# Patient Record
Sex: MALE | Race: WHITE | HISPANIC OR LATINO | Employment: OTHER | URBAN - METROPOLITAN AREA
[De-identification: names, ages, dates, MRNs, and addresses within clinical notes are randomized per-mention and may not be internally consistent; named-entity substitution may affect disease eponyms.]

---

## 2023-03-11 DIAGNOSIS — E03.9 HYPOTHYROIDISM, UNSPECIFIED: ICD-10-CM

## 2023-03-19 RX ORDER — LEVOTHYROXINE SODIUM 112 UG/1
TABLET ORAL
Qty: 102 TABLET | Refills: 2 | Status: SHIPPED | OUTPATIENT
Start: 2023-03-19 | End: 2023-07-13 | Stop reason: SDUPTHER

## 2023-07-04 DIAGNOSIS — I10 ESSENTIAL (PRIMARY) HYPERTENSION: ICD-10-CM

## 2023-07-06 RX ORDER — AMLODIPINE BESYLATE 5 MG/1
TABLET ORAL
Qty: 90 TABLET | Refills: 0 | Status: SHIPPED | OUTPATIENT
Start: 2023-07-06 | End: 2023-07-13 | Stop reason: SDUPTHER

## 2023-07-13 ENCOUNTER — OFFICE VISIT (OUTPATIENT)
Dept: PRIMARY CARE | Facility: CLINIC | Age: 73
End: 2023-07-13
Payer: MEDICARE

## 2023-07-13 VITALS
WEIGHT: 204 LBS | OXYGEN SATURATION: 100 % | HEART RATE: 74 BPM | BODY MASS INDEX: 29.2 KG/M2 | HEIGHT: 70 IN | SYSTOLIC BLOOD PRESSURE: 116 MMHG | DIASTOLIC BLOOD PRESSURE: 72 MMHG

## 2023-07-13 DIAGNOSIS — I10 PRIMARY HYPERTENSION: ICD-10-CM

## 2023-07-13 DIAGNOSIS — E03.9 ACQUIRED HYPOTHYROIDISM: ICD-10-CM

## 2023-07-13 DIAGNOSIS — I10 ESSENTIAL (PRIMARY) HYPERTENSION: ICD-10-CM

## 2023-07-13 DIAGNOSIS — Z00.00 MEDICARE ANNUAL WELLNESS VISIT, SUBSEQUENT: Primary | Chronic | ICD-10-CM

## 2023-07-13 DIAGNOSIS — E03.9 HYPOTHYROIDISM, UNSPECIFIED: ICD-10-CM

## 2023-07-13 DIAGNOSIS — E78.5 HYPERLIPIDEMIA, UNSPECIFIED HYPERLIPIDEMIA TYPE: ICD-10-CM

## 2023-07-13 DIAGNOSIS — Z12.11 SCREENING FOR COLON CANCER: ICD-10-CM

## 2023-07-13 PROBLEM — E78.49 OTHER HYPERLIPIDEMIA: Status: ACTIVE | Noted: 2023-07-13

## 2023-07-13 PROCEDURE — 1125F AMNT PAIN NOTED PAIN PRSNT: CPT | Performed by: INTERNAL MEDICINE

## 2023-07-13 PROCEDURE — G0439 PPPS, SUBSEQ VISIT: HCPCS | Performed by: INTERNAL MEDICINE

## 2023-07-13 PROCEDURE — 1036F TOBACCO NON-USER: CPT | Performed by: INTERNAL MEDICINE

## 2023-07-13 PROCEDURE — 1160F RVW MEDS BY RX/DR IN RCRD: CPT | Performed by: INTERNAL MEDICINE

## 2023-07-13 PROCEDURE — 3074F SYST BP LT 130 MM HG: CPT | Performed by: INTERNAL MEDICINE

## 2023-07-13 PROCEDURE — 3078F DIAST BP <80 MM HG: CPT | Performed by: INTERNAL MEDICINE

## 2023-07-13 PROCEDURE — 1170F FXNL STATUS ASSESSED: CPT | Performed by: INTERNAL MEDICINE

## 2023-07-13 PROCEDURE — 1159F MED LIST DOCD IN RCRD: CPT | Performed by: INTERNAL MEDICINE

## 2023-07-13 RX ORDER — TRIAMCINOLONE ACETONIDE 55 UG/1
SPRAY, METERED NASAL
COMMUNITY

## 2023-07-13 RX ORDER — MAG HYDROX/ALUMINUM HYD/SIMETH 200-200-20
SUSPENSION, ORAL (FINAL DOSE FORM) ORAL AS NEEDED
COMMUNITY

## 2023-07-13 RX ORDER — CETIRIZINE HYDROCHLORIDE 10 MG/1
10 TABLET ORAL AS NEEDED
COMMUNITY
Start: 2015-03-03

## 2023-07-13 RX ORDER — LEVOTHYROXINE SODIUM 112 UG/1
112 TABLET ORAL
Qty: 102 TABLET | Refills: 2 | Status: SHIPPED | OUTPATIENT
Start: 2023-07-13 | End: 2024-01-19

## 2023-07-13 RX ORDER — AMLODIPINE BESYLATE 5 MG/1
5 TABLET ORAL DAILY
Qty: 90 TABLET | Refills: 2 | Status: SHIPPED | OUTPATIENT
Start: 2023-07-13 | End: 2023-10-17 | Stop reason: SDUPTHER

## 2023-07-13 RX ORDER — LOVASTATIN 40 MG/1
40 TABLET ORAL NIGHTLY
Qty: 90 TABLET | Refills: 2 | Status: SHIPPED | OUTPATIENT
Start: 2023-07-13 | End: 2024-04-04

## 2023-07-13 RX ORDER — LOVASTATIN 40 MG/1
40 TABLET ORAL NIGHTLY
COMMUNITY
End: 2023-07-13 | Stop reason: SDUPTHER

## 2023-07-13 RX ORDER — ASPIRIN 81 MG/1
1 TABLET ORAL DAILY
COMMUNITY
Start: 2021-10-18

## 2023-07-13 ASSESSMENT — ENCOUNTER SYMPTOMS
BLURRED VISION: 0
ORTHOPNEA: 0
HYPERTENSION: 1
NECK PAIN: 0
HEADACHES: 0
PALPITATIONS: 0

## 2023-07-13 ASSESSMENT — PATIENT HEALTH QUESTIONNAIRE - PHQ9
SUM OF ALL RESPONSES TO PHQ9 QUESTIONS 1 AND 2: 0
2. FEELING DOWN, DEPRESSED OR HOPELESS: NOT AT ALL
1. LITTLE INTEREST OR PLEASURE IN DOING THINGS: NOT AT ALL

## 2023-07-13 ASSESSMENT — ACTIVITIES OF DAILY LIVING (ADL)
BATHING: INDEPENDENT
GROCERY_SHOPPING: INDEPENDENT
DOING_HOUSEWORK: INDEPENDENT
DRESSING: INDEPENDENT
MANAGING_FINANCES: INDEPENDENT
TAKING_MEDICATION: INDEPENDENT

## 2023-07-13 NOTE — PROGRESS NOTES
Subjective   Reason for Visit: Gaurav Fischer is an 73 y.o. male here for a Medicare Wellness visit.     Past Medical, Surgical, and Family History reviewed and updated in chart.    Reviewed all medications by prescribing practitioner or clinical pharmacist (such as prescriptions, OTCs, herbal therapies and supplements) and documented in the medical record.    Rash    Hypertension  This is a chronic problem. The current episode started more than 1 year ago. The problem has been resolved since onset. The problem is controlled. Pertinent negatives include no anxiety, blurred vision, chest pain, headaches, neck pain, orthopnea or palpitations. There are no associated agents to hypertension. Risk factors for coronary artery disease include male gender. Past treatments include calcium channel blockers. The current treatment provides significant improvement. There are no compliance problems.  There is no history of angina, kidney disease, CAD/MI, CVA, heart failure, left ventricular hypertrophy, PVD or retinopathy. There is no history of chronic renal disease or sleep apnea.       Patient Self Assessment of Health Status  Patient Self Assessment: Fair    Nutrition and Exercise  Current Diet: Unhealthy Diet  Adequate Fluid Intake: Yes  Caffeine: No  Exercise Frequency: No Exercise    Functional Ability/Level of Safety  Cognitive Impairment Observed: No cognitive impairment observed  Cognitive Impairment Reported: No cognitive impairment reported by patient or family    Home Safety Risk Factors: None    Patient Care Team:  Renetta Barnett MD as PCP - General  Renetta Barnett MD as PCP - [employer] PCP     Review of Systems   Eyes:  Negative for blurred vision.   Cardiovascular:  Negative for chest pain, palpitations and orthopnea.   Musculoskeletal:  Negative for neck pain.   Skin:  Positive for rash.   Neurological:  Negative for headaches.       Objective   Vitals:  /72   Pulse 74   Ht 1.765  "m (5' 9.5\")   Wt 92.5 kg (204 lb)   SpO2 100%   BMI 29.69 kg/m²       Physical Exam    Lab Results   Component Value Date    WBC 5.5 07/13/2020    HGB 13.9 07/13/2020    HCT 42.6 07/13/2020     07/13/2020    CHOL 122 02/06/2023    TRIG 64 02/06/2023    HDL 49.1 02/06/2023    LDLDIRECT 66 02/06/2023    ALT 20 06/14/2022    AST 30 06/14/2022     02/06/2023    K 4.2 02/06/2023     02/06/2023    CREATININE 1.08 02/06/2023    BUN 20 02/06/2023    CO2 31 02/06/2023    TSH 2.42 10/13/2022    GLUF 96 05/19/2022    HGBA1C 5.6 05/19/2022       Assessment/Plan   Problem List Items Addressed This Visit       Primary hypertension    Relevant Medications    amLODIPine (Norvasc) 5 mg tablet    Acquired hypothyroidism    Relevant Medications    levothyroxine (Synthroid, Levoxyl) 112 mcg tablet    Other Relevant Orders    Tsh With Reflex To Free T4 If Abnormal     Other Visit Diagnoses       Medicare annual wellness visit, subsequent  (Chronic)   -  Primary    Relevant Orders    Comprehensive metabolic panel    Lipid panel    Prostate Spec.Ag,Screen    Essential (primary) hypertension        Relevant Medications    amLODIPine (Norvasc) 5 mg tablet    Hypothyroidism, unspecified        Relevant Medications    levothyroxine (Synthroid, Levoxyl) 112 mcg tablet    Other Relevant Orders    Tsh With Reflex To Free T4 If Abnormal    Hyperlipidemia, unspecified hyperlipidemia type        Relevant Medications    lovastatin (Mevacor) 40 mg tablet            A/P        CMP,LIPID,PSA, COLOGUARD   LEVOTHYROXIN 112MCG 1 PILL EVERY DAY AND 1.5 PILLS ONLY ON MONDAY FILLED  AMLODIPINE 5MG 1 PILL EVERY DAY FILLED  LOVASTATIN 40MG 1 PILL EVERY DAY FILLED  HE WILL LET ME KNOW WHEN HE HAD LAST COLONOSCOPY            Advance Care Planning Note     Discussion Date: 07/13/23   Discussion Participants: patient    The patient wishes to discuss Advance Care Planning today and the following is a brief summary of our discussion. "     Patient has capacity to make their own medical decisions: Yes  Health Care Agent/Surrogate Decision Maker documented in chart: No    Documents on file and valid:  Advance Directive/Living Will: No   Health Care Power of : No  Other: NA    Communication of Medical Status/Prognosis:   NA     Communication of Treatment Goals/Options:       DISCUSSED WITH THE PT END OF LIFE CHOICES , PT IS PRESENTLY FULL CODE , HE WILL GET POA FOR HEALTH CARE /& LIVING WILL AND WILL BRING IT BACK ON NEXT OFFICE VISIT SO THIS CAN BE SCANNED INTO THE CHART FOR THE PURPOSE OF DOCUMENTATION      Treatment Decisions  DISCUSSED    Time Statement: Total face to face time spent on advance care planning was 5 minutes with 5 minutes spent in counseling, including the explanation.    Renetta Barnett MD  7/13/2023 2:17 PM

## 2023-07-14 ENCOUNTER — TELEPHONE (OUTPATIENT)
Dept: PRIMARY CARE | Facility: CLINIC | Age: 73
End: 2023-07-14

## 2023-07-14 LAB
ALANINE AMINOTRANSFERASE (SGPT) (U/L) IN SER/PLAS: 19 U/L (ref 10–52)
ALBUMIN (G/DL) IN SER/PLAS: 4.9 G/DL (ref 3.4–5)
ALKALINE PHOSPHATASE (U/L) IN SER/PLAS: 61 U/L (ref 33–136)
ANION GAP IN SER/PLAS: 14 MMOL/L (ref 10–20)
ASPARTATE AMINOTRANSFERASE (SGOT) (U/L) IN SER/PLAS: 24 U/L (ref 9–39)
BILIRUBIN TOTAL (MG/DL) IN SER/PLAS: 0.6 MG/DL (ref 0–1.2)
CALCIUM (MG/DL) IN SER/PLAS: 9.5 MG/DL (ref 8.6–10.6)
CARBON DIOXIDE, TOTAL (MMOL/L) IN SER/PLAS: 28 MMOL/L (ref 21–32)
CHLORIDE (MMOL/L) IN SER/PLAS: 104 MMOL/L (ref 98–107)
CHOLESTEROL (MG/DL) IN SER/PLAS: 132 MG/DL (ref 0–199)
CHOLESTEROL IN HDL (MG/DL) IN SER/PLAS: 48.5 MG/DL
CHOLESTEROL/HDL RATIO: 2.7
CREATININE (MG/DL) IN SER/PLAS: 1.11 MG/DL (ref 0.5–1.3)
GFR MALE: 71 ML/MIN/1.73M2
GLUCOSE (MG/DL) IN SER/PLAS: 83 MG/DL (ref 74–99)
LDL: 69 MG/DL (ref 0–99)
POTASSIUM (MMOL/L) IN SER/PLAS: 4.9 MMOL/L (ref 3.5–5.3)
PROSTATE SPECIFIC ANTIGEN,SCREEN: 1.54 NG/ML (ref 0–4)
PROTEIN TOTAL: 7.1 G/DL (ref 6.4–8.2)
SODIUM (MMOL/L) IN SER/PLAS: 141 MMOL/L (ref 136–145)
THYROTROPIN (MIU/L) IN SER/PLAS BY DETECTION LIMIT <= 0.05 MIU/L: 0.83 MIU/L (ref 0.44–3.98)
TRIGLYCERIDE (MG/DL) IN SER/PLAS: 71 MG/DL (ref 0–149)
UREA NITROGEN (MG/DL) IN SER/PLAS: 18 MG/DL (ref 6–23)
VLDL: 14 MG/DL (ref 0–40)

## 2023-07-14 PROCEDURE — 80053 COMPREHEN METABOLIC PANEL: CPT

## 2023-07-14 PROCEDURE — 80061 LIPID PANEL: CPT

## 2023-07-14 PROCEDURE — 84443 ASSAY THYROID STIM HORMONE: CPT

## 2023-07-14 PROCEDURE — 84153 ASSAY OF PSA TOTAL: CPT

## 2023-07-14 NOTE — TELEPHONE ENCOUNTER
Pt. States dropped off colonoscopy report. Asking if he still needs to do the Cologuard test. Report place on your desk.

## 2023-08-02 LAB — NONINV COLON CA DNA+OCC BLD SCRN STL QL: NEGATIVE

## 2023-08-23 PROBLEM — R00.2 PALPITATIONS: Status: ACTIVE | Noted: 2023-08-23

## 2023-08-23 PROBLEM — I73.9 CLAUDICATION, INTERMITTENT (CMS-HCC): Status: ACTIVE | Noted: 2023-08-23

## 2023-08-23 PROBLEM — H57.13 EYE PAIN, BILATERAL: Status: ACTIVE | Noted: 2023-08-23

## 2023-08-23 PROBLEM — C73 MEDULLARY THYROID CARCINOMA (MULTI): Status: ACTIVE | Noted: 2023-08-23

## 2023-08-23 PROBLEM — R91.1 LUNG NODULE: Status: ACTIVE | Noted: 2023-08-23

## 2023-08-23 PROBLEM — E66.3 OVERWEIGHT (BMI 25.0-29.9): Status: ACTIVE | Noted: 2023-08-23

## 2023-08-23 PROBLEM — H93.11 TINNITUS OF RIGHT EAR: Status: ACTIVE | Noted: 2023-08-23

## 2023-08-23 PROBLEM — K21.9 ESOPHAGEAL REFLUX: Status: ACTIVE | Noted: 2023-08-23

## 2023-08-23 PROBLEM — R35.1 NOCTURIA: Status: ACTIVE | Noted: 2023-08-23

## 2023-08-23 PROBLEM — G89.29 CHRONIC LOW BACK PAIN: Status: ACTIVE | Noted: 2023-08-23

## 2023-08-23 PROBLEM — R20.9 SENSATION OF COLD IN FINGER: Status: ACTIVE | Noted: 2023-08-23

## 2023-08-23 PROBLEM — Z98.890 H/O TOTAL THYROIDECTOMY: Status: ACTIVE | Noted: 2023-08-23

## 2023-08-23 PROBLEM — R35.0 FREQUENCY OF URINATION: Status: ACTIVE | Noted: 2023-08-23

## 2023-08-23 PROBLEM — N52.9 ERECTILE DYSFUNCTION: Status: ACTIVE | Noted: 2023-08-23

## 2023-08-23 PROBLEM — R55 FAINTING SPELL: Status: ACTIVE | Noted: 2023-08-23

## 2023-08-23 PROBLEM — M70.61 TROCHANTERIC BURSITIS OF RIGHT HIP: Status: ACTIVE | Noted: 2023-08-23

## 2023-08-23 PROBLEM — N13.8 BPH WITH OBSTRUCTION/LOWER URINARY TRACT SYMPTOMS: Status: ACTIVE | Noted: 2023-08-23

## 2023-08-23 PROBLEM — N28.1 KIDNEY CYSTS: Status: ACTIVE | Noted: 2023-08-23

## 2023-08-23 PROBLEM — E89.0 H/O TOTAL THYROIDECTOMY: Status: ACTIVE | Noted: 2023-08-23

## 2023-08-23 PROBLEM — K43.2 INCISIONAL HERNIA, WITHOUT OBSTRUCTION OR GANGRENE: Status: ACTIVE | Noted: 2023-08-23

## 2023-08-23 PROBLEM — Z98.890 HISTORY OF ENDOSCOPIC SINUS SURGERY: Status: ACTIVE | Noted: 2018-07-11

## 2023-08-23 PROBLEM — R42 INTERMITTENT LIGHTHEADEDNESS: Status: ACTIVE | Noted: 2023-08-23

## 2023-08-23 PROBLEM — E88.01 ALPHA-1-ANTITRYPSIN DEFICIENCY (MULTI): Status: ACTIVE | Noted: 2019-10-03

## 2023-08-23 PROBLEM — R30.0 DYSURIA: Status: ACTIVE | Noted: 2023-08-23

## 2023-08-23 PROBLEM — Z90.89 H/O TOTAL THYROIDECTOMY: Status: ACTIVE | Noted: 2023-08-23

## 2023-08-23 PROBLEM — M25.551 RIGHT HIP PAIN: Status: ACTIVE | Noted: 2023-08-23

## 2023-08-23 PROBLEM — D53.9 ANEMIA, DEFICIENCY: Status: ACTIVE | Noted: 2023-08-23

## 2023-08-23 PROBLEM — M54.50 CHRONIC LOW BACK PAIN: Status: ACTIVE | Noted: 2023-08-23

## 2023-08-23 PROBLEM — M53.3 PAIN IN THE COCCYX: Status: ACTIVE | Noted: 2023-08-23

## 2023-08-23 PROBLEM — K80.20 GALLSTONES: Status: ACTIVE | Noted: 2023-08-23

## 2023-08-23 PROBLEM — N40.1 BPH WITH OBSTRUCTION/LOWER URINARY TRACT SYMPTOMS: Status: ACTIVE | Noted: 2023-08-23

## 2023-08-23 PROBLEM — R06.09 DYSPNEA ON EXERTION: Status: ACTIVE | Noted: 2023-08-23

## 2023-08-23 PROBLEM — R35.81 NOCTURNAL POLYURIA: Status: ACTIVE | Noted: 2023-08-23

## 2023-08-23 RX ORDER — FAMOTIDINE 20 MG/1
20 TABLET, FILM COATED ORAL DAILY
COMMUNITY

## 2023-08-23 RX ORDER — OMEPRAZOLE 20 MG/1
20 TABLET, DELAYED RELEASE ORAL DAILY
COMMUNITY
Start: 2008-07-22 | End: 2023-10-30 | Stop reason: ALTCHOICE

## 2023-08-23 RX ORDER — TAMSULOSIN HYDROCHLORIDE 0.4 MG/1
0.4 CAPSULE ORAL DAILY
COMMUNITY

## 2023-08-23 RX ORDER — AZELASTINE 1 MG/ML
SPRAY, METERED NASAL
COMMUNITY
End: 2023-10-30 | Stop reason: ALTCHOICE

## 2023-08-23 RX ORDER — TADALAFIL 5 MG/1
5 TABLET ORAL DAILY
COMMUNITY

## 2023-10-17 DIAGNOSIS — I10 ESSENTIAL (PRIMARY) HYPERTENSION: ICD-10-CM

## 2023-10-17 DIAGNOSIS — N39.0 URINARY TRACT INFECTION WITHOUT HEMATURIA, SITE UNSPECIFIED: Primary | ICD-10-CM

## 2023-10-17 DIAGNOSIS — I10 PRIMARY HYPERTENSION: ICD-10-CM

## 2023-10-17 RX ORDER — AMLODIPINE BESYLATE 5 MG/1
5 TABLET ORAL DAILY
Qty: 90 TABLET | Refills: 2 | Status: SHIPPED | OUTPATIENT
Start: 2023-10-17

## 2023-10-18 ENCOUNTER — CLINICAL SUPPORT (OUTPATIENT)
Dept: PRIMARY CARE | Facility: CLINIC | Age: 73
End: 2023-10-18
Payer: MEDICARE

## 2023-10-18 DIAGNOSIS — N39.0 URINARY TRACT INFECTION WITHOUT HEMATURIA, SITE UNSPECIFIED: ICD-10-CM

## 2023-10-18 LAB
APPEARANCE UR: CLEAR
BILIRUB UR STRIP.AUTO-MCNC: NEGATIVE MG/DL
COLOR UR: YELLOW
GLUCOSE UR STRIP.AUTO-MCNC: NEGATIVE MG/DL
KETONES UR STRIP.AUTO-MCNC: NEGATIVE MG/DL
LEUKOCYTE ESTERASE UR QL STRIP.AUTO: NEGATIVE
NITRITE UR QL STRIP.AUTO: NEGATIVE
PH UR STRIP.AUTO: 6 [PH]
PROT UR STRIP.AUTO-MCNC: NEGATIVE MG/DL
RBC # UR STRIP.AUTO: NEGATIVE /UL
SP GR UR STRIP.AUTO: 1.01
UROBILINOGEN UR STRIP.AUTO-MCNC: <2 MG/DL

## 2023-10-18 PROCEDURE — 87086 URINE CULTURE/COLONY COUNT: CPT

## 2023-10-18 PROCEDURE — 81003 URINALYSIS AUTO W/O SCOPE: CPT

## 2023-10-19 LAB — BACTERIA UR CULT: NO GROWTH

## 2023-10-20 ENCOUNTER — TELEPHONE (OUTPATIENT)
Dept: PRIMARY CARE | Facility: CLINIC | Age: 73
End: 2023-10-20
Payer: MEDICARE

## 2023-10-30 ENCOUNTER — OFFICE VISIT (OUTPATIENT)
Dept: CARDIOLOGY | Facility: CLINIC | Age: 73
End: 2023-10-30
Payer: MEDICARE

## 2023-10-30 VITALS
OXYGEN SATURATION: 98 % | HEIGHT: 70 IN | BODY MASS INDEX: 29.49 KG/M2 | WEIGHT: 206 LBS | SYSTOLIC BLOOD PRESSURE: 121 MMHG | DIASTOLIC BLOOD PRESSURE: 76 MMHG | HEART RATE: 61 BPM

## 2023-10-30 DIAGNOSIS — R93.1 ELEVATED CORONARY ARTERY CALCIUM SCORE: ICD-10-CM

## 2023-10-30 DIAGNOSIS — I10 PRIMARY HYPERTENSION: ICD-10-CM

## 2023-10-30 DIAGNOSIS — R06.09 DYSPNEA ON EXERTION: ICD-10-CM

## 2023-10-30 DIAGNOSIS — R00.2 PALPITATIONS: Primary | ICD-10-CM

## 2023-10-30 PROCEDURE — 99215 OFFICE O/P EST HI 40 MIN: CPT | Performed by: INTERNAL MEDICINE

## 2023-10-30 PROCEDURE — 1159F MED LIST DOCD IN RCRD: CPT | Performed by: INTERNAL MEDICINE

## 2023-10-30 PROCEDURE — 99215 OFFICE O/P EST HI 40 MIN: CPT | Mod: 25 | Performed by: INTERNAL MEDICINE

## 2023-10-30 PROCEDURE — 1036F TOBACCO NON-USER: CPT | Performed by: INTERNAL MEDICINE

## 2023-10-30 PROCEDURE — 3078F DIAST BP <80 MM HG: CPT | Performed by: INTERNAL MEDICINE

## 2023-10-30 PROCEDURE — 93005 ELECTROCARDIOGRAM TRACING: CPT | Performed by: INTERNAL MEDICINE

## 2023-10-30 PROCEDURE — 3074F SYST BP LT 130 MM HG: CPT | Performed by: INTERNAL MEDICINE

## 2023-10-30 PROCEDURE — 1160F RVW MEDS BY RX/DR IN RCRD: CPT | Performed by: INTERNAL MEDICINE

## 2023-10-30 PROCEDURE — 93010 ELECTROCARDIOGRAM REPORT: CPT | Performed by: INTERNAL MEDICINE

## 2023-10-30 PROCEDURE — 1126F AMNT PAIN NOTED NONE PRSNT: CPT | Performed by: INTERNAL MEDICINE

## 2023-10-30 ASSESSMENT — ENCOUNTER SYMPTOMS
BLOATING: 0
WHEEZING: 0
LIGHT-HEADEDNESS: 0
DECREASED LIBIDO: 0
PALPITATIONS: 0
NEAR-SYNCOPE: 0
DYSPNEA ON EXERTION: 0
HEADACHES: 0
ORTHOPNEA: 0
CLAUDICATION: 0
LOSS OF BALANCE: 0
BOWEL INCONTINENCE: 0
DIAPHORESIS: 0
CHILLS: 0
SPUTUM PRODUCTION: 0
DIZZINESS: 0
FEVER: 0
PND: 0
ENDOCRINE NEGATIVE: 1
BLURRED VISION: 0
EYE PAIN: 0
HEMOPTYSIS: 0
IRREGULAR HEARTBEAT: 1
DOUBLE VISION: 0
PSYCHIATRIC NEGATIVE: 1
SHORTNESS OF BREATH: 0
CHANGE IN BOWEL HABIT: 0
ABDOMINAL PAIN: 0
FALLS: 0
ANOREXIA: 0
EYE DISCHARGE: 0
BACK PAIN: 0
DECREASED APPETITE: 0
JOINT SWELLING: 0
COUGH: 0

## 2023-10-30 ASSESSMENT — PAIN SCALES - GENERAL: PAINLEVEL: 0-NO PAIN

## 2023-10-30 NOTE — PATIENT INSTRUCTIONS
"It was a pleasure seeing you today. I would like to see you back in clinic in  4  months. You can call my office if questions arise between now and our next visit.     Today, we talked about your overall health   -- I am glad to hear that you went hiking in the mountains and had no difficulty    -- Please resume dailyt aspirin 81 mg     -- Please continue your current medications     -- Let us know if you feel more heart palpitations, if they are longer in duration as we will need to look at an event monitor .     Below are some Heart Health Tips that we provide to all of our patients. I hope you fing them useful.     - If you are having problems with medications, consider looking at the following websites.   --  \"[facility]\"   --  \"[facility]\"      - We are happy to supply written prescriptions if needed to allow you to obtain your medications from different pharmacies. Additionally, if you are having issues with mail order delivery, please let us know. We can send a limited supply of your medications to your local pharmacy.     -  We recommend you follow a heart healthy diet. Watch food labels and try not to eat more than 2,500 mg of sodium per day. Avoid foods high in salt like processed meats (lunch meats, mullins, and sausage), processed foods (boxed dinners, canned soups), fried and fast foods. Monitor serving sizes and if the sodium per serving size is more than 200 mg, avoid those foods. If the sodium per serving size is between 100-200 mg, you can use those in limited quantities. Try to choose foods where the amount of sodium per serving size is less than 100 mg. Try to eat a diet rich in fruits and vegetables, whole grains, low fat dairy products, skinless poultry and fish, nuts, beans, non-tropical vegetable oils. Limit saturated fat, trans fat, sodium, red meats, and sugar-sweetened beverages.   Limit alcohol     -The combination of a reduced-calorie diet and increased physical activity " is recommended. Adults should aim to get at least 150 minutes of moderate physical activity per week (30 minutes of moderate physical activities at least 5 days per week). Examples of moderate physical activities include brisk walking, swimming, aerobic dancing, heavy gardening, jumping rope, bicycling 10 MPH or faster, tennis, hiking uphill or with a heavy backpack. Please let us know if you would like to learn more about your nutrition and calories and additional options including weight loss programs to help you reach your goal.     -If you smoke, stop smoking. If you stop smoking you can help get rid of a major source of stress to your heart. Smoking makes your heart rate and blood pressure go up and increases your risk or developing cardiovascular diseases and worsen symptoms associated with heart failure.     -Obtain a BP monitor and monitor your BP daily. Check it around the same time each day; at least 1 hour after taking your medications. Record your BP in a log and bring your log with you to your doctors appointment.     -F/u with your PCP as recommended.

## 2023-10-30 NOTE — PROGRESS NOTES
"Chief Complaint:   Follow-up     History Of Present Illness:    Gaurav Fischer is a 73 y.o. male presenting with a pertinent medical history notable for essential hypertension, BPH, alpha 1 antitrypsin, dyslipidemia, dyspnea on exertion, gastroesophageal reflux disease, hypersomnia with obstructive sleep apnea, history of medullary thyroid carcinoma who presents to cardiology for follow up of of heart palpitations and presyncope and dyslipidemia with elevated CAC     His history is rather convoluted. Recall   --States that he will have intermittent periods of sensations of a \"pounding heart\" that usually occur in the evening. He notes that these usually occur after day of heavy exertion, such as shoveling snow, moving 50 pound bags of lawn care materials, or watching baseball games in the sun. He reports that he has no chest discomfort, palpitations, heaviness, dyspnea exertion during these episodes, usually occurs right when he is about to go to bed later that night.   He also reports intermittent periods of presyncope, lightheadedness, dizziness that occur while he is in [employer]. States that his symptoms will somewhat chanel when he is able to sit back down. Further, he is very concerned about \"circulation problems\" in his hands and arms. He reports intermittent numbness, tingling as well as change in temperature of his hands. Additionally, he is concerned about \"things building up in my heart\" and he has been doing \"a lot of research about coronary calcium and wonders whether he should have this done\"      Since he was last seen, he reports that many of his symptoms have improved following reduction his his thyroid replacement.Otherwise, no issues. He feels dramatically improved. He has been following his cholesterol levels, and is shocked at how well he is responding to his current medication. He reports no myalgias, arthralgias.     Today ( 2/6/2023) he returns for scheduled follow-up. He reports that his " "dizziness has improved, feels that this was related to his levothyroxine dose. He notes that he has not had any issues with heart palpitations. He has been shooting pool to stay active., goes square dancing every other weekend. States that he had been out of breath several years ago, but no longer. He is able to keep up and enjoys it.     10/360/2023 -- He returns for follow up. He had been doing quiet well until about 1 week prior. He had an episode of heart palpitations where he heart was \"Pounding\" and \"Slow\"  This occurred about the time of COVID vaccine. He notes that he had been in [place] [employer] and walked ~ 7 miles daily. He was able to climb 1000' ascent ( 5500 --> 6500 feet of elevation) without chest pressure or pain, shortness of breath. He had attended a Community Health screening ( [facility]) where his cholesterol panel was obtained showing very acceptable levels with TC < 120. Its is surprising that it wasn't uploaded to his chart.  He is otherwise doing well.       Review of Systems   Constitutional: Negative for chills, decreased appetite, diaphoresis and fever.   HENT:  Negative for congestion, ear discharge, ear pain and hearing loss.    Eyes:  Negative for blurred vision, discharge, double vision and pain.   Cardiovascular:  Positive for irregular heartbeat. Negative for chest pain, claudication, cyanosis, dyspnea on exertion, leg swelling, near-syncope, orthopnea, palpitations and paroxysmal nocturnal dyspnea.   Respiratory:  Negative for cough, hemoptysis, shortness of breath, sputum production and wheezing.    Endocrine: Negative.    Skin: Negative.    Musculoskeletal:  Negative for arthritis, back pain, falls, joint pain, joint swelling and muscle weakness.   Gastrointestinal:  Negative for bloating, abdominal pain, anorexia, change in bowel habit and bowel incontinence.   Genitourinary:  Negative for bladder incontinence and decreased libido.   Neurological:  Negative for dizziness, " "headaches, light-headedness and loss of balance.   Psychiatric/Behavioral: Negative.           Last Recorded Vitals:  Vitals:    10/30/23 0943   BP: 121/76   Patient Position: Sitting   Pulse: 61   SpO2: 98%   Weight: 93.4 kg (206 lb)   Height: 1.778 m (5' 10\")       Past Medical History:  He has a past medical history of Dependence on other enabling machines and devices, Encounter for general adult medical examination without abnormal findings (07/12/2019), and Personal history of other endocrine, nutritional and metabolic disease (03/03/2015).    Past Surgical History:  He has a past surgical history that includes Colonoscopy (03/03/2015); Appendectomy (03/03/2015); Tonsillectomy (03/03/2015); Sinus surgery (03/03/2015); Hernia repair (03/03/2015); Other surgical history (02/17/2017); Other surgical history (04/08/2021); and Total thyroidectomy (01/05/2022).      Social History:  He reports that he has never smoked. He has never used smokeless tobacco. He reports current alcohol use of about 3.0 standard drinks of alcohol per week. He reports that he does not use drugs.    Family History:  Family History   Problem Relation Name Age of Onset    Breast cancer Mother      Other (mesothelioma) Father      Breast cancer Sister      Diabetes type II Sister          Allergies:  Sulfamethoxazole-trimethoprim, Griseofulvin, and Pollen extracts    Outpatient Medications:  Current Outpatient Medications   Medication Instructions    amLODIPine (NORVASC) 5 mg, oral, Daily    aspirin 81 mg EC tablet 1 tablet, oral, Daily    cetirizine (ZYRTEC) 10 mg, oral, As needed    famotidine (PEPCID) 20 mg, oral, Daily    hydrocortisone 1 % ointment Topical, As needed    levothyroxine (SYNTHROID, LEVOXYL) 112 mcg, oral, Daily before breakfast    lovastatin (MEVACOR) 40 mg, oral, Nightly    MELATONIN ORAL 1 mg, oral, Take as directed    neomycin-polymyxin-pramoxine (Neosporin) 3.5-10,000-10 mg-unit-mg/gram cream Topical    tadalafil " "(CIALIS) 5 mg, oral, Daily    tamsulosin (FLOMAX) 0.4 mg, oral, Daily    triamcinolone (Nasacort) 55 mcg nasal inhaler nasal       Physical Exam:  /76 (Patient Position: Sitting)   Pulse 61   Ht 1.778 m (5' 10\")   Wt 93.4 kg (206 lb)   SpO2 98%   BMI 29.56 kg/m²     General Appearance:  Alert, cooperative, no distress, appears stated age   Head:  Normocephalic, without obvious abnormality, atraumatic   Eyes:  PERRL, cboth eyes   Ears:  Normal ears   Nose: Nares normal, septum midline, mucosa normal, no drainage or sinus tenderness   Throat: Lips, mucosa, and tongue normal; teeth and gums normal   Neck: Supple,  no carotid bruit or JVD   Back:   Symmetric, no curvature, ROM normal, no CVA tenderness   Lungs:   Clear to auscultation bilaterally, respirations unlabored   Chest Wall:  No tenderness or deformity   Heart:  Regular rate and rhythm, S1, S2 normal, no murmur, rub or gallop   Abdomen:   Soft, non-tender, bowel sounds active all four quadrants,  no masses, no organomegaly   Extremities: Extremities normal, atraumatic, no cyanosis or edema   Pulses: 2+ and symmetric   Skin: Skin color, texture, turgor normal, no rashes or lesions   Lymph nodes: Cervical, supraclavicular, and axillary nodes normal   Neurologic: Normal          Last Labs:  CBC -  Lab Results   Component Value Date    WBC 5.5 07/13/2020    HGB 13.9 07/13/2020    HCT 42.6 07/13/2020    MCV 88 07/13/2020     07/13/2020       CMP -  Lab Results   Component Value Date    CALCIUM 9.5 07/14/2023    PHOS 3.4 02/06/2023    PROT 7.1 07/14/2023    ALBUMIN 4.9 07/14/2023    AST 24 07/14/2023    ALT 19 07/14/2023    ALKPHOS 61 07/14/2023    BILITOT 0.6 07/14/2023       LIPID PANEL -   Lab Results   Component Value Date    CHOL 132 07/14/2023    TRIG 71 07/14/2023    HDL 48.5 07/14/2023    CHHDL 2.7 07/14/2023    LDLF 69 07/14/2023    VLDL 14 07/14/2023       RENAL FUNCTION PANEL -   Lab Results   Component Value Date    GLUCOSE 83 07/14/2023 "     07/14/2023    K 4.9 07/14/2023     07/14/2023    CO2 28 07/14/2023    ANIONGAP 14 07/14/2023    BUN 18 07/14/2023    CREATININE 1.11 07/14/2023    GFRMALE 71 07/14/2023    CALCIUM 9.5 07/14/2023    PHOS 3.4 02/06/2023    ALBUMIN 4.9 07/14/2023        Lab Results   Component Value Date    HGBA1C 5.6 05/19/2022       Last Cardiology Tests:  ECG:  In Office EKG       Echo:  05/2021   1. The left ventricular systolic function is normal with a 60-65% estimated ejection fraction.   2. Borderline increased LV mass.   3. Moderately increased left ventricular septal thickness.   4. RVSP within normal limits.      Cardiac Imaging:  CT HEART CALCIUM SCORING WO IV CONTRAST 2/13/2023  FINDINGS:  The score and distribution of calcium in the coronary arteries is as  follows:     .49,  .35,  LCx 0,  RCA 0,     Total   829.84    Lab review: I have personally reviewed the laboratory result(s)   Diagnostic review: I have personally reviewed the result(s) of the EKG and Echocardiogram .   Imaging review: I have  personally reviewed the result(s) CT Coronary Artery Calcium Scoring     Assessment/Plan   Problem List Items Addressed This Visit             ICD-10-CM    Primary hypertension I10    Relevant Orders    ECG 12 lead (Clinic Performed)    Dyspnea on exertion R06.09    Palpitations - Primary R00.2    Relevant Orders    ECG 12 lead (Clinic Performed)    Elevated coronary artery calcium score R93.1    Relevant Orders    ECG 12 lead (Clinic Performed)     Continues to do well from a cardiac standpoint and offers no symptoms   -- Continue current medications at this time  -- Discussed stress testing, event monitor for heart palpitations that he experienced.  He wishes to refrain at this time but will let us know if these return or increase.    Quincy Benavides, DO

## 2023-11-01 LAB
ATRIAL RATE: 61 BPM
P AXIS: 43 DEGREES
P OFFSET: 191 MS
P ONSET: 126 MS
PR INTERVAL: 196 MS
Q ONSET: 224 MS
QRS COUNT: 10 BEATS
QRS DURATION: 94 MS
QT INTERVAL: 400 MS
QTC CALCULATION(BAZETT): 402 MS
QTC FREDERICIA: 402 MS
R AXIS: 50 DEGREES
T AXIS: 67 DEGREES
T OFFSET: 424 MS
VENTRICULAR RATE: 61 BPM

## 2024-01-11 ENCOUNTER — ANCILLARY PROCEDURE (OUTPATIENT)
Dept: RADIOLOGY | Facility: CLINIC | Age: 74
End: 2024-01-11
Payer: MEDICARE

## 2024-01-11 DIAGNOSIS — M25.551 RIGHT HIP PAIN: ICD-10-CM

## 2024-01-11 PROCEDURE — 73502 X-RAY EXAM HIP UNI 2-3 VIEWS: CPT | Mod: RIGHT SIDE | Performed by: RADIOLOGY

## 2024-01-11 PROCEDURE — 73502 X-RAY EXAM HIP UNI 2-3 VIEWS: CPT | Mod: RT

## 2024-01-13 ENCOUNTER — APPOINTMENT (OUTPATIENT)
Dept: RADIOLOGY | Facility: HOSPITAL | Age: 74
End: 2024-01-13
Payer: MEDICARE

## 2024-01-13 ENCOUNTER — HOSPITAL ENCOUNTER (EMERGENCY)
Facility: HOSPITAL | Age: 74
Discharge: HOME | End: 2024-01-13
Attending: EMERGENCY MEDICINE
Payer: MEDICARE

## 2024-01-13 VITALS
TEMPERATURE: 97.3 F | WEIGHT: 209 LBS | HEIGHT: 70 IN | RESPIRATION RATE: 18 BRPM | HEART RATE: 77 BPM | BODY MASS INDEX: 29.92 KG/M2 | OXYGEN SATURATION: 98 % | SYSTOLIC BLOOD PRESSURE: 155 MMHG | DIASTOLIC BLOOD PRESSURE: 85 MMHG

## 2024-01-13 DIAGNOSIS — M25.551 PAIN OF RIGHT HIP: Primary | ICD-10-CM

## 2024-01-13 PROCEDURE — 99283 EMERGENCY DEPT VISIT LOW MDM: CPT | Performed by: EMERGENCY MEDICINE

## 2024-01-13 RX ORDER — TRAMADOL HYDROCHLORIDE 50 MG/1
50 TABLET ORAL EVERY 6 HOURS PRN
Qty: 10 TABLET | Refills: 0 | Status: SHIPPED | OUTPATIENT
Start: 2024-01-13 | End: 2024-01-19

## 2024-01-13 ASSESSMENT — PAIN DESCRIPTION - LOCATION: LOCATION: BACK

## 2024-01-13 ASSESSMENT — LIFESTYLE VARIABLES
HAVE YOU EVER FELT YOU SHOULD CUT DOWN ON YOUR DRINKING: NO
EVER FELT BAD OR GUILTY ABOUT YOUR DRINKING: NO
HAVE PEOPLE ANNOYED YOU BY CRITICIZING YOUR DRINKING: NO
REASON UNABLE TO ASSESS: NO
EVER HAD A DRINK FIRST THING IN THE MORNING TO STEADY YOUR NERVES TO GET RID OF A HANGOVER: NO

## 2024-01-13 ASSESSMENT — COLUMBIA-SUICIDE SEVERITY RATING SCALE - C-SSRS
2. HAVE YOU ACTUALLY HAD ANY THOUGHTS OF KILLING YOURSELF?: NO
1. IN THE PAST MONTH, HAVE YOU WISHED YOU WERE DEAD OR WISHED YOU COULD GO TO SLEEP AND NOT WAKE UP?: NO
6. HAVE YOU EVER DONE ANYTHING, STARTED TO DO ANYTHING, OR PREPARED TO DO ANYTHING TO END YOUR LIFE?: NO

## 2024-01-13 ASSESSMENT — PAIN SCALES - GENERAL: PAINLEVEL_OUTOF10: 5 - MODERATE PAIN

## 2024-01-13 ASSESSMENT — PAIN DESCRIPTION - ORIENTATION: ORIENTATION: RIGHT

## 2024-01-13 ASSESSMENT — PAIN - FUNCTIONAL ASSESSMENT: PAIN_FUNCTIONAL_ASSESSMENT: 0-10

## 2024-01-13 NOTE — ED PROVIDER NOTES
EMERGENCY DEPARTMENT ENCOUNTER      Pt Name: Gaurav Fischer  MRN: [id number]  Birthdate 1950  Date of evaluation: 1/13/2024  ED Provider: Effie Devlin DO     CHIEF COMPLAINT       Chief Complaint   Patient presents with    Hip Pain     Pt stated he has been having right hip pain since Monday. Pt stated it seems to hurt more when he lays down. Pt denies POP and denies any pain until he lays down. Pt had a steady gait on arrival.       HISTORY OF PRESENT ILLNESS    Gaurav Fischer is a 73 y.o. who presents to the emergency department via private vehicle with complaints of atraumatic right hip pain.  He is on urgent care 2 days ago and had an x-ray that showed degenerative changes.  He has an appointment with his primary care next Friday and an appointment with orthopedics on Monday.  However he states the pain has been getting progressively worse at night to the point that he has been unable to sleep.  He states the pain improves when he is moving around and he is more comfortable standing or walking.  The pain.  There is no injury to the area.  He cannot think of any specific mechanical insult to the area.  No other acute complaints.  No history of hip replacement.    Nursing Notes were reviewed.    Outside historians: none  REVIEW OF SYSTEMS     Focused ROS performed and negative other than as listed in HPI    PAST MEDICAL HISTORY     Past Medical History:   Diagnosis Date    Dependence on other enabling machines and devices     History of continuous positive airway pressure (CPAP) therapy at home    Encounter for general adult medical examination without abnormal findings 07/12/2019    Welcome to Medicare preventive visit    Personal history of other endocrine, nutritional and metabolic disease 03/03/2015    History of type 2 multiple endocrine neoplasia (MEN)       SURGICAL HISTORY       Past Surgical History:   Procedure Laterality Date    APPENDECTOMY  03/03/2015    Appendectomy    COLONOSCOPY  03/03/2015     Complete Colonoscopy    HERNIA REPAIR  03/03/2015    Hernia Repair    OTHER SURGICAL HISTORY  02/17/2017    Laminectomy Decompressive Up To Two Lumbar Segments    OTHER SURGICAL HISTORY  04/08/2021    Back surgery    SINUS SURGERY  03/03/2015    Sinus Surgery    TONSILLECTOMY  03/03/2015    Tonsillectomy    TOTAL THYROIDECTOMY  01/05/2022    Thyroid Surgery Total Thyroidectomy       CURRENT MEDICATIONS       Previous Medications    AMLODIPINE (NORVASC) 5 MG TABLET    Take 1 tablet (5 mg) by mouth once daily.    ASPIRIN 81 MG EC TABLET    Take 1 tablet (81 mg) by mouth once daily.    CETIRIZINE (ZYRTEC) 10 MG TABLET    Take 1 tablet (10 mg) by mouth if needed.    FAMOTIDINE (PEPCID) 20 MG TABLET    Take 1 tablet (20 mg) by mouth once daily.    HYDROCORTISONE 1 % OINTMENT    Apply topically if needed.    LEVOTHYROXINE (SYNTHROID, LEVOXYL) 112 MCG TABLET    Take 1 tablet (112 mcg) by mouth once daily in the morning. Take before meals.    LOVASTATIN (MEVACOR) 40 MG TABLET    Take 1 tablet (40 mg) by mouth once daily at bedtime.    MELATONIN ORAL    Take 1 mg by mouth. Take as directed    NEOMYCIN-POLYMYXIN-PRAMOXINE (NEOSPORIN) 3.5-10,000-10 MG-UNIT-MG/GRAM CREAM    Apply topically.    TADALAFIL (CIALIS) 5 MG TABLET    Take 1 tablet (5 mg) by mouth once daily.    TAMSULOSIN (FLOMAX) 0.4 MG 24 HR CAPSULE    Take 1 capsule (0.4 mg) by mouth once daily.    TRIAMCINOLONE (NASACORT) 55 MCG NASAL INHALER    Administer into affected nostril(s).       ALLERGIES     Sulfamethoxazole-trimethoprim, Griseofulvin, and Pollen extracts    FAMILY HISTORY       Family History   Problem Relation Name Age of Onset    Breast cancer Mother      Other (mesothelioma) Father      Breast cancer Sister      Diabetes type II Sister          SOCIAL HISTORY       Social History     Socioeconomic History    Marital status:      Spouse name: Not on file    Number of children: Not on file    Years of education: Not on file    Highest  education level: Not on file   Occupational History    Not on file   Tobacco Use    Smoking status: Never    Smokeless tobacco: Never   Substance and Sexual Activity    Alcohol use: Yes     Alcohol/week: 3.0 standard drinks of alcohol     Types: 3 Cans of beer per week    Drug use: Never    Sexual activity: Not on file   Other Topics Concern    Not on file   Social History Narrative    Not on file     Social Determinants of Health     Financial Resource Strain: Not on file   Food Insecurity: Not on file   Transportation Needs: Not on file   Physical Activity: Not on file   Stress: Not on file   Social Connections: Not on file   Intimate Partner Violence: Not on file   Housing Stability: Not on file       PHYSICAL EXAM       ED Triage Vitals [01/13/24 0501]   Temp Heart Rate Resp BP   36.3 °C (97.3 °F) 77 18 155/85      SpO2 Temp Source Heart Rate Source Patient Position   98 % Oral -- Sitting      BP Location FiO2 (%)     Left arm --        General: Appears well, no acute distress, alert  Head: Head atraumatic; normocephalic  Eyes: normal inspection; no icterus  ENT: mucosa moist without lesion  Neck: Normal inspection, no meningeal signs  Resp: Normal breath sounds, no wheeze or crackles; No respiratory distress  Chest Wall: no tenderness or deformity  Heart: Heart rate and rhythm regular; No Murmurs  Abdomen: Soft, Non-tender; No distention, guarding, rigidity, or rebound  MSK: Normal appearance; Moves all extremities; No Pedal edema; no tenderness to palpation of SI joint on R, ROM to R hip intact to internal, external rotation, abd/adduction, flexion. No pain on KIANNA testing, no tenderness to palpation of joint or bursa  Neuro: Alert; no focal deficits, moves all extremities  Psych: Mood and Affect normal  Skin: Color appropriate; warm; Dry      EMERGENCY DEPARTMENT COURSE/MDM:   Patient presents with atraumatic hip pain.  He has good range of motion on exam without tenderness.  I have low suspicion for acute  septic joint.  Outpatient x-rays are reviewed which show degenerative changes but no acute findings.  Symptoms are suggestive of possible arthropathy of the SI joint or hip.  Patient will be given a prescription for tramadol for use at night and is otherwise encouraged to use Tylenol regularly.  He is to keep his appointment on Monday with orthopedics.  He verbalized understanding and agrees with this plan of care.  Discharged in stable condition      Diagnoses as of 01/13/24 0747   Pain of right hip     Meds Administered:  Medications - No data to display  PROCEDURES:  Unless otherwise noted below, none  Procedures      FINAL IMPRESSION      1. Pain of right hip          DISPOSITION    Discharge 01/13/2024 07:43:05 AM    DISCHARGE   PATIENT REFERRED TO:  Renetta Barnett MD  [address] [facility] [address]  [place] [id number]  [phone]            DISCHARGE MEDICATIONS:  Discharge Medication List as of 1/13/2024  7:45 AM        START taking these medications    Details   traMADol (Ultram) 50 mg tablet Take 1 tablet (50 mg) by mouth every 6 hours if needed for severe pain (7 - 10) for up to 3 days., Starting Sat 1/13/2024, Until Tue 1/16/2024 at 2359, Normal              The patient is discharged back to their place of residence.  Discharge diagnosis, instructions and plan were discussed and understood. At the time of discharge the patient was comfortable and was in no apparent distress. Patient is aware of diagnostic uncertainty and was notified though testing is negative here, there is a very small chance that pathology may be missed.  The patient understands these risks and the patient /family understood to return immediately to the emergency department if the symptoms worsen or if they have any additional concerns.      (Comment: Please note this report has been produced using speech recognition software and may contain errors related to that system including errors in grammar, punctuation, and spelling, as  well as words and phrases that may be inappropriate.  If there are any questions or concerns please feel free to contact the dictating provider for clarification.)    Effie Devlin DO (electronically signed)  Emergency Medicine Physician    Medical Decision Making             Effie Devlin DO  01/13/24 0809

## 2024-01-15 ENCOUNTER — OFFICE VISIT (OUTPATIENT)
Dept: ORTHOPEDIC SURGERY | Facility: CLINIC | Age: 74
End: 2024-01-15
Payer: MEDICARE

## 2024-01-15 VITALS — BODY MASS INDEX: 29.99 KG/M2 | WEIGHT: 209 LBS

## 2024-01-15 DIAGNOSIS — M70.61 TROCHANTERIC BURSITIS OF RIGHT HIP: Primary | ICD-10-CM

## 2024-01-15 DIAGNOSIS — M25.551 ACUTE RIGHT HIP PAIN: ICD-10-CM

## 2024-01-15 DIAGNOSIS — M47.816 PRIMARY OSTEOARTHRITIS OF LUMBAR SPINE: ICD-10-CM

## 2024-01-15 PROCEDURE — 1159F MED LIST DOCD IN RCRD: CPT | Performed by: PHYSICIAN ASSISTANT

## 2024-01-15 PROCEDURE — 20610 DRAIN/INJ JOINT/BURSA W/O US: CPT | Performed by: PHYSICIAN ASSISTANT

## 2024-01-15 PROCEDURE — 99213 OFFICE O/P EST LOW 20 MIN: CPT | Performed by: PHYSICIAN ASSISTANT

## 2024-01-15 PROCEDURE — 2500000005 HC RX 250 GENERAL PHARMACY W/O HCPCS: Performed by: PHYSICIAN ASSISTANT

## 2024-01-15 PROCEDURE — 1036F TOBACCO NON-USER: CPT | Performed by: PHYSICIAN ASSISTANT

## 2024-01-15 PROCEDURE — 99203 OFFICE O/P NEW LOW 30 MIN: CPT | Performed by: PHYSICIAN ASSISTANT

## 2024-01-15 PROCEDURE — 1126F AMNT PAIN NOTED NONE PRSNT: CPT | Performed by: PHYSICIAN ASSISTANT

## 2024-01-15 PROCEDURE — 2500000004 HC RX 250 GENERAL PHARMACY W/ HCPCS (ALT 636 FOR OP/ED): Performed by: PHYSICIAN ASSISTANT

## 2024-01-15 RX ORDER — SILDENAFIL CITRATE 20 MG/1
TABLET ORAL
COMMUNITY
Start: 2019-07-12

## 2024-01-15 RX ORDER — DOXYCYCLINE HYCLATE 100 MG
TABLET ORAL EVERY 12 HOURS
COMMUNITY
Start: 2020-04-22

## 2024-01-15 RX ORDER — ROSUVASTATIN CALCIUM 20 MG/1
TABLET, COATED ORAL
COMMUNITY
Start: 2021-10-18

## 2024-01-15 RX ORDER — PHENAZOPYRIDINE HYDROCHLORIDE 200 MG/1
TABLET, FILM COATED ORAL
COMMUNITY
Start: 2020-04-22 | End: 2024-01-19 | Stop reason: ENTERED-IN-ERROR

## 2024-01-15 RX ORDER — LIDOCAINE HYDROCHLORIDE 10 MG/ML
1 INJECTION INFILTRATION; PERINEURAL
Status: COMPLETED | OUTPATIENT
Start: 2024-01-15 | End: 2024-01-15

## 2024-01-15 RX ORDER — NEOMYCIN SULFATE, POLYMYXIN B SULFATE, AND DEXAMETHASONE 3.5; 10000; 1 MG/G; [USP'U]/G; MG/G
OINTMENT OPHTHALMIC
COMMUNITY
Start: 2022-08-19

## 2024-01-15 RX ORDER — DESOXIMETASONE 2.5 MG/G
CREAM TOPICAL
COMMUNITY
Start: 2016-10-26

## 2024-01-15 RX ORDER — TRIAMCINOLONE ACETONIDE 40 MG/ML
10 INJECTION, SUSPENSION INTRA-ARTICULAR; INTRAMUSCULAR
Status: COMPLETED | OUTPATIENT
Start: 2024-01-15 | End: 2024-01-15

## 2024-01-15 RX ADMIN — TRIAMCINOLONE ACETONIDE 10 MG: 40 INJECTION, SUSPENSION INTRA-ARTICULAR; INTRAMUSCULAR at 10:34

## 2024-01-15 RX ADMIN — LIDOCAINE HYDROCHLORIDE 1 ML: 10 INJECTION, SOLUTION INFILTRATION; PERINEURAL at 10:34

## 2024-01-15 ASSESSMENT — PATIENT HEALTH QUESTIONNAIRE - PHQ9
2. FEELING DOWN, DEPRESSED OR HOPELESS: NOT AT ALL
1. LITTLE INTEREST OR PLEASURE IN DOING THINGS: NOT AT ALL
SUM OF ALL RESPONSES TO PHQ9 QUESTIONS 1 AND 2: 0

## 2024-01-15 ASSESSMENT — ENCOUNTER SYMPTOMS
LOSS OF SENSATION IN FEET: 0
OCCASIONAL FEELINGS OF UNSTEADINESS: 0
DEPRESSION: 0

## 2024-01-15 ASSESSMENT — PAIN DESCRIPTION - DESCRIPTORS: DESCRIPTORS: SHARP

## 2024-01-15 ASSESSMENT — PAIN - FUNCTIONAL ASSESSMENT: PAIN_FUNCTIONAL_ASSESSMENT: 0-10

## 2024-01-15 ASSESSMENT — PAIN SCALES - GENERAL: PAINLEVEL_OUTOF10: 7

## 2024-01-15 ASSESSMENT — LIFESTYLE VARIABLES: TOTAL SCORE: 0

## 2024-01-15 NOTE — PATIENT INSTRUCTIONS
Thank you for coming to see us today!     Continue to use tylenol for pain control.   Rest, ice and elevate and remember to do exercises like walking.   We gave you a cortisone injection injection for right hip bursitis  We are going to give you a referral for pain management for further evaluation of your lumbar spine    Follow up as needed

## 2024-01-15 NOTE — PROGRESS NOTES
Subjective      Chief Complaint   Patient presents with    Right Hip - Pain    Lower Back - Pain        No surgery found     HPI  Presents today with right hip pain (8/10). The patient states that this right hip pain has been worsening and persistent since Monday 1/8/2024. The patient denies trauma or injury to the right hip. He went to the ED on 1/13/2024 and x-rays showed no acute fracture of the right hip. He was given tramadol for the right hip pain. The patient states that the right hip pain is worse with and aggravated by prolonged walking and standing and also by sleeping at night.  The patient states that this right hip pain impairs their ability to complete normal activities of daily living. He does note pain at the lumbar spine and right SI area. He denies any shooting pain at the right or left legs. He denies numbness and saddle anesthesia. The patient has tried tylenol and ibuprofen with no relief.    CARDIOLOGY:   Negative for chest pain, shortness of breath.   RESPIRATORY:   Negative for chest pain, shortness of breath.   MUSCULOSKELETAL:   See HPI for details.   NEUROLOGY:   Negative for tingling, numbness, weakness.  Negative for saddle anesthesia or loss of bladder control.     Objective      There were no vitals taken for this visit.     Physical Exam  Constitutional: Appears stated age. No apparent distress  Labored Breathing: No  Psychiatric: Normal mood and effect.   Neurological: alert and oriented x3  Skin: intact  HEENT: No bruising, otorrhea, rhinorrhea.  MUSCULOSKELETAL: Neck: No tenderness. No pain or limitation with range of motion. Back: No tenderness. Straight leg test negative bilaterally.Mild tenderness to palpation over the right SI joint.  right hip: There is tenderness at the greater trochanter. There is not pain with gentle ROM in flexion and extension at the hip. There is not pain with external rotation and abduction. right lower extremity is in good position. Nontender at the  calf. Neurovascular is intact. The patient is seen walking today none gait.    XR hip right with pelvis when performed 2 or 3 views    Result Date: 1/11/2024  Interpreted By:  Bari Mauricio, STUDY: XR HIP RIGHT WITH PELVIS WHEN PERFORMED 2 OR 3 VIEWS 1/11/2024 1:04 pm   INDICATION: Signs/Symptoms:Pain   COMPARISON: None.   ACCESSION NUMBER(S): [id number]   ORDERING CLINICIAN: MICHAEL MANCIA   TECHNIQUE: A single AP view of the pelvis as well as AP and lateral views of the right hip were obtained.   FINDINGS: There is no evidence of acute fracture or dislocation identified. Mild-to-moderate hypertrophic degenerative changes are seen in the sacroiliac joints bilaterally. Mild degenerative changes are seen in the left hip and pubic symphysis. Minimal degenerative changes are seen in the right hip. Rounded calcifications are seen with throughout the pelvis, most consistent with phleboliths.       1.  No fracture or dislocation. 2. Degenerative changes, as described above.   MACRO: None.   Signed by: Bari Mauricio 1/11/2024 1:13 PM Dictation workstation:   [id number]      AP and lateral x-rays of the lumbar spine reviewed from 10- shows   IMPRESSION:  1. Moderate L4-5 and L5-S1 degenerative changes.  2. Unremarkable radiographs of the sacrum and coccyx.    Patient ID: Gaurav Fischer is a 73 y.o. male.    L Inj/Asp: R greater trochanteric bursa on 1/15/2024 10:34 AM  Indications: pain  Details: 22 G needle, lateral approach  Medications: 1 mL lidocaine 10 mg/mL (1 %); 10 mg triamcinolone acetonide 40 mg/mL  Outcome: tolerated well, no immediate complications  Procedure, treatment alternatives, risks and benefits explained, specific risks discussed. Immediately prior to procedure a time out was called to verify the correct patient, procedure, equipment, support staff and site/side marked as required. Patient was prepped and draped in the usual sterile fashion.         Gaurav was seen today for pain and  pain.  Diagnoses and all orders for this visit:  Trochanteric bursitis of right hip (Primary)  Acute right hip pain  -     Referral to Orthopaedic Surgery  Primary osteoarthritis of lumbar spine  -     Referral to Pain Management; Future  Options are discussed with the patient in detail.  The patient is given a referral to pain management for further evaluation and treatment of his lumbar osteoarthritis.  The patient is instructed regarding activity modification, ice, provider directed at home gentle strengthening and ROM exercises, and the appropriate use of Tylenol as needed for pain with its potential adverse reactions and side effects. The patient understands. The patient states that despite all the treatment listed above that this right hip pain is debilitating and  requests a discussion of further options. Cortisone injection to the right hip at the greater trochanter is discussed in the office today. This is done in the office today. See procedures below. Return as needed, Please note that this report has been produced using speech recognition software.  It may contain errors related to grammar, punctuation or spelling.  Electronically signed, but not reviewed.  Ailyn Hunt PA-C

## 2024-01-19 ENCOUNTER — LAB (OUTPATIENT)
Dept: LAB | Facility: LAB | Age: 74
End: 2024-01-19
Payer: MEDICARE

## 2024-01-19 ENCOUNTER — OFFICE VISIT (OUTPATIENT)
Dept: PRIMARY CARE | Facility: CLINIC | Age: 74
End: 2024-01-19
Payer: MEDICARE

## 2024-01-19 VITALS
HEART RATE: 72 BPM | BODY MASS INDEX: 29.23 KG/M2 | OXYGEN SATURATION: 98 % | DIASTOLIC BLOOD PRESSURE: 76 MMHG | HEIGHT: 70 IN | WEIGHT: 204.2 LBS | SYSTOLIC BLOOD PRESSURE: 126 MMHG

## 2024-01-19 DIAGNOSIS — I10 PRIMARY HYPERTENSION: ICD-10-CM

## 2024-01-19 DIAGNOSIS — R35.0 URINARY FREQUENCY: ICD-10-CM

## 2024-01-19 DIAGNOSIS — C73 MEDULLARY THYROID CARCINOMA (MULTI): ICD-10-CM

## 2024-01-19 DIAGNOSIS — M54.32 SCIATICA OF LEFT SIDE: ICD-10-CM

## 2024-01-19 DIAGNOSIS — D64.9 ANEMIA, UNSPECIFIED TYPE: ICD-10-CM

## 2024-01-19 DIAGNOSIS — N40.1 BPH WITH OBSTRUCTION/LOWER URINARY TRACT SYMPTOMS: ICD-10-CM

## 2024-01-19 DIAGNOSIS — N40.1 BENIGN PROSTATIC HYPERPLASIA WITH URINARY FREQUENCY: ICD-10-CM

## 2024-01-19 DIAGNOSIS — I73.9 CLAUDICATION, INTERMITTENT (CMS-HCC): ICD-10-CM

## 2024-01-19 DIAGNOSIS — Z00.00 MEDICARE ANNUAL WELLNESS VISIT, SUBSEQUENT: Primary | Chronic | ICD-10-CM

## 2024-01-19 DIAGNOSIS — E89.0 H/O TOTAL THYROIDECTOMY: ICD-10-CM

## 2024-01-19 DIAGNOSIS — E03.9 HYPOTHYROIDISM, UNSPECIFIED TYPE: ICD-10-CM

## 2024-01-19 DIAGNOSIS — G47.33 OBSTRUCTIVE SLEEP APNEA OF ADULT: ICD-10-CM

## 2024-01-19 DIAGNOSIS — R39.15 URINARY URGENCY: ICD-10-CM

## 2024-01-19 DIAGNOSIS — E03.9 ACQUIRED HYPOTHYROIDISM: ICD-10-CM

## 2024-01-19 DIAGNOSIS — N13.8 BPH WITH OBSTRUCTION/LOWER URINARY TRACT SYMPTOMS: ICD-10-CM

## 2024-01-19 DIAGNOSIS — R35.0 BENIGN PROSTATIC HYPERPLASIA WITH URINARY FREQUENCY: ICD-10-CM

## 2024-01-19 DIAGNOSIS — Z00.00 MEDICARE ANNUAL WELLNESS VISIT, SUBSEQUENT: Chronic | ICD-10-CM

## 2024-01-19 DIAGNOSIS — E88.01 ALPHA-1-ANTITRYPSIN DEFICIENCY (MULTI): ICD-10-CM

## 2024-01-19 PROBLEM — M54.42 CHRONIC LEFT-SIDED LOW BACK PAIN WITH LEFT-SIDED SCIATICA: Status: ACTIVE | Noted: 2024-01-19

## 2024-01-19 PROBLEM — G89.29 CHRONIC LEFT-SIDED LOW BACK PAIN WITH LEFT-SIDED SCIATICA: Status: ACTIVE | Noted: 2024-01-19

## 2024-01-19 LAB
ALBUMIN SERPL BCP-MCNC: 4.8 G/DL (ref 3.4–5)
ALP SERPL-CCNC: 70 U/L (ref 33–136)
ALT SERPL W P-5'-P-CCNC: 19 U/L (ref 10–52)
ANION GAP SERPL CALC-SCNC: 11 MMOL/L (ref 10–20)
APPEARANCE UR: CLEAR
AST SERPL W P-5'-P-CCNC: 26 U/L (ref 9–39)
BASOPHILS # BLD AUTO: 0.01 X10*3/UL (ref 0–0.1)
BASOPHILS NFR BLD AUTO: 0.2 %
BILIRUB SERPL-MCNC: 0.6 MG/DL (ref 0–1.2)
BILIRUB UR STRIP.AUTO-MCNC: NEGATIVE MG/DL
BUN SERPL-MCNC: 24 MG/DL (ref 6–23)
CALCIUM SERPL-MCNC: 9.7 MG/DL (ref 8.6–10.6)
CHLORIDE SERPL-SCNC: 103 MMOL/L (ref 98–107)
CHOLEST SERPL-MCNC: 156 MG/DL (ref 0–199)
CHOLESTEROL/HDL RATIO: 3.4
CO2 SERPL-SCNC: 30 MMOL/L (ref 21–32)
COLOR UR: NORMAL
CREAT SERPL-MCNC: 1.17 MG/DL (ref 0.5–1.3)
EGFRCR SERPLBLD CKD-EPI 2021: 66 ML/MIN/1.73M*2
EOSINOPHIL # BLD AUTO: 0.1 X10*3/UL (ref 0–0.4)
EOSINOPHIL NFR BLD AUTO: 1.8 %
ERYTHROCYTE [DISTWIDTH] IN BLOOD BY AUTOMATED COUNT: 13 % (ref 11.5–14.5)
GLUCOSE SERPL-MCNC: 102 MG/DL (ref 74–99)
GLUCOSE UR STRIP.AUTO-MCNC: NEGATIVE MG/DL
HCT VFR BLD AUTO: 39.9 % (ref 41–52)
HDLC SERPL-MCNC: 45.6 MG/DL
HGB BLD-MCNC: 12.6 G/DL (ref 13.5–17.5)
IMM GRANULOCYTES # BLD AUTO: 0.02 X10*3/UL (ref 0–0.5)
IMM GRANULOCYTES NFR BLD AUTO: 0.4 % (ref 0–0.9)
KETONES UR STRIP.AUTO-MCNC: NEGATIVE MG/DL
LDLC SERPL CALC-MCNC: 94 MG/DL
LEUKOCYTE ESTERASE UR QL STRIP.AUTO: NEGATIVE
LYMPHOCYTES # BLD AUTO: 1.32 X10*3/UL (ref 0.8–3)
LYMPHOCYTES NFR BLD AUTO: 23.2 %
MCH RBC QN AUTO: 27.3 PG (ref 26–34)
MCHC RBC AUTO-ENTMCNC: 31.6 G/DL (ref 32–36)
MCV RBC AUTO: 87 FL (ref 80–100)
MONOCYTES # BLD AUTO: 0.52 X10*3/UL (ref 0.05–0.8)
MONOCYTES NFR BLD AUTO: 9.1 %
NEUTROPHILS # BLD AUTO: 3.73 X10*3/UL (ref 1.6–5.5)
NEUTROPHILS NFR BLD AUTO: 65.3 %
NITRITE UR QL STRIP.AUTO: NEGATIVE
NON HDL CHOLESTEROL: 110 MG/DL (ref 0–149)
NRBC BLD-RTO: 0 /100 WBCS (ref 0–0)
PH UR STRIP.AUTO: 7 [PH]
PLATELET # BLD AUTO: 295 X10*3/UL (ref 150–450)
POTASSIUM SERPL-SCNC: 5.1 MMOL/L (ref 3.5–5.3)
PROT SERPL-MCNC: 7.3 G/DL (ref 6.4–8.2)
PROT UR STRIP.AUTO-MCNC: NEGATIVE MG/DL
PSA SERPL-MCNC: 1.6 NG/ML
RBC # BLD AUTO: 4.61 X10*6/UL (ref 4.5–5.9)
RBC # UR STRIP.AUTO: NEGATIVE /UL
SODIUM SERPL-SCNC: 139 MMOL/L (ref 136–145)
SP GR UR STRIP.AUTO: 1.01
T4 FREE SERPL-MCNC: 1.19 NG/DL (ref 0.78–1.48)
TRIGL SERPL-MCNC: 82 MG/DL (ref 0–149)
TSH SERPL-ACNC: 3.99 MIU/L (ref 0.44–3.98)
UROBILINOGEN UR STRIP.AUTO-MCNC: <2 MG/DL
VLDL: 16 MG/DL (ref 0–40)
WBC # BLD AUTO: 5.7 X10*3/UL (ref 4.4–11.3)

## 2024-01-19 PROCEDURE — 1126F AMNT PAIN NOTED NONE PRSNT: CPT | Performed by: INTERNAL MEDICINE

## 2024-01-19 PROCEDURE — 81003 URINALYSIS AUTO W/O SCOPE: CPT

## 2024-01-19 PROCEDURE — 3074F SYST BP LT 130 MM HG: CPT | Performed by: INTERNAL MEDICINE

## 2024-01-19 PROCEDURE — 87086 URINE CULTURE/COLONY COUNT: CPT

## 2024-01-19 PROCEDURE — 1036F TOBACCO NON-USER: CPT | Performed by: INTERNAL MEDICINE

## 2024-01-19 PROCEDURE — 80053 COMPREHEN METABOLIC PANEL: CPT

## 2024-01-19 PROCEDURE — 85025 COMPLETE CBC W/AUTO DIFF WBC: CPT

## 2024-01-19 PROCEDURE — 84439 ASSAY OF FREE THYROXINE: CPT

## 2024-01-19 PROCEDURE — 84443 ASSAY THYROID STIM HORMONE: CPT

## 2024-01-19 PROCEDURE — 80061 LIPID PANEL: CPT

## 2024-01-19 PROCEDURE — 84153 ASSAY OF PSA TOTAL: CPT

## 2024-01-19 PROCEDURE — 3078F DIAST BP <80 MM HG: CPT | Performed by: INTERNAL MEDICINE

## 2024-01-19 PROCEDURE — 82308 ASSAY OF CALCITONIN: CPT

## 2024-01-19 PROCEDURE — 1159F MED LIST DOCD IN RCRD: CPT | Performed by: INTERNAL MEDICINE

## 2024-01-19 PROCEDURE — 99397 PER PM REEVAL EST PAT 65+ YR: CPT | Performed by: INTERNAL MEDICINE

## 2024-01-19 PROCEDURE — 1160F RVW MEDS BY RX/DR IN RCRD: CPT | Performed by: INTERNAL MEDICINE

## 2024-01-19 PROCEDURE — G0439 PPPS, SUBSEQ VISIT: HCPCS | Performed by: INTERNAL MEDICINE

## 2024-01-19 PROCEDURE — 1170F FXNL STATUS ASSESSED: CPT | Performed by: INTERNAL MEDICINE

## 2024-01-19 PROCEDURE — 36415 COLL VENOUS BLD VENIPUNCTURE: CPT

## 2024-01-19 ASSESSMENT — ENCOUNTER SYMPTOMS
BACK PAIN: 1
NUMBNESS: 0
WEAKNESS: 0
CONSTITUTIONAL NEGATIVE: 1

## 2024-01-19 ASSESSMENT — PATIENT HEALTH QUESTIONNAIRE - PHQ9
1. LITTLE INTEREST OR PLEASURE IN DOING THINGS: NOT AT ALL
SUM OF ALL RESPONSES TO PHQ9 QUESTIONS 1 AND 2: 0
2. FEELING DOWN, DEPRESSED OR HOPELESS: NOT AT ALL

## 2024-01-19 ASSESSMENT — ACTIVITIES OF DAILY LIVING (ADL)
DRESSING: INDEPENDENT
MANAGING_FINANCES: INDEPENDENT
DOING_HOUSEWORK: INDEPENDENT
TAKING_MEDICATION: INDEPENDENT
BATHING: INDEPENDENT
GROCERY_SHOPPING: INDEPENDENT

## 2024-01-19 NOTE — PROGRESS NOTES
"Subjective   Reason for Visit: Gaurav Fischer is an 73 y.o. male here for a Medicare Wellness visit.     Past Medical, Surgical, and Family History reviewed and updated in chart.    Reviewed all medications by prescribing practitioner or clinical pharmacist (such as prescriptions, OTCs, herbal therapies and supplements) and documented in the medical record.    HPI      Having pain lower back  Radiating to the back of my left thigh  This has been going on for more than a week  But this has been a chronic problem in the past  No tingling or numbness in the feet  No extremity weakness  No bladder or bowel incontinence      Patient seen in the urgent care  And also in the emergency    He was given cortisone injection on the right bursa on the right hip      I am also having urinary frequency  Urinary urgency  I would like to see the urologist      Hypertension on medication controlled  No chest pain, no shortness of breath, no PND, no orthopnea,  No leg swelling, no palpitation, no headache,      He had medullary carcinoma of the thyroid  Status post total thyroidectomy    Idiopathic hypothyroidism        Patient Care Team:  Renetta Barnett MD as PCP - General  Renetta Barnett MD as PCP - [employer] PCP     Review of Systems   Constitutional: Negative.    Musculoskeletal:  Positive for back pain.        Presented radiating to the back of the left thigh  He recently had a cortisone injection of the right hip over the right trochanteric bursa   Neurological:  Negative for weakness and numbness.   All other systems reviewed and are negative.      Objective   Vitals:  /76   Pulse 72   Ht 1.765 m (5' 9.5\")   Wt 92.6 kg (204 lb 3.2 oz)   SpO2 98%   BMI 29.72 kg/m²       Physical Exam  Vitals and nursing note reviewed.   Neck:      Vascular: No carotid bruit.   Cardiovascular:      Rate and Rhythm: Normal rate and regular rhythm.      Pulses: Normal pulses.      Heart sounds: Normal heart " sounds.   Pulmonary:      Effort: Pulmonary effort is normal.      Breath sounds: Normal breath sounds.   Abdominal:      General: Abdomen is flat.      Palpations: Abdomen is soft.   Musculoskeletal:      Right lower leg: No edema.      Left lower leg: No edema.      Comments: Lumbar flexion extension normal  No lumbar spine tenderness  Both hips range of motion good  With good internal and external rotation  And abduction and adduction    There is no tenderness over the right greater trochanter of the right hip  SLR right + left normal    Motor strength right lower extremity 5/5  Motor strength strength left lower extremity 5/5   Lymphadenopathy:      Cervical: No cervical adenopathy.   Skin:     Capillary Refill: Capillary refill takes more than 3 seconds.   Neurological:      General: No focal deficit present.      Mental Status: He is oriented to person, place, and time. Mental status is at baseline.   Psychiatric:         Mood and Affect: Mood normal.         Behavior: Behavior normal.         Thought Content: Thought content normal.         Judgment: Judgment normal.         Assessment/Plan   Problem List Items Addressed This Visit       Primary hypertension    Acquired hypothyroidism    Alpha-1-antitrypsin deficiency (CMS/HCC)    Current Assessment & Plan     STABLE         BPH with obstruction/lower urinary tract symptoms    Claudication, intermittent (CMS/HCC)    Current Assessment & Plan     STABLE         H/O total thyroidectomy    Medullary thyroid carcinoma (CMS/HCC)    Current Assessment & Plan     STABLE         Relevant Orders    Calcitonin    Obstructive sleep apnea of adult     Other Visit Diagnoses       Medicare annual wellness visit, subsequent  (Chronic)   -  Primary    Relevant Orders    Comprehensive metabolic panel    Lipid panel    Prostate Spec.Ag,Screen    Sciatica of left side        Relevant Orders    Referral to Physical Therapy    EMG & nerve conduction    Hypothyroidism, unspecified  type        Relevant Orders    Tsh With Reflex To Free T4 If Abnormal    Anemia, unspecified type        Relevant Orders    CBC and Auto Differential    Benign prostatic hyperplasia with urinary frequency        Relevant Orders    Urinalysis with Reflex Microscopic    Urine Culture    Referral to Urology    Urinary frequency        Relevant Orders    Referral to Urology    Urinary urgency        Relevant Orders    Referral to Urology                A/P         Comprehensive metabolic panel, lipid, PSA, TSH,  Calcitonin , urine analysis, urine culture  Referral to physical therapy  Referral for EMG  Referral neurology  Follow-up if needed          Advance Care Planning Note     Discussion Date: 01/19/24   Discussion Participants: patient    The patient wishes to discuss Advance Care Planning today and the following is a brief summary of our discussion.     Patient has capacity to make their own medical decisions: Yes  Health Care Agent/Surrogate Decision Maker documented in chart:     Documents on file and valid:  Advance Directive/Living Will: No   Health Care Power of : No  Other:     Communication of Medical Status/Prognosis:        Communication of Treatment Goals/Options:       Discussed with the patient end-of-life choices present is patient is presently full code, I do not see any living will or healthcare power of  into the chart I told him he needs to get it done and bring it on next office visit so that it can be scanned into the chart for the purpose of documentation    Treatment Decisions  Goals of Care: survival is prioritized, if goals for quality or survival can reasonably be achieved       Follow Up Plan      Team Members    Time Statement: Total face to face time spent on advance care planning was 5 minutes with 5 minutes spent in counseling, including the explanation.    Renetta Barnett MD  1/19/2024 10:10 AM

## 2024-01-20 LAB — BACTERIA UR CULT: NO GROWTH

## 2024-01-22 LAB — CALCIT SERPL-MCNC: <2 PG/ML (ref 0–7.5)

## 2024-01-24 ENCOUNTER — APPOINTMENT (OUTPATIENT)
Dept: PAIN MEDICINE | Facility: CLINIC | Age: 74
End: 2024-01-24
Payer: MEDICARE

## 2024-01-26 ENCOUNTER — APPOINTMENT (OUTPATIENT)
Dept: SLEEP MEDICINE | Facility: CLINIC | Age: 74
End: 2024-01-26
Payer: MEDICARE

## 2024-01-29 ENCOUNTER — OFFICE VISIT (OUTPATIENT)
Dept: SLEEP MEDICINE | Facility: CLINIC | Age: 74
End: 2024-01-29
Payer: MEDICARE

## 2024-01-29 VITALS
TEMPERATURE: 97.9 F | HEART RATE: 72 BPM | BODY MASS INDEX: 31.13 KG/M2 | DIASTOLIC BLOOD PRESSURE: 74 MMHG | HEIGHT: 69 IN | WEIGHT: 210.2 LBS | SYSTOLIC BLOOD PRESSURE: 127 MMHG

## 2024-01-29 DIAGNOSIS — G47.33 OBSTRUCTIVE SLEEP APNEA OF ADULT: Primary | ICD-10-CM

## 2024-01-29 DIAGNOSIS — F51.12 INSUFFICIENT SLEEP SYNDROME: ICD-10-CM

## 2024-01-29 DIAGNOSIS — I10 PRIMARY HYPERTENSION: ICD-10-CM

## 2024-01-29 PROCEDURE — 1126F AMNT PAIN NOTED NONE PRSNT: CPT | Performed by: STUDENT IN AN ORGANIZED HEALTH CARE EDUCATION/TRAINING PROGRAM

## 2024-01-29 PROCEDURE — 3078F DIAST BP <80 MM HG: CPT | Performed by: STUDENT IN AN ORGANIZED HEALTH CARE EDUCATION/TRAINING PROGRAM

## 2024-01-29 PROCEDURE — 3008F BODY MASS INDEX DOCD: CPT | Performed by: STUDENT IN AN ORGANIZED HEALTH CARE EDUCATION/TRAINING PROGRAM

## 2024-01-29 PROCEDURE — 99214 OFFICE O/P EST MOD 30 MIN: CPT | Performed by: STUDENT IN AN ORGANIZED HEALTH CARE EDUCATION/TRAINING PROGRAM

## 2024-01-29 PROCEDURE — 1160F RVW MEDS BY RX/DR IN RCRD: CPT | Performed by: STUDENT IN AN ORGANIZED HEALTH CARE EDUCATION/TRAINING PROGRAM

## 2024-01-29 PROCEDURE — 1036F TOBACCO NON-USER: CPT | Performed by: STUDENT IN AN ORGANIZED HEALTH CARE EDUCATION/TRAINING PROGRAM

## 2024-01-29 PROCEDURE — 3074F SYST BP LT 130 MM HG: CPT | Performed by: STUDENT IN AN ORGANIZED HEALTH CARE EDUCATION/TRAINING PROGRAM

## 2024-01-29 PROCEDURE — 1159F MED LIST DOCD IN RCRD: CPT | Performed by: STUDENT IN AN ORGANIZED HEALTH CARE EDUCATION/TRAINING PROGRAM

## 2024-01-29 PROCEDURE — 1123F ACP DISCUSS/DSCN MKR DOCD: CPT | Performed by: STUDENT IN AN ORGANIZED HEALTH CARE EDUCATION/TRAINING PROGRAM

## 2024-01-29 ASSESSMENT — SLEEP AND FATIGUE QUESTIONNAIRES
SATISFACTION_WITH_CURRENT_SLEEP_PATTERN: SATISFIED
HOW LIKELY ARE YOU TO NOD OFF OR FALL ASLEEP WHILE WATCHING TV: MODERATE CHANCE OF DOZING
ESS-CHAD TOTAL SCORE: 11
HOW LIKELY ARE YOU TO NOD OFF OR FALL ASLEEP WHILE SITTING QUIETLY AFTER LUNCH WITHOUT ALCOHOL: WOULD NEVER DOZE
SLEEP_PROBLEM_NOTICEABLE_TO_OTHERS: A LITTLE
SLEEP_PROBLEM_INTERFERES_DAILY_ACTIVITIES: NOT AT ALL NOTICEABLE
HOW LIKELY ARE YOU TO NOD OFF OR FALL ASLEEP WHEN YOU ARE A PASSENGER IN A CAR FOR AN HOUR WITHOUT A BREAK: MODERATE CHANCE OF DOZING
SITING INACTIVE IN A PUBLIC PLACE LIKE A CLASS ROOM OR A MOVIE THEATER: MODERATE CHANCE OF DOZING
DIFFICULTY_STAYING_ASLEEP: MILD
DIFFICULTY_FALLING_ASLEEP: MILD
HOW LIKELY ARE YOU TO NOD OFF OR FALL ASLEEP WHILE SITTING AND TALKING TO SOMEONE: WOULD NEVER DOZE
HOW LIKELY ARE YOU TO NOD OFF OR FALL ASLEEP WHILE LYING DOWN TO REST IN THE AFTERNOON WHEN CIRCUMSTANCES PERMIT: HIGH CHANCE OF DOZING
WORRIED_DISTRESSED_DUE_TO_SLEEP: NOT AT ALL NOTICEABLE
HOW LIKELY ARE YOU TO NOD OFF OR FALL ASLEEP WHILE SITTING AND READING: MODERATE CHANCE OF DOZING
HOW LIKELY ARE YOU TO NOD OFF OR FALL ASLEEP IN A CAR, WHILE STOPPED FOR A FEW MINUTES IN TRAFFIC: WOULD NEVER DOZE
WAKING_TOO_EARLY: MODERATE

## 2024-01-29 ASSESSMENT — ENCOUNTER SYMPTOMS
CARDIOVASCULAR NEGATIVE: 1
NEUROLOGICAL NEGATIVE: 1
RESPIRATORY NEGATIVE: 1
CONSTITUTIONAL NEGATIVE: 1
PSYCHIATRIC NEGATIVE: 1

## 2024-01-29 NOTE — ASSESSMENT & PLAN NOTE
BP Readings from Last 1 Encounters:   01/29/24 127/74     - doing well, asymptomatic  - discussed at length the impact of untreated DEANGELO and BP control  - continue current management and follow-up with PCP

## 2024-01-29 NOTE — PROGRESS NOTES
" Patient: Gaurav Fischer    [id number]  : 1950 -- AGE 73 y.o.    Provider: Loyd Mastres MD     Location [facility]   Service Date: 2024              [facility]  Followup Visit Note    HISTORY OF PRESENT ILLNESS     HISTORY OF PRESENT ILLNESS   Gaurav Fischer \"Sidney\" is a 73 y.o. male with h/o DEANGELO and Obesity who presents to a [facility] for followup.     Assessment and plan from last visit: 2021    72 yo male with h/o BPH, hypothyroidism, HTN, DEANGELO here to establish care with a new sleep provider     # DEANGELO  - mod DEANGELO diagnosed via HST in 2021. (previously diagnosed 10 years ago and was on CPAP for a bit)  - titration showed some TECSA, recommended either BiPAP S/T or CPAP without EPR  - patient is current on .4 with EPR of 3.  - patient found an old CPAP mask (nasal pillow) that he really likes  - continue current setting except changing EPR down to 2 with plan to eventually turn off EPR  - his TECSA may self-resolved in couple of months  - patient is feeling great right now with excellent compliance  - renewed supply order today     # HTN  - /82  - no headache, blurry vision, chest pain, palpitation, dizziness, syncopal episodes   - continue to f/u with PCP      # Overweight  - current BMI 28.7  - Encouraged continuing healthy weight loss via diet and exercise      RTC in 1 year     Current History    On today's visit, the patient reports that there is no issues with his PAP machine.  Doing well with it.  Understands that he may not be having enough sleep time, hence still feeling somewhat sleepy during the day.  He has also been eating a bit more than before, causing weight gain.  BP ok today, no issues, no symptoms    PAP Info  DURABLE MEDICAL EQUIPMENT COMPANY: [employer]  Machine: THERAPY: RESMED AIRSENSE 10  8.6 cm H2O / 11.6 cm H2O   Issues with therapy: ISSUES WITH THERAPY: None  Benefits " with PAP: PERCEIVED BENEFITS OF PAP: refreshing sleep    RLS Followup:   none.    Daytime Symptoms    Patient reports DAYTIME SYMPTOMS: excessively sleepy during the day  Patient denies daytime symptoms including: Denies: feeling sleepy when driving    Naps: No  Fatigue: denies feeling fatigue    ESS: 11  LEN: 6  FOSQ: 36    REVIEW OF SYSTEMS     REVIEW OF SYSTEMS  Review of Systems   Constitutional: Negative.    HENT: Negative.     Respiratory: Negative.     Cardiovascular: Negative.    Genitourinary: Negative.    Skin: Negative.    Neurological: Negative.    Psychiatric/Behavioral: Negative.           ALLERGIES AND MEDICATIONS     ALLERGIES  Allergies   Allergen Reactions   • Sulfamethoxazole-Trimethoprim Anaphylaxis     patient states throat swells   • Griseofulvin Itching     antifungals   • Pollen Extracts Other       MEDICATIONS: He has a current medication list which includes the following prescription(s): amlodipine - Take 1 tablet (5 mg) by mouth once daily, aspirin - Take 1 tablet (81 mg) by mouth once daily, cetirizine - Take 1 tablet (10 mg) by mouth if needed, desoximetasone - Desoximetasone 0.25 % External Cream APPLY AND GENTLY MASSAGE INTO AFFECTED AREA(S) TWICE DAILY. as needed Refills: 0 Start : 26-Oct-2016 Active, famotidine - Take 1 tablet (20 mg) by mouth once daily, hydrocortisone - Apply topically if needed, melatonin - Take 1 mg by mouth. Take as directed, triamcinolone - Administer into affected nostril(s), doxycycline - Take by mouth every 12 hours, levothyroxine - Take 1 tablet (112 mcg) by mouth once daily in the morning. Take before meals, lovastatin - Take 1 tablet (40 mg) by mouth once daily at bedtime, neomycin-polymyxin b-dexameth - Apply to affected eye(s), neomycin-polymyxin-pramoxine - Apply topically, rosuvastatin - Take by mouth once daily, sildenafil - Take by mouth, tadalafil - Take 1 tablet (5 mg) by mouth once daily, and tamsulosin - Take 1 capsule (0.4 mg) by mouth once  "daily.    PAST MEDICAL HISTORY : He  has a past medical history of Dependence on other enabling machines and devices, Encounter for general adult medical examination without abnormal findings (07/12/2019), Hip pain, acute, right, Low back pain, and Personal history of other endocrine, nutritional and metabolic disease (03/03/2015).    PAST SURGICAL HISTORY: He  has a past surgical history that includes Colonoscopy (03/03/2015); Appendectomy (03/03/2015); Tonsillectomy (03/03/2015); Sinus surgery (03/03/2015); Hernia repair (03/03/2015); Other surgical history (02/17/2017); Other surgical history (04/08/2021); and Total thyroidectomy (01/05/2022).     FAMILY HISTORY: No changes since previous visit. Otherwise non-contributory as charted.     SOCIAL HISTORY  He  reports that he has quit smoking. His smoking use included cigars. He has never used smokeless tobacco. He reports current alcohol use of about 3.0 standard drinks of alcohol per week. He reports that he does not use drugs.       PHYSICAL EXAM     VITAL SIGNS: /74 (BP Location: Right arm, Patient Position: Sitting, BP Cuff Size: Adult)   Pulse 72   Temp 36.6 °C (97.9 °F) (Temporal)   Ht 1.753 m (5' 9\")   Wt 95.3 kg (210 lb 3.2 oz)   BMI 31.04 kg/m²      PREVIOUS WEIGHTS:  Wt Readings from Last 3 Encounters:   01/29/24 95.3 kg (210 lb 3.2 oz)   01/19/24 92.6 kg (204 lb 3.2 oz)   01/15/24 94.8 kg (208 lb 15.9 oz)         RESULTS/DATA     Bicarbonate (mmol/L)   Date Value   01/19/2024 30   07/14/2023 28   02/06/2023 31   06/14/2022 28     Iron (ug/dL)   Date Value   10/13/2022 83     Iron Saturation (%)   Date Value   10/13/2022 18 (L)     TIBC (ug/dL)   Date Value   10/13/2022 456 (H)     Ferritin (ug/L)   Date Value   10/13/2022 26       PAP Adherence  A PAP adherence download was obtained and data was reviewed personally today in clinic.        ASSESSMENT/PLAN     Mr. Fischer is a 73 y.o. male and he returns in followup to the [facility] " [facility] for DEANGELO.    Problem List, Orders, Assessment, Recommendations:  Problem List Items Addressed This Visit             ICD-10-CM    Primary hypertension I10     BP Readings from Last 1 Encounters:   01/29/24 127/74   - doing well, asymptomatic  - discussed at length the impact of untreated DEANGELO and BP control  - continue current management and follow-up with PCP            Obstructive sleep apnea of adult G47.33     - doing well with PAP therapy  - continue current setting  - renew PAP supply orders           Relevant Orders    Positive Airway Pressure (PAP) Therapy    Insufficient sleep syndrome - Primary F51.12     Now that he is retired, he has been pushing his bedtime later  Encouraged sleep extension and try to get at least 7.5-8 hours  Pt voiced understand and knows that this would likely help with his sleepiness feeling         BMI 31.0-31.9,adult Z68.31     BMI Readings from Last 1 Encounters:   01/29/24 31.04 kg/m²   - encouraged healthy weight loss via diet and exercise              Disposition    Return to clinic in 12 months

## 2024-01-29 NOTE — ASSESSMENT & PLAN NOTE
BMI Readings from Last 1 Encounters:   01/29/24 31.04 kg/m²     - encouraged healthy weight loss via diet and exercise

## 2024-01-29 NOTE — ASSESSMENT & PLAN NOTE
Now that he is retired, he has been pushing his bedtime later  Encouraged sleep extension and try to get at least 7.5-8 hours  Pt voiced understand and knows that this would likely help with his sleepiness feeling

## 2024-02-02 ENCOUNTER — HOSPITAL ENCOUNTER (OUTPATIENT)
Dept: NEUROLOGY | Facility: CLINIC | Age: 74
Discharge: HOME | End: 2024-02-02
Payer: MEDICARE

## 2024-02-02 DIAGNOSIS — M54.32 SCIATICA OF LEFT SIDE: ICD-10-CM

## 2024-02-02 PROCEDURE — 95909 NRV CNDJ TST 5-6 STUDIES: CPT | Performed by: PSYCHIATRY & NEUROLOGY

## 2024-02-02 PROCEDURE — 95886 MUSC TEST DONE W/N TEST COMP: CPT | Performed by: PSYCHIATRY & NEUROLOGY

## 2024-02-12 ENCOUNTER — EVALUATION (OUTPATIENT)
Dept: PHYSICAL THERAPY | Facility: CLINIC | Age: 74
End: 2024-02-12
Payer: MEDICARE

## 2024-02-12 DIAGNOSIS — M54.32 SCIATICA OF LEFT SIDE: ICD-10-CM

## 2024-02-12 DIAGNOSIS — M54.16 LEFT LUMBAR RADICULOPATHY: Primary | ICD-10-CM

## 2024-02-12 PROCEDURE — 97162 PT EVAL MOD COMPLEX 30 MIN: CPT | Mod: GP | Performed by: PHYSICAL THERAPIST

## 2024-02-12 ASSESSMENT — ENCOUNTER SYMPTOMS
DEPRESSION: 0
LOSS OF SENSATION IN FEET: 1
OCCASIONAL FEELINGS OF UNSTEADINESS: 0

## 2024-02-12 NOTE — PROGRESS NOTES
"Physical Therapy Evaluation and Treatment      Patient Name: Gaurav Fischer \"Sidney\"  MRN: [id number]  Today's Date: 2/12/2024  Time Calculation  Start Time: 0900  Stop Time: 0937  Time Calculation (min): 37 min  Moderate complexity due to patient's clinical presentation being evolving with changing characteristics, with comorbidities to include cancer, HTN, lung disease, all of which may negatively impact rehab tolerance and progression.    Insurance:  Visit number: 1 of 5  Authorization info: EVAL only  Insurance Type: Payor: [employer] MEDICARE / Plan: [employer] MED[employer]ARE ADVANTAGE / Product Type: *No Product type* /     Current Problem:   1. Left lumbar radiculopathy  Follow Up In Physical Therapy      2. Sciatica of left side  Referral to Physical Therapy    Follow Up In Physical Therapy          Subjective    General:  In mid November of 2023 he sat down to tie his shoes really fast and got sharp pain deep below his left buttocks. Was really sharp for a few days, got better for a few days, and then came back and has been around since. Got a new bed January 31st, unsure if to helping or not. Often wakes up with low back pain and left foot pain and numbness. Pain is elicited with certain movements. Standing for any length of time while slightly bent over results in symptoms. No falls.    Plays pool a lot, with left leg leading and pushing through, and would like to continue to play but without pain.      Precautions: None  Pain: 2/10     Objective   ROM   Lumbar spine:  Flex 50% with pain  Ext 25%  Lat flex L/R 50%  Rot L/R 50% with pain to the right    MMT   Left LE strength:  Hip flex 4/5  Knee flex 4+/5  Knee ext 4+/5    Palpation   TTP left piriformis  TTP lumbar spine and vertebrae     Intermittent numbness/tingling in left LE.    Flexibility  Moderate left piriformis, hamstring, and gastroc tightness    Special Tests   SLUMP: positive on left    Transfers   Bilat UE support for sit to stand     Gait   Ambulates with " decreased irma with mild left LE in ER. Mild decrease stance time and step length on left.    Stairs   Ascend and descend steps with step-to-pattern and 1 rail    Outcome Measures:  Low Back Disability / Oswestry: 5/50    Treatments:  Therapeutic Exercise for HEP:  SKTC, seated hamstring stretch, SLR, Bridge    Assessment   Pt is a 73 y.o. male with lumbar radiculopathy into left LE due to possible disc involvement of L5. Pt with decreased ROM, reduced strength, gait deficits, intermittent numbness/tingling, flexibility limitations, and abnormal posture. Pt will benefit from skilled PT to address the above deficits for improvement in functional activities.       Moderate complexity due to patient's clinical presentation being evolving with changing characteristics, with comorbidities to include cancer, HTN, lung disease , all of which may negatively impact rehab tolerance and progression.     Plan:   Treatment/Interventions: Dry needling, Gait training, Manual therapy, Mechanical traction, Neuromuscular re-education, Self care/ home management, Therapeutic activities, Therapeutic exercises  PT Plan: Skilled PT  PT Frequency: 1 time per week  Duration: 4 more visits  Rehab Potential: Good  Plan of Care Agreement: Patient      Goals:   Active       Mobility       Goal 1       Start:  02/12/24    Expected End:  05/12/24       Pt will improve lumbar spine AROM to WNL to improve I/ADLs.         Goal 2       Start:  02/12/24    Expected End:  05/12/24       Pt will improve LE strength to 5/5 to improve I/ADLs            Pain       Goal 1       Start:  02/12/24    Expected End:  05/12/24       Pt will perform all pool playing activities with 0/10 pain         Goal 2       Start:  02/12/24    Expected End:  05/12/24       Pt will stand/walk >20 min with 0/10 pain

## 2024-02-13 ENCOUNTER — TELEPHONE (OUTPATIENT)
Dept: PHYSICAL THERAPY | Facility: CLINIC | Age: 74
End: 2024-02-13

## 2024-02-23 ENCOUNTER — TREATMENT (OUTPATIENT)
Dept: PHYSICAL THERAPY | Facility: CLINIC | Age: 74
End: 2024-02-23
Payer: MEDICARE

## 2024-02-23 DIAGNOSIS — M54.16 LEFT LUMBAR RADICULOPATHY: ICD-10-CM

## 2024-02-23 DIAGNOSIS — M54.32 SCIATICA OF LEFT SIDE: ICD-10-CM

## 2024-02-23 PROCEDURE — 97110 THERAPEUTIC EXERCISES: CPT | Mod: GP,CQ

## 2024-02-23 ASSESSMENT — PAIN SCALES - GENERAL: PAINLEVEL_OUTOF10: 2

## 2024-02-23 ASSESSMENT — PAIN DESCRIPTION - DESCRIPTORS: DESCRIPTORS: SHARP

## 2024-02-23 ASSESSMENT — PAIN - FUNCTIONAL ASSESSMENT: PAIN_FUNCTIONAL_ASSESSMENT: 0-10

## 2024-02-23 NOTE — PROGRESS NOTES
"Physical Therapy Treatment    Patient Name: Gaurav Fischer  MRN: [id number]  Today's Date: 2/23/2024  Time Calculation  Start Time: 1015  Stop Time: 1100  Time Calculation (min): 45 min  PT Therapeutic Procedures Time Entry  Therapeutic Exercise Time Entry: 41    Insurance:  Visit number: 2 of 4  Authorization info: 2/12/2024 - 2/11/2025   Insurance Type: Payor: [employer] MEDICARE / Plan: [employer] MEDICARE ADVANTAGE / Product Type: *No Product type* /     Current Problem   1. Sciatica of left side  Follow Up In Physical Therapy      2. Left lumbar radiculopathy  Follow Up In Physical Therapy          Subjective   General   Reason for Referral: Left lumbar radiculopathy  Follow Up In Physical Therapy      2. Sciatica of left side  Referral to Physical Therapy  Referred By: Renetta Barnett MD  Precautions:  Precautions  Precautions Comment: Low risk  Pain   Pain Assessment: 0-10  Pain Score: 2  Pain Location: Leg  Pain Orientation: Proximal, Posterior  Pain Descriptors: Sharp  Pain Frequency: Intermittent  Post Treatment Pain Level 1/10    Objective   Pt required only occasional cues for core contractions during there ex.    Treatments:  Therapeutic Exercise:  Therapeutic Exercise  Therapeutic Exercise Performed: Yes  Therapeutic Exercise Activity 1: Nustep L5, 7'  Therapeutic Exercise Activity 2: Seated hamstring stretch 20\"x3 L/R each  Therapeutic Exercise Activity 3: Isometric abdominal brace 2x10  Therapeutic Exercise Activity 4: Isometric abdominal brace with pelvic floor 2x10  Therapeutic Exercise Activity 5: Isometric abdominal brace with hip adduction  2x10  Therapeutic Exercise Activity 6: Isometric abdominal brace with hip abduction, PTB 2x10  Therapeutic Exercise Activity 7: Isometric abdominal brace with MIP 2x10 L/R each 2x10  Therapeutic Exercise Activity 8: Seated with lumbar support 3'  Therapeutic Exercise Activity 9: Discussed use and importance of core in protection of spine      Assessment "   Assessment:   PT Assessment  PT Assessment Results: Decreased strength, Decreased range of motion, Decreased endurance, Decreased mobility, Obesity, Pain  Rehab Prognosis: Good  Evaluation/Treatment Tolerance: Patient tolerated treatment well  Assessment Comment: Pt demonstrated fair core contol during ther ex progressions this visit.    Plan:   OP PT Plan  Treatment/Interventions: Dry needling, Gait training, Manual therapy, Mechanical traction, Neuromuscular re-education, Self care/ home management, Therapeutic activities, Therapeutic exercises  PT Plan: Skilled PT  PT Frequency: 1 time per week  Duration: 4 more visits  Rehab Potential: Good  Plan of Care Agreement: Patient    OP EDUCATION:  Outpatient Education  Individual(s) Educated: Patient  Education Provided: Body Mechanics, Home Exercise Program  Patient/Caregiver Demonstrated Understanding: yes  Patient Response to Education: Patient/Caregiver Verbalized Understanding of Information, Patient/Caregiver Performed Return Demonstration of Exercises/Activities, Patient/Caregiver Asked Appropriate Questions    Goals:        Active         Mobility         Goal 1         Start:  02/12/24    Expected End:  05/12/24        Pt will improve lumbar spine AROM to WNL to improve I/ADLs.           Goal 2         Start:  02/12/24    Expected End:  05/12/24        Pt will improve LE strength to 5/5 to improve I/ADLs              Pain         Goal 1         Start:  02/12/24    Expected End:  05/12/24        Pt will perform all pool playing activities with 0/10 pain           Goal 2         Start:  02/12/24    Expected End:  05/12/24        Pt will stand/walk >20 min with 0/10 pain

## 2024-02-29 ENCOUNTER — APPOINTMENT (OUTPATIENT)
Dept: CARDIOLOGY | Facility: CLINIC | Age: 74
End: 2024-02-29
Payer: MEDICARE

## 2024-03-01 ENCOUNTER — APPOINTMENT (OUTPATIENT)
Dept: PHYSICAL THERAPY | Facility: CLINIC | Age: 74
End: 2024-03-01
Payer: MEDICARE

## 2024-03-08 ENCOUNTER — APPOINTMENT (OUTPATIENT)
Dept: PHYSICAL THERAPY | Facility: CLINIC | Age: 74
End: 2024-03-08
Payer: MEDICARE

## 2024-03-22 ENCOUNTER — TREATMENT (OUTPATIENT)
Dept: PHYSICAL THERAPY | Facility: CLINIC | Age: 74
End: 2024-03-22
Payer: MEDICARE

## 2024-03-22 DIAGNOSIS — M54.16 LEFT LUMBAR RADICULOPATHY: ICD-10-CM

## 2024-03-22 DIAGNOSIS — M54.32 SCIATICA OF LEFT SIDE: ICD-10-CM

## 2024-03-22 PROCEDURE — 97110 THERAPEUTIC EXERCISES: CPT | Mod: GP,CQ

## 2024-03-22 ASSESSMENT — PAIN DESCRIPTION - DESCRIPTORS: DESCRIPTORS: SHARP

## 2024-03-22 ASSESSMENT — PAIN SCALES - GENERAL: PAINLEVEL_OUTOF10: 2

## 2024-03-22 NOTE — PROGRESS NOTES
"Physical Therapy Treatment    Patient Name: Gaurav Fischer  MRN: [id number]  Today's Date: 3/22/2024  Time Calculation  Start Time: 1102  Stop Time: 1145  Time Calculation (min): 43 min  PT Therapeutic Procedures Time Entry  Therapeutic Exercise Time Entry: 43    Insurance:  Visit number: 3 of 4  Authorization info: 2/12/2024 - 2/11/2025   Insurance Type: Payor: [employer] MEDICARE / Plan: [employer] MEDICARE ADVANTAGE / Product Type: *No Product type* /     Current Problem   1. Sciatica of left side  Follow Up In Physical Therapy      2. Left lumbar radiculopathy  Follow Up In Physical Therapy          Subjective   Pt reported initial increase in S/S with HEP performance, has improved since.    General   Reason for Referral: Left lumbar radiculopathy  Follow Up In Physical Therapy      2. Sciatica of left side  Referral to Physical Therapy  Referred By: Renetta Barnett MD  Precautions:  Precautions  Precautions Comment: Low risk  Pain   Pain Score: 2  Pain Location: Leg  Pain Orientation: Left, Posterior, Proximal  Pain Descriptors: Sharp  Pain Frequency: Intermittent  Post Treatment Pain Level 1/10    Objective   Non antalgic gait on arrival, occasional cues for core contraction during seated there ex today.    Treatments:  Therapeutic Exercise:  Therapeutic Exercise  Therapeutic Exercise Performed: Yes  Therapeutic Exercise Activity 1: Nustep L5, 7'  Therapeutic Exercise Activity 2: Seated hamstring stretch 20\"x3 L/R each  Therapeutic Exercise Activity 3: Isometric abdominal brace 2x10  Therapeutic Exercise Activity 4: Isometric abdominal brace with pelvic floor 2x10  Therapeutic Exercise Activity 5: Isometric abdominal brace with hip adduction  2x10  Therapeutic Exercise Activity 6: Isometric abdominal brace with hip abduction, PTB 2x10  Therapeutic Exercise Activity 7: Isometric abdominal brace with MIP 2x10 L/R each 2x10  Therapeutic Exercise Activity 8: Seated with lumbar support 3'  Therapeutic Exercise Activity " "9: DKTC with SB 5\" hold x20  Therapeutic Exercise Activity 10: Supine passive hamstring stretch 30\"x3  Therapeutic Exercise Activity 11: Piriformis stretch 20\"x3  Therapeutic Exercise Activity 12: Minisquats 2x5       Assessment   Assessment:   PT Assessment  PT Assessment Results: Decreased strength, Decreased range of motion, Decreased endurance, Decreased mobility, Obesity, Pain  Rehab Prognosis: Good  Evaluation/Treatment Tolerance: Patient tolerated treatment well  Assessment Comment: Pt demonstrates fair core ocntrol in sitting during ther ex, mild improvment in post L LE/glute irritation.    Plan:   OP PT Plan  Treatment/Interventions: Dry needling, Gait training, Manual therapy, Mechanical traction, Neuromuscular re-education, Self care/ home management, Therapeutic activities, Therapeutic exercises  PT Plan: Skilled PT  PT Frequency: 1 time per week  Duration: 4 more visits  Rehab Potential: Good  Plan of Care Agreement: Patient    OP EDUCATION:  Outpatient Education  Individual(s) Educated: Patient  Education Provided: Home Exercise Program, Body Mechanics  Patient/Caregiver Demonstrated Understanding: yes  Patient Response to Education: Patient/Caregiver Verbalized Understanding of Information, Patient/Caregiver Performed Return Demonstration of Exercises/Activities, Patient/Caregiver Asked Appropriate Questions    Goals:        Active         Mobility         Goal 1         Start:  02/12/24    Expected End:  05/12/24        Pt will improve lumbar spine AROM to WNL to improve I/ADLs.           Goal 2         Start:  02/12/24    Expected End:  05/12/24        Pt will improve LE strength to 5/5 to improve I/ADLs              Pain         Goal 1         Start:  02/12/24    Expected End:  05/12/24        Pt will perform all pool playing activities with 0/10 pain           Goal 2         Start:  02/12/24    Expected End:  05/12/24        Pt will stand/walk >20 min with 0/10 pain           "

## 2024-03-27 ENCOUNTER — APPOINTMENT (OUTPATIENT)
Dept: PHYSICAL THERAPY | Facility: CLINIC | Age: 74
End: 2024-03-27
Payer: MEDICARE

## 2024-04-03 DIAGNOSIS — E78.5 HYPERLIPIDEMIA, UNSPECIFIED HYPERLIPIDEMIA TYPE: ICD-10-CM

## 2024-04-04 RX ORDER — LOVASTATIN 40 MG/1
40 TABLET ORAL NIGHTLY
Qty: 90 TABLET | Refills: 2 | Status: SHIPPED | OUTPATIENT
Start: 2024-04-04

## 2024-04-10 ENCOUNTER — DOCUMENTATION (OUTPATIENT)
Dept: PHYSICAL THERAPY | Facility: CLINIC | Age: 74
End: 2024-04-10

## 2024-04-10 ENCOUNTER — APPOINTMENT (OUTPATIENT)
Dept: PHYSICAL THERAPY | Facility: CLINIC | Age: 74
End: 2024-04-10
Payer: MEDICARE

## 2024-04-10 NOTE — PROGRESS NOTES
"Physical Therapy    Discharge Summary    Name: Gaurav Fischer \"Sidney\"  MRN: [id number]  : 1950  Date: 04/10/24    Discharge Summary: PT    Discharge Information: Date of last visit 2024, Date of evaluation 2024, and Number of attended visits 2    Therapy Summary: Pt only attended one visit after initial eval.    Discharge Status: Return to MD as needed     Rehab Discharge Reason: Failed to schedule and/or keep follow-up appointment(s)  "

## 2024-05-03 ENCOUNTER — APPOINTMENT (OUTPATIENT)
Dept: UROLOGY | Facility: HOSPITAL | Age: 74
End: 2024-05-03
Payer: MEDICARE

## 2024-05-21 ENCOUNTER — OFFICE VISIT (OUTPATIENT)
Dept: UROLOGY | Facility: CLINIC | Age: 74
End: 2024-05-21
Payer: MEDICARE

## 2024-05-21 VITALS — TEMPERATURE: 97.4 F | BODY MASS INDEX: 31.04 KG/M2 | HEIGHT: 69 IN

## 2024-05-21 DIAGNOSIS — R35.0 URINARY FREQUENCY: ICD-10-CM

## 2024-05-21 DIAGNOSIS — R39.15 URINARY URGENCY: ICD-10-CM

## 2024-05-21 DIAGNOSIS — R35.0 BENIGN PROSTATIC HYPERPLASIA WITH URINARY FREQUENCY: Primary | ICD-10-CM

## 2024-05-21 DIAGNOSIS — N40.1 BENIGN PROSTATIC HYPERPLASIA WITH URINARY FREQUENCY: Primary | ICD-10-CM

## 2024-05-21 LAB
POC APPEARANCE, URINE: CLEAR
POC BILIRUBIN, URINE: NEGATIVE
POC BLOOD, URINE: NEGATIVE
POC COLOR, URINE: YELLOW
POC GLUCOSE, URINE: NEGATIVE MG/DL
POC KETONES, URINE: NEGATIVE MG/DL
POC LEUKOCYTES, URINE: NEGATIVE
POC NITRITE,URINE: NEGATIVE
POC PH, URINE: 7 PH
POC PROTEIN, URINE: NEGATIVE MG/DL
POC SPECIFIC GRAVITY, URINE: 1.01
POC UROBILINOGEN, URINE: 0.2 EU/DL

## 2024-05-21 PROCEDURE — 1160F RVW MEDS BY RX/DR IN RCRD: CPT | Performed by: UROLOGY

## 2024-05-21 PROCEDURE — 1126F AMNT PAIN NOTED NONE PRSNT: CPT | Performed by: UROLOGY

## 2024-05-21 PROCEDURE — 1159F MED LIST DOCD IN RCRD: CPT | Performed by: UROLOGY

## 2024-05-21 PROCEDURE — 51798 US URINE CAPACITY MEASURE: CPT | Performed by: UROLOGY

## 2024-05-21 PROCEDURE — 51741 ELECTRO-UROFLOWMETRY FIRST: CPT | Performed by: UROLOGY

## 2024-05-21 PROCEDURE — 1036F TOBACCO NON-USER: CPT | Performed by: UROLOGY

## 2024-05-21 PROCEDURE — 1123F ACP DISCUSS/DSCN MKR DOCD: CPT | Performed by: UROLOGY

## 2024-05-21 PROCEDURE — 3008F BODY MASS INDEX DOCD: CPT | Performed by: UROLOGY

## 2024-05-21 PROCEDURE — 99214 OFFICE O/P EST MOD 30 MIN: CPT | Performed by: UROLOGY

## 2024-05-21 PROCEDURE — 81002 URINALYSIS NONAUTO W/O SCOPE: CPT | Performed by: UROLOGY

## 2024-05-21 RX ORDER — TADALAFIL 10 MG/1
10 TABLET ORAL AS NEEDED
Qty: 30 TABLET | Refills: 11 | Status: SHIPPED | OUTPATIENT
Start: 2024-05-21 | End: 2025-05-21

## 2024-05-21 ASSESSMENT — PAIN SCALES - GENERAL: PAINLEVEL: 0-NO PAIN

## 2024-05-21 NOTE — PROGRESS NOTES
Subjective   Gaurav Fischer is a 73 y.o. male presenting as a new patient today, kindly referred by Dr. Barnett due to LUTS. Patient has complaints of worsening urinary frequency and urgency. Patient has no obstructive urinary symptoms.     He has history of DEANGELO,  he does wear his mask at night. He is able to sleep through the night. He has significant urinary urgency, every 2 hours during the day and notes this interferes with his social life. He has tried Tamsulosin in the past and daily Tadalafil with no improvement in his urinary symptoms. Patient also has complaints of erectile dysfunction, his libido is normal. He has tried Cialis 5mg with minimal response. Denies any recent gross hematuria, fevers, chills, urinary retention, intractable flank or abdominal pain, nausea or vomiting.            Past Medical History:   Diagnosis Date    Dependence on other enabling machines and devices     History of continuous positive airway pressure (CPAP) therapy at home    Encounter for general adult medical examination without abnormal findings 07/12/2019    Welcome to Medicare preventive visit    Hip pain, acute, right     Low back pain     Personal history of other endocrine, nutritional and metabolic disease 03/03/2015    History of type 2 multiple endocrine neoplasia (MEN)     Past Surgical History:   Procedure Laterality Date    APPENDECTOMY  03/03/2015    Appendectomy    COLONOSCOPY  03/03/2015    Complete Colonoscopy    HERNIA REPAIR  03/03/2015    Hernia Repair    OTHER SURGICAL HISTORY  02/17/2017    Laminectomy Decompressive Up To Two Lumbar Segments    OTHER SURGICAL HISTORY  04/08/2021    Back surgery    SINUS SURGERY  03/03/2015    Sinus Surgery    TONSILLECTOMY  03/03/2015    Tonsillectomy    TOTAL THYROIDECTOMY  01/05/2022    Thyroid Surgery Total Thyroidectomy     Family History   Problem Relation Name Age of Onset    Breast cancer Mother      Other (mesothelioma) Father      Breast cancer Sister       Diabetes type II Sister       Current Outpatient Medications   Medication Sig Dispense Refill    amLODIPine (Norvasc) 5 mg tablet Take 1 tablet (5 mg) by mouth once daily. 90 tablet 2    aspirin 81 mg EC tablet Take 1 tablet (81 mg) by mouth once daily.      cetirizine (ZyrTEC) 10 mg tablet Take 1 tablet (10 mg) by mouth if needed.      desoximetasone (Topicort) 0.25 % cream Desoximetasone 0.25 % External Cream APPLY AND GENTLY MASSAGE INTO AFFECTED AREA(S) TWICE DAILY. as needed Refills: 0 Start : 26-Oct-2016 Active      doxycycline (Vibra-Tabs) 100 mg tablet Take by mouth every 12 hours.      famotidine (Pepcid) 20 mg tablet Take 1 tablet (20 mg) by mouth once daily.      hydrocortisone 1 % ointment Apply topically if needed.      levothyroxine (Synthroid, Levoxyl) 112 mcg tablet Take 1 tablet (112 mcg) by mouth once daily in the morning. Take before meals. 102 tablet 2    lovastatin (Mevacor) 40 mg tablet Take 1 tablet (40 mg) by mouth once daily at bedtime. 90 tablet 2    MELATONIN ORAL Take 1 mg by mouth. Take as directed      neomycin-polymyxin B-dexameth (Polydex) 3.5 mg/g-10,000 unit/g-0.1 % ointment ophthalmic ointment Apply to affected eye(s).      neomycin-polymyxin-pramoxine (Neosporin) 3.5-10,000-10 mg-unit-mg/gram cream Apply topically.      rosuvastatin (Crestor) 20 mg tablet Take by mouth once daily.      sildenafil (Revatio) 20 mg tablet Take by mouth.      tadalafil (Cialis) 5 mg tablet Take 1 tablet (5 mg) by mouth once daily.      tamsulosin (Flomax) 0.4 mg 24 hr capsule Take 1 capsule (0.4 mg) by mouth once daily.      triamcinolone (Nasacort) 55 mcg nasal inhaler Administer into affected nostril(s).       No current facility-administered medications for this visit.     Allergies   Allergen Reactions    Sulfamethoxazole-Trimethoprim Anaphylaxis     patient states throat swells    Griseofulvin Itching     antifungals    Pollen Extracts Other     Social History     Socioeconomic History    Marital  "status:      Spouse name: Not on file    Number of children: Not on file    Years of education: Not on file    Highest education level: Not on file   Occupational History    Not on file   Tobacco Use    Smoking status: Former     Types: Cigars    Smokeless tobacco: Never   Vaping Use    Vaping status: Never Used   Substance and Sexual Activity    Alcohol use: Yes     Alcohol/week: 3.0 standard drinks of alcohol     Types: 3 Cans of beer per week    Drug use: Never    Sexual activity: Defer   Other Topics Concern    Not on file   Social History Narrative    Not on file     Social Determinants of Health     Financial Resource Strain: Not on file   Food Insecurity: Not on file   Transportation Needs: Not on file   Physical Activity: Not on file   Stress: Not on file   Social Connections: Not on file   Intimate Partner Violence: Not on file   Housing Stability: Not on file       Review of Systems  Pertinent items are noted in HPI.    Objective       Lab Review  Lab Results   Component Value Date    WBC 5.7 01/19/2024    RBC 4.61 01/19/2024    HGB 12.6 (L) 01/19/2024    HCT 39.9 (L) 01/19/2024     01/19/2024      Lab Results   Component Value Date    BUN 24 (H) 01/19/2024    CREATININE 1.17 01/19/2024       PVR=9ml   Uroflow study demonstrated voided volume of 111 and maximal flow rate of  5.6ml/s, interrupted flow.     No results found for: \"PSA\"  Urine analysis shows negative   Assessment/Plan   Diagnoses and all orders for this visit:  Benign prostatic hyperplasia with urinary frequency  -     Referral to Urology  -     POCT UA Automated manually resulted  -     Measure post void residual  -     Creatinine; Future  -     Blood Urea Nitrogen; Future  -     MR prostate with alexis boundaries; Future  Urinary frequency  -     Referral to Urology  -     POCT UA (nonautomated) manually resulted  Urinary urgency  -     Referral to Urology    BPH with LUTS - urinary frequency and urgency    We will proceed with " prostate MRI to assess prostate anatomy and size in anticipation of fusion biopsy if indicated.    Patient is interested in an outlet procedure with concurrent Botox injections.     We will follow up virtually to review.     2. ED     We will start patient on Cialis 10mg, advised could increase to 20mg as needed.    The patient has been diagnosed with ED and cardiac assessment according to the [place] criteria allows use of oral PDE-5 inhibitor. The patient understands that these medications are not initiators of erection and that he will still require sexual stimulation. If prescribed vardenafil or sildenafil, he was advised to take the medication 30-60 minutes prior to sexual activity and that the efficacy of the medication is decreased following a high-fat meal.     The patient verbalized understanding that nitrates such as nitroglycerine, isosorbide mononitrate, isosorbide dinitrate and any other nitrate preparations are absolutely contradicted with the use of a PDE-5 inhibitor. The patient was advised to alert any medical personal of PDE- 5 inhibitor use should he seek urgent medical attention for any reason.     I explained the common adverse effects of therapy including but not limited to headache, flushing, dizziness, rash, upset stomach, diarrhea, nasal congestion, abnormal vision, back pain, and myalgia. Less common side effects are lightheadedness or blood pressure drop upon standing. We discussed that patients have  after using medications in this class, and sometimes the exact cause of death was not able to be determined. There is a very small risk of nonarteritic ischemic optic neuropathy, a rare cause of blindness that may be permanent while using medications in this class. Patient is instructed to immediately stop this medication and seek medical attention if he develops visual disturbances in one or both eyes.     We discussed that if he experiences erection lasting longer than four hours, he  needs to seek immediate treatment at the ER as the longer he waits, the more damage is done to the penile tissue. The patient states that he is not withholding any information about his condition or the use of medications in order to receive a PDE5 inhibitor class medication. No barriers to learning were identified. After all of the patients' questions were satisfactorily answered, he expressed understanding the risks of therapy and wishes to proceed.         All questions were answered to the patient's satisfaction. Patient agrees with the plan and wishes to proceed. Follow-up will be scheduled appropriately.     E&M visit today is associated with current or anticipated ongoing medical care services related to a patient's single, serious condition or a complex condition.    Scribed for Dr. Tejada by Nolvia Arenas. I , Dr Tejada, have personally reviewed and agreed with the information entered by the Virtual Scribe.

## 2024-05-24 ENCOUNTER — LAB (OUTPATIENT)
Dept: LAB | Facility: LAB | Age: 74
End: 2024-05-24
Payer: MEDICARE

## 2024-05-24 ENCOUNTER — APPOINTMENT (OUTPATIENT)
Dept: PRIMARY CARE | Facility: CLINIC | Age: 74
End: 2024-05-24
Payer: MEDICARE

## 2024-05-24 DIAGNOSIS — N40.1 BENIGN PROSTATIC HYPERPLASIA WITH URINARY FREQUENCY: ICD-10-CM

## 2024-05-24 DIAGNOSIS — G47.33 OBSTRUCTIVE SLEEP APNEA OF ADULT: ICD-10-CM

## 2024-05-24 DIAGNOSIS — I10 PRIMARY HYPERTENSION: Primary | ICD-10-CM

## 2024-05-24 DIAGNOSIS — E03.9 ACQUIRED HYPOTHYROIDISM: ICD-10-CM

## 2024-05-24 DIAGNOSIS — D64.9 ANEMIA, UNSPECIFIED TYPE: ICD-10-CM

## 2024-05-24 DIAGNOSIS — C73 MEDULLARY THYROID CARCINOMA (MULTI): ICD-10-CM

## 2024-05-24 DIAGNOSIS — E88.01 ALPHA-1-ANTITRYPSIN DEFICIENCY (MULTI): ICD-10-CM

## 2024-05-24 DIAGNOSIS — K21.9 GASTROESOPHAGEAL REFLUX DISEASE WITHOUT ESOPHAGITIS: ICD-10-CM

## 2024-05-24 DIAGNOSIS — E78.49 OTHER HYPERLIPIDEMIA: ICD-10-CM

## 2024-05-24 DIAGNOSIS — R35.0 BENIGN PROSTATIC HYPERPLASIA WITH URINARY FREQUENCY: ICD-10-CM

## 2024-05-24 LAB
BASOPHILS # BLD AUTO: 0.02 X10*3/UL (ref 0–0.1)
BASOPHILS NFR BLD AUTO: 0.4 %
BUN SERPL-MCNC: 24 MG/DL (ref 6–23)
CREAT SERPL-MCNC: 1.23 MG/DL (ref 0.5–1.3)
EGFRCR SERPLBLD CKD-EPI 2021: 62 ML/MIN/1.73M*2
EOSINOPHIL # BLD AUTO: 0.2 X10*3/UL (ref 0–0.4)
EOSINOPHIL NFR BLD AUTO: 4 %
ERYTHROCYTE [DISTWIDTH] IN BLOOD BY AUTOMATED COUNT: 13.4 % (ref 11.5–14.5)
FERRITIN SERPL-MCNC: 21 NG/ML (ref 20–300)
HCT VFR BLD AUTO: 39.4 % (ref 41–52)
HGB BLD-MCNC: 12.4 G/DL (ref 13.5–17.5)
IMM GRANULOCYTES # BLD AUTO: 0.01 X10*3/UL (ref 0–0.5)
IMM GRANULOCYTES NFR BLD AUTO: 0.2 % (ref 0–0.9)
IRON SATN MFR SERPL: 12 % (ref 25–45)
IRON SERPL-MCNC: 52 UG/DL (ref 35–150)
LYMPHOCYTES # BLD AUTO: 1.28 X10*3/UL (ref 0.8–3)
LYMPHOCYTES NFR BLD AUTO: 25.7 %
MCH RBC QN AUTO: 26.8 PG (ref 26–34)
MCHC RBC AUTO-ENTMCNC: 31.5 G/DL (ref 32–36)
MCV RBC AUTO: 85 FL (ref 80–100)
MONOCYTES # BLD AUTO: 0.52 X10*3/UL (ref 0.05–0.8)
MONOCYTES NFR BLD AUTO: 10.4 %
NEUTROPHILS # BLD AUTO: 2.96 X10*3/UL (ref 1.6–5.5)
NEUTROPHILS NFR BLD AUTO: 59.3 %
NRBC BLD-RTO: 0 /100 WBCS (ref 0–0)
PLATELET # BLD AUTO: 249 X10*3/UL (ref 150–450)
RBC # BLD AUTO: 4.62 X10*6/UL (ref 4.5–5.9)
TIBC SERPL-MCNC: 438 UG/DL (ref 240–445)
UIBC SERPL-MCNC: 386 UG/DL (ref 110–370)
WBC # BLD AUTO: 5 X10*3/UL (ref 4.4–11.3)

## 2024-05-24 PROCEDURE — 82565 ASSAY OF CREATININE: CPT

## 2024-05-24 PROCEDURE — 84520 ASSAY OF UREA NITROGEN: CPT

## 2024-05-24 PROCEDURE — 85025 COMPLETE CBC W/AUTO DIFF WBC: CPT

## 2024-05-24 PROCEDURE — 83550 IRON BINDING TEST: CPT

## 2024-05-24 PROCEDURE — 36415 COLL VENOUS BLD VENIPUNCTURE: CPT

## 2024-05-24 PROCEDURE — 83540 ASSAY OF IRON: CPT

## 2024-05-24 PROCEDURE — 82728 ASSAY OF FERRITIN: CPT

## 2024-05-30 ENCOUNTER — TELEPHONE (OUTPATIENT)
Dept: PRIMARY CARE | Facility: CLINIC | Age: 74
End: 2024-05-30
Payer: MEDICARE

## 2024-05-30 NOTE — TELEPHONE ENCOUNTER
Spoke with patient and advised of message   Pt. Is also asking if you could review his blood tests. He never got the results.

## 2024-05-30 NOTE — TELEPHONE ENCOUNTER
Pt. States saw his blood work. Asking if any of this would show why he is having back pain. Also wants to know if the scan he will be having for his prostate would show why he is having back pain. States still is having back pain.

## 2024-05-30 NOTE — TELEPHONE ENCOUNTER
Spoke with patient and advised of message   Pt. States that his back pain has flared up in the last couple of weeks. States hurts when he wakes up in the morning, takes Motrin, then it is better for the rest of the day. Asking if there is something you would recommend for him to do.

## 2024-06-05 ENCOUNTER — HOSPITAL ENCOUNTER (OUTPATIENT)
Dept: RADIOLOGY | Facility: HOSPITAL | Age: 74
Discharge: HOME | End: 2024-06-05
Payer: MEDICARE

## 2024-06-05 DIAGNOSIS — R35.0 BENIGN PROSTATIC HYPERPLASIA WITH URINARY FREQUENCY: ICD-10-CM

## 2024-06-05 DIAGNOSIS — N40.1 BENIGN PROSTATIC HYPERPLASIA WITH URINARY FREQUENCY: ICD-10-CM

## 2024-06-05 PROCEDURE — A9575 INJ GADOTERATE MEGLUMI 0.1ML: HCPCS | Performed by: UROLOGY

## 2024-06-05 PROCEDURE — 72197 MRI PELVIS W/O & W/DYE: CPT

## 2024-06-05 PROCEDURE — 72197 MRI PELVIS W/O & W/DYE: CPT | Performed by: RADIOLOGY

## 2024-06-05 PROCEDURE — 2550000001 HC RX 255 CONTRASTS: Performed by: UROLOGY

## 2024-06-05 RX ORDER — GADOTERATE MEGLUMINE 376.9 MG/ML
20 INJECTION INTRAVENOUS
Status: COMPLETED | OUTPATIENT
Start: 2024-06-05 | End: 2024-06-05

## 2024-06-05 RX ADMIN — GADOTERATE MEGLUMINE 19 ML: 376.9 INJECTION INTRAVENOUS at 18:38

## 2024-06-07 DIAGNOSIS — E03.9 HYPOTHYROIDISM, UNSPECIFIED: ICD-10-CM

## 2024-06-07 DIAGNOSIS — E03.9 ACQUIRED HYPOTHYROIDISM: ICD-10-CM

## 2024-06-11 RX ORDER — LEVOTHYROXINE SODIUM 112 UG/1
112 TABLET ORAL EVERY MORNING
Qty: 90 TABLET | Refills: 1 | Status: SHIPPED | OUTPATIENT
Start: 2024-06-11

## 2024-06-19 ENCOUNTER — APPOINTMENT (OUTPATIENT)
Dept: UROLOGY | Facility: CLINIC | Age: 74
End: 2024-06-19
Payer: MEDICARE

## 2024-06-19 DIAGNOSIS — Z79.2 NEED FOR PROPHYLACTIC ANTIBIOTIC: ICD-10-CM

## 2024-06-19 DIAGNOSIS — N40.1 BENIGN PROSTATIC HYPERPLASIA WITH URINARY FREQUENCY: Primary | ICD-10-CM

## 2024-06-19 DIAGNOSIS — R35.0 BENIGN PROSTATIC HYPERPLASIA WITH URINARY FREQUENCY: Primary | ICD-10-CM

## 2024-06-19 PROCEDURE — G2211 COMPLEX E/M VISIT ADD ON: HCPCS | Performed by: UROLOGY

## 2024-06-19 PROCEDURE — 1123F ACP DISCUSS/DSCN MKR DOCD: CPT | Performed by: UROLOGY

## 2024-06-19 PROCEDURE — 3008F BODY MASS INDEX DOCD: CPT | Performed by: UROLOGY

## 2024-06-19 PROCEDURE — 99214 OFFICE O/P EST MOD 30 MIN: CPT | Performed by: UROLOGY

## 2024-06-19 RX ORDER — CEPHALEXIN 500 MG/1
500 CAPSULE ORAL ONCE
Qty: 1 CAPSULE | Refills: 0 | Status: SHIPPED | OUTPATIENT
Start: 2024-06-19 | End: 2024-06-19

## 2024-06-19 NOTE — PROGRESS NOTES
Subjective     This visit was completed via telemedicine. All issues as below were discussed and addressed but no physical exam was performed unless allowed by visual confirmation. If it was felt that the patient should be evaluated in clinic, then they were directed there. Patient verbally consented to visit.      Gaurav Fischer is a 74 y.o. male with BPH with LUTS and ED, presenting today to review prostate MRI in anticipation of an outlet procedure.         LAST VISIT (5/21/2024):  Gaurav Fischer is a 73 y.o. male presenting as a new patient today, kindly referred by Dr. Barnett due to LUTS. Patient has complaints of worsening urinary frequency and urgency. Patient has no obstructive urinary symptoms.      He has history of DEANGELO,  he does wear his mask at night. He is able to sleep through the night. He has significant urinary urgency, every 2 hours during the day and notes this interferes with his social life. He has tried Tamsulosin in the past and daily Tadalafil with no improvement in his urinary symptoms. Patient also has complaints of erectile dysfunction, his libido is normal. He has tried Cialis 5mg with minimal response. Denies any recent gross hematuria, fevers, chills, urinary retention, intractable flank or abdominal pain, nausea or vomiting.       Past Medical History:   Diagnosis Date    Dependence on other enabling machines and devices     History of continuous positive airway pressure (CPAP) therapy at home    Encounter for general adult medical examination without abnormal findings 07/12/2019    Welcome to Medicare preventive visit    Hip pain, acute, right     Low back pain     Personal history of other endocrine, nutritional and metabolic disease 03/03/2015    History of type 2 multiple endocrine neoplasia (MEN)     Past Surgical History:   Procedure Laterality Date    APPENDECTOMY  03/03/2015    Appendectomy    COLONOSCOPY  03/03/2015    Complete Colonoscopy    HERNIA REPAIR  03/03/2015     Hernia Repair    OTHER SURGICAL HISTORY  02/17/2017    Laminectomy Decompressive Up To Two Lumbar Segments    OTHER SURGICAL HISTORY  04/08/2021    Back surgery    SINUS SURGERY  03/03/2015    Sinus Surgery    TONSILLECTOMY  03/03/2015    Tonsillectomy    TOTAL THYROIDECTOMY  01/05/2022    Thyroid Surgery Total Thyroidectomy     Family History   Problem Relation Name Age of Onset    Breast cancer Mother      Other (mesothelioma) Father      Breast cancer Sister      Diabetes type II Sister       Current Outpatient Medications   Medication Sig Dispense Refill    amLODIPine (Norvasc) 5 mg tablet Take 1 tablet (5 mg) by mouth once daily. 90 tablet 2    aspirin 81 mg EC tablet Take 1 tablet (81 mg) by mouth once daily.      cephalexin (Keflex) 500 mg capsule Take 1 capsule (500 mg) by mouth 1 time for 1 dose. Take 1 capsule by mouth 1 hour prior to cystoscopy 1 capsule 0    cetirizine (ZyrTEC) 10 mg tablet Take 1 tablet (10 mg) by mouth if needed.      desoximetasone (Topicort) 0.25 % cream Desoximetasone 0.25 % External Cream APPLY AND GENTLY MASSAGE INTO AFFECTED AREA(S) TWICE DAILY. as needed Refills: 0 Start : 26-Oct-2016 Active      doxycycline (Vibra-Tabs) 100 mg tablet Take by mouth every 12 hours.      famotidine (Pepcid) 20 mg tablet Take 1 tablet (20 mg) by mouth once daily.      hydrocortisone 1 % ointment Apply topically if needed.      levothyroxine (Synthroid, Levoxyl) 112 mcg tablet Take 1 tablet (112 mcg) by mouth once daily in the morning. Take on empty stomach 90 tablet 1    lovastatin (Mevacor) 40 mg tablet Take 1 tablet (40 mg) by mouth once daily at bedtime. 90 tablet 2    MELATONIN ORAL Take 1 mg by mouth. Take as directed      neomycin-polymyxin B-dexameth (Polydex) 3.5 mg/g-10,000 unit/g-0.1 % ointment ophthalmic ointment Apply to affected eye(s).      neomycin-polymyxin-pramoxine (Neosporin) 3.5-10,000-10 mg-unit-mg/gram cream Apply topically.      rosuvastatin (Crestor) 20 mg tablet Take by  mouth once daily.      sildenafil (Revatio) 20 mg tablet Take by mouth.      tadalafil (Cialis) 10 mg tablet Take 1 tablet (10 mg) by mouth if needed for erectile dysfunction. 30 tablet 11    tadalafil (Cialis) 5 mg tablet Take 1 tablet (5 mg) by mouth once daily.      tamsulosin (Flomax) 0.4 mg 24 hr capsule Take 1 capsule (0.4 mg) by mouth once daily.      triamcinolone (Nasacort) 55 mcg nasal inhaler Administer into affected nostril(s).       No current facility-administered medications for this visit.     Allergies   Allergen Reactions    Sulfamethoxazole-Trimethoprim Anaphylaxis     patient states throat swells    Griseofulvin Itching     antifungals    Pollen Extracts Other     Social History     Socioeconomic History    Marital status:      Spouse name: Not on file    Number of children: Not on file    Years of education: Not on file    Highest education level: Not on file   Occupational History    Not on file   Tobacco Use    Smoking status: Former     Types: Cigars    Smokeless tobacco: Never   Vaping Use    Vaping status: Never Used   Substance and Sexual Activity    Alcohol use: Yes     Alcohol/week: 3.0 standard drinks of alcohol     Types: 3 Cans of beer per week    Drug use: Never    Sexual activity: Defer   Other Topics Concern    Not on file   Social History Narrative    Not on file     Social Determinants of Health     Financial Resource Strain: Not on file   Food Insecurity: Not on file   Transportation Needs: Not on file   Physical Activity: Not on file   Stress: Not on file   Social Connections: Not on file   Intimate Partner Violence: Not on file   Housing Stability: Not on file       Review of Systems  Pertinent items are noted in HPI.    Objective       Lab Review  Lab Results   Component Value Date    WBC 5.0 05/24/2024    RBC 4.62 05/24/2024    HGB 12.4 (L) 05/24/2024    HCT 39.4 (L) 05/24/2024     05/24/2024      Lab Results   Component Value Date    BUN 24 (H) 05/24/2024     "CREATININE 1.23 05/24/2024      No results found for: \"PSA\"      Assessment/Plan   Diagnoses and all orders for this visit:  Benign prostatic hyperplasia with urinary frequency  -     Cystourethroscopy; Future  Need for prophylactic antibiotic  -     cephalexin (Keflex) 500 mg capsule; Take 1 capsule (500 mg) by mouth 1 time for 1 dose. Take 1 capsule by mouth 1 hour prior to cystoscopy    BPH with lUTS     I reviewed prostate MRI from 6/5/2024 which showed a 81.9g prostate with no sign of cancer.     We will proceed with cystoscopy in anticipation of an outlet procedure.     The risks of cystoscopy were discussed with the patient in great detail, including risk of hematuria, UTI and discomfort. Antibiotic will be prescribed to be taken 1 hour prior to the procedure. Patient agrees to proceed.     All questions were answered to the patient's satisfaction. Patient agrees with the plan and wishes to proceed. Follow-up will be scheduled appropriately.     E&M visit today is associated with current or anticipated ongoing medical care services related to a patient's single, serious condition or a complex condition.    Scribed for Dr. Tejada by Nolvia Arenas. I , Dr Tejada, have personally reviewed and agreed with the information entered by the Virtual Scribe.   "

## 2024-07-09 NOTE — PROGRESS NOTES
Subjective   Gaurav Fischer is a 74 y.o. male with BPH with LUTS and ED, presenting today for cystoscopy in anticipation of an outlet procedure.       Visit from 5/21/2024:  Gaurav Fischer is a 73 y.o. male presenting as a new patient today, kindly referred by Dr. Barnett due to LUTS. Patient has complaints of worsening urinary frequency and urgency. Patient has no obstructive urinary symptoms.      He has history of DEANGELO,  he does wear his mask at night. He is able to sleep through the night. He has significant urinary urgency, every 2 hours during the day and notes this interferes with his social life. He has tried Tamsulosin in the past and daily Tadalafil with no improvement in his urinary symptoms. Patient also has complaints of erectile dysfunction, his libido is normal. He has tried Cialis 5mg with minimal response. Denies any recent gross hematuria, fevers, chills, urinary retention, intractable flank or abdominal pain, nausea or vomiting.    Past Medical History:   Diagnosis Date    Dependence on other enabling machines and devices     History of continuous positive airway pressure (CPAP) therapy at home    Encounter for general adult medical examination without abnormal findings 07/12/2019    Welcome to Medicare preventive visit    Hip pain, acute, right     Low back pain     Personal history of other endocrine, nutritional and metabolic disease 03/03/2015    History of type 2 multiple endocrine neoplasia (MEN)     Past Surgical History:   Procedure Laterality Date    APPENDECTOMY  03/03/2015    Appendectomy    COLONOSCOPY  03/03/2015    Complete Colonoscopy    HERNIA REPAIR  03/03/2015    Hernia Repair    OTHER SURGICAL HISTORY  02/17/2017    Laminectomy Decompressive Up To Two Lumbar Segments    OTHER SURGICAL HISTORY  04/08/2021    Back surgery    SINUS SURGERY  03/03/2015    Sinus Surgery    TONSILLECTOMY  03/03/2015    Tonsillectomy    TOTAL THYROIDECTOMY  01/05/2022    Thyroid Surgery Total  Thyroidectomy     Family History   Problem Relation Name Age of Onset    Breast cancer Mother      Other (mesothelioma) Father      Breast cancer Sister      Diabetes type II Sister       Current Outpatient Medications   Medication Sig Dispense Refill    amLODIPine (Norvasc) 5 mg tablet Take 1 tablet (5 mg) by mouth once daily. 90 tablet 2    aspirin 81 mg EC tablet Take 1 tablet (81 mg) by mouth once daily.      cetirizine (ZyrTEC) 10 mg tablet Take 1 tablet (10 mg) by mouth if needed.      desoximetasone (Topicort) 0.25 % cream Desoximetasone 0.25 % External Cream APPLY AND GENTLY MASSAGE INTO AFFECTED AREA(S) TWICE DAILY. as needed Refills: 0 Start : 26-Oct-2016 Active      doxycycline (Vibra-Tabs) 100 mg tablet Take by mouth every 12 hours.      famotidine (Pepcid) 20 mg tablet Take 1 tablet (20 mg) by mouth once daily.      hydrocortisone 1 % ointment Apply topically if needed.      levothyroxine (Synthroid, Levoxyl) 112 mcg tablet Take 1 tablet (112 mcg) by mouth once daily in the morning. Take on empty stomach 90 tablet 1    lovastatin (Mevacor) 40 mg tablet Take 1 tablet (40 mg) by mouth once daily at bedtime. 90 tablet 2    MELATONIN ORAL Take 1 mg by mouth. Take as directed      neomycin-polymyxin B-dexameth (Polydex) 3.5 mg/g-10,000 unit/g-0.1 % ointment ophthalmic ointment Apply to affected eye(s).      neomycin-polymyxin-pramoxine (Neosporin) 3.5-10,000-10 mg-unit-mg/gram cream Apply topically.      rosuvastatin (Crestor) 20 mg tablet Take by mouth once daily.      sildenafil (Revatio) 20 mg tablet Take by mouth.      tadalafil (Cialis) 10 mg tablet Take 1 tablet (10 mg) by mouth if needed for erectile dysfunction. 30 tablet 11    tadalafil (Cialis) 5 mg tablet Take 1 tablet (5 mg) by mouth once daily.      tamsulosin (Flomax) 0.4 mg 24 hr capsule Take 1 capsule (0.4 mg) by mouth once daily.      triamcinolone (Nasacort) 55 mcg nasal inhaler Administer into affected nostril(s).       No current  facility-administered medications for this visit.     Allergies   Allergen Reactions    Sulfamethoxazole-Trimethoprim Anaphylaxis     patient states throat swells    Griseofulvin Itching     antifungals    Pollen Extracts Other     Social History     Socioeconomic History    Marital status:      Spouse name: Not on file    Number of children: Not on file    Years of education: Not on file    Highest education level: Not on file   Occupational History    Not on file   Tobacco Use    Smoking status: Former     Types: Cigars    Smokeless tobacco: Never   Vaping Use    Vaping status: Never Used   Substance and Sexual Activity    Alcohol use: Yes     Alcohol/week: 3.0 standard drinks of alcohol     Types: 3 Cans of beer per week    Drug use: Never    Sexual activity: Defer   Other Topics Concern    Not on file   Social History Narrative    Not on file     Social Determinants of Health     Financial Resource Strain: Not on file   Food Insecurity: Not on file   Transportation Needs: Not on file   Physical Activity: Not on file   Stress: Not on file   Social Connections: Not on file   Intimate Partner Violence: Not on file   Housing Stability: Not on file       Review of Systems  Pertinent items are noted in HPI.    Objective   Cystoscopy performed:   Gaurav Fischer identified using two (2) forms of identification.  Procedure: diagnostic cystourethroscopy  Indications for procedure: BPH  Risks, benefits, and alternatives were discussed in detail.   Patient appears to understand and agrees to proceed.   Patient has signed the procedure consent form.     Cystoscopy findings:  Urethra: normal course and caliber, no evidence of stricture or lesion.  Bladder: trabeculated bladder with diverticulii, stone, tumors or other lesions.  Post-cystoscopy: Patient tolerated procedure without complications.    [x] Lateral hypertrophy, with complete channel occlusion.  [x] Obstructing median lobe with intravesical  protrusion.  [x] Good sphincter coaptation.        PLAN:    BPH with LUTS     Given the large volume of the prostate, I recommended proceeding with HoLEP. I discussed that a laser will be used to shell out the obstructing tissue from the inside of the prostate gland. I discussed in detail the risks associated with the HoLEP procedure. As with any surgical procedure, HoLEP surgery has some risks. Such as, incontinence of urine which is common for a few months after surgery but is rarely permanent (about one percent of cases), bleeding after surgery, the need for transfusion or another operation due to bleeding, UTI, damage to the bladder, damage to the ureteral orifice (a small tube where kidney drains into the bladder), prolonged need for a catheter after surgery, or scar tissue in the area of surgery or urethra. Retrograde ejaculation was discussed as a permanent irreversible side effect.          All questions were answered to the patient's satisfaction. Patient agrees with the plan and wishes to proceed. Follow-up will be scheduled appropriately.     I spent 40 minutes of dedicated E&M time, including preparation and review of records, notes, and data, time spent with patient/family, and documentation.     Scribe Attestation  By signing my name below, I, Gabrielle Rm,   attest that this documentation has been prepared under the direction and in the presence of Faby Tejada MD.

## 2024-07-10 ENCOUNTER — APPOINTMENT (OUTPATIENT)
Dept: UROLOGY | Facility: CLINIC | Age: 74
End: 2024-07-10
Payer: MEDICARE

## 2024-07-10 VITALS
WEIGHT: 213 LBS | BODY MASS INDEX: 31.55 KG/M2 | HEART RATE: 65 BPM | TEMPERATURE: 97.8 F | SYSTOLIC BLOOD PRESSURE: 135 MMHG | DIASTOLIC BLOOD PRESSURE: 83 MMHG | HEIGHT: 69 IN

## 2024-07-10 DIAGNOSIS — R35.0 BENIGN PROSTATIC HYPERPLASIA WITH URINARY FREQUENCY: Primary | ICD-10-CM

## 2024-07-10 DIAGNOSIS — N40.1 BENIGN PROSTATIC HYPERPLASIA WITH URINARY FREQUENCY: Primary | ICD-10-CM

## 2024-07-10 DIAGNOSIS — N33 BLADDER DISORDERS IN DISEASES CLASSIFIED ELSEWHERE: ICD-10-CM

## 2024-07-10 DIAGNOSIS — Z01.818 PREOP TESTING: ICD-10-CM

## 2024-07-10 DIAGNOSIS — R35.0 URINARY FREQUENCY: ICD-10-CM

## 2024-07-10 LAB
POC APPEARANCE, URINE: CLEAR
POC BILIRUBIN, URINE: NEGATIVE
POC BLOOD, URINE: NEGATIVE
POC COLOR, URINE: YELLOW
POC GLUCOSE, URINE: NEGATIVE MG/DL
POC KETONES, URINE: NEGATIVE MG/DL
POC LEUKOCYTES, URINE: NEGATIVE
POC NITRITE,URINE: NEGATIVE
POC PH, URINE: 6 PH
POC PROTEIN, URINE: NEGATIVE MG/DL
POC SPECIFIC GRAVITY, URINE: 1.01
POC UROBILINOGEN, URINE: 0.2 EU/DL

## 2024-07-10 PROCEDURE — 81003 URINALYSIS AUTO W/O SCOPE: CPT | Performed by: UROLOGY

## 2024-07-10 PROCEDURE — 52000 CYSTOURETHROSCOPY: CPT | Performed by: UROLOGY

## 2024-07-10 PROCEDURE — 99215 OFFICE O/P EST HI 40 MIN: CPT | Performed by: UROLOGY

## 2024-07-10 RX ORDER — CEFAZOLIN SODIUM 2 G/100ML
2 INJECTION, SOLUTION INTRAVENOUS ONCE
OUTPATIENT
Start: 2024-07-10 | End: 2024-07-10

## 2024-07-10 RX ORDER — SODIUM CHLORIDE, SODIUM LACTATE, POTASSIUM CHLORIDE, CALCIUM CHLORIDE 600; 310; 30; 20 MG/100ML; MG/100ML; MG/100ML; MG/100ML
100 INJECTION, SOLUTION INTRAVENOUS CONTINUOUS
OUTPATIENT
Start: 2024-07-10

## 2024-07-10 ASSESSMENT — PAIN SCALES - GENERAL: PAINLEVEL: 2

## 2024-08-02 ENCOUNTER — APPOINTMENT (OUTPATIENT)
Dept: CARDIOLOGY | Facility: CLINIC | Age: 74
End: 2024-08-02
Payer: MEDICARE

## 2024-08-02 DIAGNOSIS — I10 PRIMARY HYPERTENSION: Primary | ICD-10-CM

## 2024-08-12 ENCOUNTER — PRE-ADMISSION TESTING (OUTPATIENT)
Dept: PREADMISSION TESTING | Facility: HOSPITAL | Age: 74
End: 2024-08-12
Payer: MEDICARE

## 2024-08-12 VITALS
WEIGHT: 216 LBS | DIASTOLIC BLOOD PRESSURE: 83 MMHG | OXYGEN SATURATION: 100 % | SYSTOLIC BLOOD PRESSURE: 129 MMHG | TEMPERATURE: 98.6 F | BODY MASS INDEX: 31.99 KG/M2 | HEART RATE: 71 BPM | HEIGHT: 69 IN

## 2024-08-12 DIAGNOSIS — Z01.818 PREOP TESTING: Primary | ICD-10-CM

## 2024-08-12 PROCEDURE — 93010 ELECTROCARDIOGRAM REPORT: CPT | Performed by: INTERNAL MEDICINE

## 2024-08-12 PROCEDURE — 93005 ELECTROCARDIOGRAM TRACING: CPT

## 2024-08-12 PROCEDURE — 99204 OFFICE O/P NEW MOD 45 MIN: CPT | Performed by: NURSE PRACTITIONER

## 2024-08-12 ASSESSMENT — ENCOUNTER SYMPTOMS
NEUROLOGICAL NEGATIVE: 1
PALPITATIONS: 1
MUSCULOSKELETAL NEGATIVE: 1
CONSTITUTIONAL NEGATIVE: 1
GASTROINTESTINAL NEGATIVE: 1
ENDOCRINE NEGATIVE: 1
RESPIRATORY NEGATIVE: 1
NECK NEGATIVE: 1
EYES NEGATIVE: 1

## 2024-08-12 ASSESSMENT — DUKE ACTIVITY SCORE INDEX (DASI)
CAN YOU RUN A SHORT DISTANCE: YES
CAN YOU DO MODERATE WORK AROUND THE HOUSE LIKE VACUUMING, SWEEPING FLOORS OR CARRYING GROCERIES: YES
CAN YOU WALK INDOORS, SUCH AS AROUND YOUR HOUSE: YES
CAN YOU DO YARD WORK LIKE RAKING LEAVES, WEEDING OR PUSHING A MOWER: YES
CAN YOU TAKE CARE OF YOURSELF (EAT, DRESS, BATHE, OR USE TOILET): YES
CAN YOU PARTICIPATE IN STRENOUS SPORTS LIKE SWIMMING, SINGLES TENNIS, FOOTBALL, BASKETBALL, OR SKIING: NO
CAN YOU HAVE SEXUAL RELATIONS: YES
CAN YOU WALK A BLOCK OR TWO ON LEVEL GROUND: YES
CAN YOU DO LIGHT WORK AROUND THE HOUSE LIKE DUSTING OR WASHING DISHES: YES
CAN YOU DO HEAVY WORK AROUND THE HOUSE LIKE SCRUBBING FLOORS OR LIFTING AND MOVING HEAVY FURNITURE: YES
CAN YOU PARTICIPATE IN MODERATE RECREATIONAL ACTIVITIES LIKE GOLF, BOWLING, DANCING, DOUBLES TENNIS OR THROWING A BASEBALL OR FOOTBALL: NO
TOTAL_SCORE: 44.7
CAN YOU CLIMB A FLIGHT OF STAIRS OR WALK UP A HILL: YES
DASI METS SCORE: 8.2

## 2024-08-12 ASSESSMENT — PAIN - FUNCTIONAL ASSESSMENT: PAIN_FUNCTIONAL_ASSESSMENT: 0-10

## 2024-08-12 ASSESSMENT — PAIN SCALES - GENERAL: PAINLEVEL_OUTOF10: 0 - NO PAIN

## 2024-08-12 NOTE — H&P (VIEW-ONLY)
"CPM/PAT Evaluation       Name: Gaurav Fischer (Gaurav Fischer \"Rich\")  /Age: 1950/74 y.o.     In-Person       Chief Complaint: BPH for HoLEP 24 by Dr. Tejada    HPI  This is a 73 y/o male with PMHX significant for DEANGELO on CPAP, GERD, HTN, medullary thyroid cancer s/p thyroidectomy, alpha 1 antitrypsin, dyslipidemia, erectile dysfunction, BPH with LUTS, urinary frequency and urgency who is scheduled for HoLEP on 24 with Dr. Tejada. Patient denied fever, chills chest pain, abdominal pain, nausea, vomiting, or diarrhea. Endorses urinary frequency and urgency, denied hematuria or dysuria.  Patient denied history of CAD, CVA, DVT, or PE    Past Medical History:   Diagnosis Date    Dependence on other enabling machines and devices     History of continuous positive airway pressure (CPAP) therapy at home    Encounter for general adult medical examination without abnormal findings 2019    Welcome to Medicare preventive visit    Hip pain, acute, right     Low back pain     Personal history of other endocrine, nutritional and metabolic disease 2015    History of type 2 multiple endocrine neoplasia (MEN)       Past Surgical History:   Procedure Laterality Date    APPENDECTOMY  2015    Appendectomy    COLONOSCOPY  2015    Complete Colonoscopy    HERNIA REPAIR  2015    Hernia Repair    OTHER SURGICAL HISTORY  2017    Laminectomy Decompressive Up To Two Lumbar Segments    OTHER SURGICAL HISTORY  2021    Back surgery    SINUS SURGERY  2015    Sinus Surgery    TONSILLECTOMY  2015    Tonsillectomy    TOTAL THYROIDECTOMY  2022    Thyroid Surgery Total Thyroidectomy       Patient Sexual activity questions deferred to the physician.    Family History   Problem Relation Name Age of Onset    Breast cancer Mother      Other (mesothelioma) Father      Breast cancer Sister      Diabetes type II Sister         Social History     Socioeconomic History    Marital " status:      Spouse name: Not on file    Number of children: Not on file    Years of education: Not on file    Highest education level: Not on file   Occupational History    Not on file   Tobacco Use    Smoking status: Former     Types: Cigars    Smokeless tobacco: Never   Vaping Use    Vaping status: Never Used   Substance and Sexual Activity    Alcohol use: Yes     Alcohol/week: 3.0 standard drinks of alcohol     Types: 3 Cans of beer per week     Comment: 1/2 BEER OR GLASS OF WINE NIGHTLY    Drug use: Never    Sexual activity: Defer   Other Topics Concern    Not on file   Social History Narrative    Not on file     Social Determinants of Health     Financial Resource Strain: Not on file   Food Insecurity: Not on file   Transportation Needs: Not on file   Physical Activity: Not on file   Stress: Not on file   Social Connections: Not on file   Intimate Partner Violence: Not on file   Housing Stability: Not on file      Allergies   Allergen Reactions    Sulfamethoxazole-Trimethoprim Anaphylaxis     patient states throat swells    Griseofulvin Itching     antifungals    Pollen Extracts Other       Prior to Admission medications    Medication Sig Start Date End Date Taking? Authorizing Provider   amLODIPine (Norvasc) 5 mg tablet Take 1 tablet (5 mg) by mouth once daily. 10/17/23  Yes Renetta Barnett MD   aspirin 81 mg EC tablet Take 1 tablet (81 mg) by mouth once daily. 10/18/21  Yes Historical Provider, MD   cetirizine (ZyrTEC) 10 mg tablet Take 1 tablet (10 mg) by mouth if needed. 3/3/15  Yes Historical Provider, MD   desoximetasone (Topicort) 0.25 % cream Desoximetasone 0.25 % External Cream APPLY AND GENTLY MASSAGE INTO AFFECTED AREA(S) TWICE DAILY. as needed Refills: 0 Start : 26-Oct-2016 Active 10/26/16  Yes Historical Provider, MD   famotidine (Pepcid) 20 mg tablet Take 1 tablet (20 mg) by mouth once daily.   Yes Historical Provider, MD   hydrocortisone 1 % ointment Apply topically if needed.   Yes  Historical Provider, MD   levothyroxine (Synthroid, Levoxyl) 112 mcg tablet Take 1 tablet (112 mcg) by mouth once daily in the morning. Take on empty stomach 6/11/24  Yes Renetta Barnett MD   lovastatin (Mevacor) 40 mg tablet Take 1 tablet (40 mg) by mouth once daily at bedtime. 4/4/24  Yes Renetta Barnett MD   MELATONIN ORAL Take 1 mg by mouth. Take as directed   Yes Historical Provider, MD   neomycin-polymyxin-pramoxine (Neosporin) 3.5-10,000-10 mg-unit-mg/gram cream Apply topically.   Yes Historical Provider, MD   tadalafil (Cialis) 10 mg tablet Take 1 tablet (10 mg) by mouth if needed for erectile dysfunction. 5/21/24 5/21/25 Yes Faby Tejada MD   triamcinolone (Nasacort) 55 mcg nasal inhaler Administer into affected nostril(s).   Yes Historical Provider, MD   doxycycline (Vibra-Tabs) 100 mg tablet Take by mouth every 12 hours. 4/22/20 8/12/24  Historical Provider, MD   neomycin-polymyxin B-dexameth (Polydex) 3.5 mg/g-10,000 unit/g-0.1 % ointment ophthalmic ointment Apply to affected eye(s). 8/19/22 8/12/24  Historical Provider, MD   rosuvastatin (Crestor) 20 mg tablet Take by mouth once daily. 10/18/21 8/12/24  Historical Provider, MD   sildenafil (Revatio) 20 mg tablet Take by mouth. 7/12/19 8/12/24  Historical Provider, MD   tadalafil (Cialis) 5 mg tablet Take 1 tablet (5 mg) by mouth once daily.  8/12/24  Historical Provider, MD   tamsulosin (Flomax) 0.4 mg 24 hr capsule Take 1 capsule (0.4 mg) by mouth once daily.  8/12/24  Historical Provider, MD GARRISON ROS:   Constitutional:   neg    Neuro/Psych:   neg    Eyes:   neg    Ears:   neg    Nose:   Mouth:   neg    Throat:   neg    Neck:   neg    Cardio:    palpitations  Respiratory:   neg    Endocrine:   neg    GI:   neg    :    +urgency/frequency  Musculoskeletal:   neg    Hematologic:   neg    Skin:  neg        Physical Exam  Constitutional:       Appearance: Normal appearance.   HENT:      Head: Normocephalic and atraumatic.      Nose: Nose  normal.      Mouth/Throat:      Mouth: Mucous membranes are moist.      Pharynx: Oropharynx is clear.   Eyes:      Extraocular Movements: Extraocular movements intact.      Conjunctiva/sclera: Conjunctivae normal.      Pupils: Pupils are equal, round, and reactive to light.   Cardiovascular:      Rate and Rhythm: Normal rate and regular rhythm.      Pulses: Normal pulses.      Heart sounds: Normal heart sounds.   Pulmonary:      Effort: Pulmonary effort is normal.      Breath sounds: Normal breath sounds.   Abdominal:      General: Bowel sounds are normal.      Palpations: Abdomen is soft.   Musculoskeletal:         General: Normal range of motion.      Cervical back: Normal range of motion and neck supple.   Skin:     General: Skin is warm and dry.      Capillary Refill: Capillary refill takes less than 2 seconds.   Neurological:      General: No focal deficit present.      Mental Status: He is alert and oriented to person, place, and time.   Psychiatric:         Mood and Affect: Mood normal.         Behavior: Behavior normal.          PAT AIRWAY:   Airway:     Mallampati::  III    TM distance::  >3 FB    Neck ROM::  Full        Vitals:    08/12/24 1249   BP: 129/83   Pulse: 71   Temp: 37 °C (98.6 °F)   SpO2: 100%        DASI Risk Score      Flowsheet Row Most Recent Value   DASI SCORE 44.7   METS Score (Will be calculated only when all the questions are answered) 8.2          Caprini DVT Assessment    No data to display       Modified Frailty Index    No data to display       CHADS2 Stroke Risk  Current as of 17 minutes ago        N/A 3 to 100%: High Risk   2 to < 3%: Medium Risk   0 to < 2%: Low Risk     Last Change: N/A          This score determines the patient's risk of having a stroke if the patient has atrial fibrillation.        This score is not applicable to this patient. Components are not calculated.          Revised Cardiac Risk Index    No data to display       Apfel Simplified Score    No data to  display       Risk Analysis Index Results This Encounter    No data found in the last 1 encounters.       Stop Bang Score      Flowsheet Row Most Recent Value   Do you snore loudly? 1   Do you often feel tired or fatigued after your sleep? 0   Has anyone ever observed you stop breathing in your sleep? 0   Do you have or are you being treated for high blood pressure? 1   Recent BMI (Calculated) 31.9   Is BMI greater than 35 kg/m2? 0=No   Age older than 50 years old? 1=Yes   Is your neck circumference greater than 17 inches (Male) or 16 inches (Female)? 0   Gender - Male 1=Yes   STOP-BANG Total Score 4          Scores:  ASA II  METS 8.2  DASI 44.7  RCRI 0.4%  CHADS2 2.8%    Assessment and Plan:   BPH with LUTS  -+urinary frequency/urgency, no hematuria or dysuria  MRI prostate-6/6/24  IMPRESSION:  1. BPH changes of the transition zone. Diffuse non nodular  hypointensities within the peripheral zone, without evidence of  focally restricted diffusion ( PI-RADS 2).    PI-RADS 2 - Low (clinically significant cancer is unlikely to be  present).  -urinalysis 7/10/24 unremarkable  -scheduled for HoLEP on 8/26/24 with Dr. Tejada.    HTN  -stable  -resume PTA Norvasc    GERD  -resume PTA Pepcid    Hypothyroidism  History of medullary thyroid cancer s/p thyroidectomy  -TSH 3/99 [id number] with Dr. Barnett outpt  -resume PTA Levothyroxine    HLD  -resume PTA Mevacor      EKG 8/12/24 - NSR - no acute changes noted  BMP, CBC, PT/INR, urine culture  pending      Sona Faustin APRN-CNP, DNP

## 2024-08-12 NOTE — PREPROCEDURE INSTRUCTIONS
Medication List            Accurate as of August 12, 2024  1:08 PM. Always use your most recent med list.                amLODIPine 5 mg tablet  Commonly known as: Norvasc  Take 1 tablet (5 mg) by mouth once daily.  Medication Adjustments for Surgery: Take morning of surgery with sip of water, no other fluids     aspirin 81 mg EC tablet  Medication Adjustments for Surgery: Stop 7 days before surgery     desoximetasone 0.25 % cream  Commonly known as: Topicort  Medication Adjustments for Surgery: Other (Comment)  Notes to patient: HOLD THE MORNING OF SURGERY     famotidine 20 mg tablet  Commonly known as: Pepcid  Medication Adjustments for Surgery: Take morning of surgery with sip of water, no other fluids     hydrocortisone 1 % ointment  Medication Adjustments for Surgery: Other (Comment)  Notes to patient: HOLD THE MORNING OF SURGERY     levothyroxine 112 mcg tablet  Commonly known as: Synthroid, Levoxyl  Take 1 tablet (112 mcg) by mouth once daily in the morning. Take on empty stomach  Medication Adjustments for Surgery: Take morning of surgery with sip of water, no other fluids     lovastatin 40 mg tablet  Commonly known as: Mevacor  Take 1 tablet (40 mg) by mouth once daily at bedtime.  Medication Adjustments for Surgery: Other (Comment)  Notes to patient: CAN TAKE THE NIGHT BEFORE SURGERY     MELATONIN ORAL  Medication Adjustments for Surgery: Other (Comment)  Notes to patient: CAN TAKE THE NIGHT BEFORE SURGERY     Nasacort 55 mcg nasal inhaler  Generic drug: triamcinolone  Medication Adjustments for Surgery: Other (Comment)  Notes to patient: CAN USE THE MORNING OF SURGERY IF NEEDED     neomycin-polymyxin-pramoxine 3.5-10,000-10 mg-unit-mg/gram cream  Commonly known as: Neosporin  Medication Adjustments for Surgery: Other (Comment)  Notes to patient: HOLD DAY OF SURGERY     tadalafil 10 mg tablet  Commonly known as: Cialis  Take 1 tablet (10 mg) by mouth if needed for erectile dysfunction.  Medication  Adjustments for Surgery: Stop 3 days before surgery  Notes to patient: LAST DOSE 8/22/24     ZyrTEC 10 mg tablet  Generic drug: cetirizine  Medication Adjustments for Surgery: Other (Comment)  Notes to patient: HOLD THE MORNING OF SURGERY                 Preoperative Fasting Guidelines    Why must I stop eating and drinking near surgery time?  With sedation, food or liquid in your stomach can enter your lungs causing serious complications  Increases nausea and vomiting    When do I need to stop eating and drinking before my surgery?  Do not eat any food or drink any liquids after midnight the night before your surgery/procedure.  You may have sips of water to take medications.    PAT DISCHARGE INSTRUCTIONS    Please call the Same Day Surgery (SDS) Department of the hospital where your procedure will be performed after 2:00 PM the day before your surgery. If you are scheduled on a Monday, or a Tuesday following a Monday holiday, you will need to call on the last business day prior to your surgery.    [facility]  [id number] [place] [address], [id number] [phone]  [address] [address] [phone]  [facility]  [address] [place] [address]  [phone]    Please let your surgeon know if:      You develop any open sores, shingles, burning or painful urination as these may increase your risk of an infection.   You no longer wish to have the surgery.   Any other personal circumstances change that may lead to the need to cancel or defer this surgery-such as being sick or getting admitted to any hospital within one week of your planned procedure.    Your contact details change, such as a change of address or phone number.    Starting now:     Please DO NOT drink alcohol or smoke for 24 hours before surgery. It is well known that quitting smoking  can make a huge difference to your health and recovery from surgery. The longer you abstain from smoking, the better your chances of a healthy recovery. If you need help with quitting, call 1800-QUIT-NOW to be connected to a trained counselor who will discuss the best methods to help you quit.     Before your surgery:    Please stop all supplements 7 days prior to surgery. Or as directed by your surgeon.   Please stop taking NSAID pain medicine such as Advil and Motrin 7 days before surgery.    If you develop any fever, cough, cold, rashes, cuts, scratches, scrapes, urinary symptoms or infection anywhere on your body (including teeth and gums) prior to surgery, please call your surgeon’s office as soon as possible. This may require treatment to reduce the chance of cancellation on the day of surgery.    The day before your surgery:   DIET- Please follow the diet instructions at the top of your packet.   Get a good night’s rest.  Use the special soap for bathing if you have been instructed to use one.    Scheduled surgery times may change and you will be notified if this occurs - please check your personal voicemail for any updates.     On the morning of surgery:   Wear comfortable, loose fitting clothes which open in the front. Please do not wear moisturizers, creams, lotions, makeup or perfume.    Please bring with you to surgery:   Photo ID and insurance card   Current list of medicines and allergies   Pacemaker/ Defibrillator/Heart stent cards   CPAP machine and mask    Slings/ splints/ crutches   A copy of your complete advanced directive/DHPOA.    Please do NOT bring with you to surgery:   All jewelry and valuables should be left at home.   Prosthetic devices such as contact lenses, hearing aids, dentures, eyelash extensions, hairpins and body piercings must be removed prior to going in to the surgical suite.    After outpatient surgery:   A responsible adult MUST accompany you at the time of discharge and  stay with you for 24 hours after your surgery. You may NOT drive yourself home after surgery.    Do not drive, operate machinery, make critical decisions or do activities that require co-ordination or balance until after a night’s sleep.   Do not drink alcoholic beverages for 24 hours.   Instructions for resuming your medications will be provided by your surgeon.    CALL YOUR DOCTOR AFTER SURGERY IF YOU HAVE:     Chills and/or a fever of 101° F or higher.    Redness, swelling, pus or drainage from your surgical wound or a bad smell from the wound.    Lightheadedness, fainting or confusion.    Persistent vomiting (throwing up) and are not able to eat or drink for 12 hours.    Three or more loose, watery bowel movements in 24 hours (diarrhea).   Difficulty or pain while urinating( after non-urological surgery)    Pain and swelling in your legs, especially if it is only on one side.    Difficulty breathing or are breathing faster than normal.    Any new concerning symptoms.

## 2024-08-12 NOTE — CPM/PAT H&P
"CPM/PAT Evaluation       Name: Gaurav Fischer (Gaurav Fischer \"Rich\")  /Age: 1950/74 y.o.     In-Person       Chief Complaint: BPH for HoLEP 24 by Dr. Tejada    HPI  This is a 73 y/o male with PMHX significant for DEANGELO on CPAP, GERD, HTN, medullary thyroid cancer s/p thyroidectomy, alpha 1 antitrypsin, dyslipidemia, erectile dysfunction, BPH with LUTS, urinary frequency and urgency who is scheduled for HoLEP on 24 with Dr. Tejada. Patient denied fever, chills chest pain, abdominal pain, nausea, vomiting, or diarrhea. Endorses urinary frequency and urgency, denied hematuria or dysuria.  Patient denied history of CAD, CVA, DVT, or PE    Past Medical History:   Diagnosis Date    Dependence on other enabling machines and devices     History of continuous positive airway pressure (CPAP) therapy at home    Encounter for general adult medical examination without abnormal findings 2019    Welcome to Medicare preventive visit    Hip pain, acute, right     Low back pain     Personal history of other endocrine, nutritional and metabolic disease 2015    History of type 2 multiple endocrine neoplasia (MEN)       Past Surgical History:   Procedure Laterality Date    APPENDECTOMY  2015    Appendectomy    COLONOSCOPY  2015    Complete Colonoscopy    HERNIA REPAIR  2015    Hernia Repair    OTHER SURGICAL HISTORY  2017    Laminectomy Decompressive Up To Two Lumbar Segments    OTHER SURGICAL HISTORY  2021    Back surgery    SINUS SURGERY  2015    Sinus Surgery    TONSILLECTOMY  2015    Tonsillectomy    TOTAL THYROIDECTOMY  2022    Thyroid Surgery Total Thyroidectomy       Patient Sexual activity questions deferred to the physician.    Family History   Problem Relation Name Age of Onset    Breast cancer Mother      Other (mesothelioma) Father      Breast cancer Sister      Diabetes type II Sister         Social History     Socioeconomic History    Marital " status:      Spouse name: Not on file    Number of children: Not on file    Years of education: Not on file    Highest education level: Not on file   Occupational History    Not on file   Tobacco Use    Smoking status: Former     Types: Cigars    Smokeless tobacco: Never   Vaping Use    Vaping status: Never Used   Substance and Sexual Activity    Alcohol use: Yes     Alcohol/week: 3.0 standard drinks of alcohol     Types: 3 Cans of beer per week     Comment: 1/2 BEER OR GLASS OF WINE NIGHTLY    Drug use: Never    Sexual activity: Defer   Other Topics Concern    Not on file   Social History Narrative    Not on file     Social Determinants of Health     Financial Resource Strain: Not on file   Food Insecurity: Not on file   Transportation Needs: Not on file   Physical Activity: Not on file   Stress: Not on file   Social Connections: Not on file   Intimate Partner Violence: Not on file   Housing Stability: Not on file      Allergies   Allergen Reactions    Sulfamethoxazole-Trimethoprim Anaphylaxis     patient states throat swells    Griseofulvin Itching     antifungals    Pollen Extracts Other       Prior to Admission medications    Medication Sig Start Date End Date Taking? Authorizing Provider   amLODIPine (Norvasc) 5 mg tablet Take 1 tablet (5 mg) by mouth once daily. 10/17/23  Yes Renetta Barnett MD   aspirin 81 mg EC tablet Take 1 tablet (81 mg) by mouth once daily. 10/18/21  Yes Historical Provider, MD   cetirizine (ZyrTEC) 10 mg tablet Take 1 tablet (10 mg) by mouth if needed. 3/3/15  Yes Historical Provider, MD   desoximetasone (Topicort) 0.25 % cream Desoximetasone 0.25 % External Cream APPLY AND GENTLY MASSAGE INTO AFFECTED AREA(S) TWICE DAILY. as needed Refills: 0 Start : 26-Oct-2016 Active 10/26/16  Yes Historical Provider, MD   famotidine (Pepcid) 20 mg tablet Take 1 tablet (20 mg) by mouth once daily.   Yes Historical Provider, MD   hydrocortisone 1 % ointment Apply topically if needed.   Yes  Historical Provider, MD   levothyroxine (Synthroid, Levoxyl) 112 mcg tablet Take 1 tablet (112 mcg) by mouth once daily in the morning. Take on empty stomach 6/11/24  Yes Renetta Barnett MD   lovastatin (Mevacor) 40 mg tablet Take 1 tablet (40 mg) by mouth once daily at bedtime. 4/4/24  Yes Renetta Barnett MD   MELATONIN ORAL Take 1 mg by mouth. Take as directed   Yes Historical Provider, MD   neomycin-polymyxin-pramoxine (Neosporin) 3.5-10,000-10 mg-unit-mg/gram cream Apply topically.   Yes Historical Provider, MD   tadalafil (Cialis) 10 mg tablet Take 1 tablet (10 mg) by mouth if needed for erectile dysfunction. 5/21/24 5/21/25 Yes Faby Tejada MD   triamcinolone (Nasacort) 55 mcg nasal inhaler Administer into affected nostril(s).   Yes Historical Provider, MD   doxycycline (Vibra-Tabs) 100 mg tablet Take by mouth every 12 hours. 4/22/20 8/12/24  Historical Provider, MD   neomycin-polymyxin B-dexameth (Polydex) 3.5 mg/g-10,000 unit/g-0.1 % ointment ophthalmic ointment Apply to affected eye(s). 8/19/22 8/12/24  Historical Provider, MD   rosuvastatin (Crestor) 20 mg tablet Take by mouth once daily. 10/18/21 8/12/24  Historical Provider, MD   sildenafil (Revatio) 20 mg tablet Take by mouth. 7/12/19 8/12/24  Historical Provider, MD   tadalafil (Cialis) 5 mg tablet Take 1 tablet (5 mg) by mouth once daily.  8/12/24  Historical Provider, MD   tamsulosin (Flomax) 0.4 mg 24 hr capsule Take 1 capsule (0.4 mg) by mouth once daily.  8/12/24  Historical Provider, MD GARRISON ROS:   Constitutional:   neg    Neuro/Psych:   neg    Eyes:   neg    Ears:   neg    Nose:   Mouth:   neg    Throat:   neg    Neck:   neg    Cardio:    palpitations  Respiratory:   neg    Endocrine:   neg    GI:   neg    :    +urgency/frequency  Musculoskeletal:   neg    Hematologic:   neg    Skin:  neg        Physical Exam  Constitutional:       Appearance: Normal appearance.   HENT:      Head: Normocephalic and atraumatic.      Nose: Nose  normal.      Mouth/Throat:      Mouth: Mucous membranes are moist.      Pharynx: Oropharynx is clear.   Eyes:      Extraocular Movements: Extraocular movements intact.      Conjunctiva/sclera: Conjunctivae normal.      Pupils: Pupils are equal, round, and reactive to light.   Cardiovascular:      Rate and Rhythm: Normal rate and regular rhythm.      Pulses: Normal pulses.      Heart sounds: Normal heart sounds.   Pulmonary:      Effort: Pulmonary effort is normal.      Breath sounds: Normal breath sounds.   Abdominal:      General: Bowel sounds are normal.      Palpations: Abdomen is soft.   Musculoskeletal:         General: Normal range of motion.      Cervical back: Normal range of motion and neck supple.   Skin:     General: Skin is warm and dry.      Capillary Refill: Capillary refill takes less than 2 seconds.   Neurological:      General: No focal deficit present.      Mental Status: He is alert and oriented to person, place, and time.   Psychiatric:         Mood and Affect: Mood normal.         Behavior: Behavior normal.          PAT AIRWAY:   Airway:     Mallampati::  III    TM distance::  >3 FB    Neck ROM::  Full        Vitals:    08/12/24 1249   BP: 129/83   Pulse: 71   Temp: 37 °C (98.6 °F)   SpO2: 100%        DASI Risk Score      Flowsheet Row Most Recent Value   DASI SCORE 44.7   METS Score (Will be calculated only when all the questions are answered) 8.2          Caprini DVT Assessment    No data to display       Modified Frailty Index    No data to display       CHADS2 Stroke Risk  Current as of 17 minutes ago        N/A 3 to 100%: High Risk   2 to < 3%: Medium Risk   0 to < 2%: Low Risk     Last Change: N/A          This score determines the patient's risk of having a stroke if the patient has atrial fibrillation.        This score is not applicable to this patient. Components are not calculated.          Revised Cardiac Risk Index    No data to display       Apfel Simplified Score    No data to  display       Risk Analysis Index Results This Encounter    No data found in the last 1 encounters.       Stop Bang Score      Flowsheet Row Most Recent Value   Do you snore loudly? 1   Do you often feel tired or fatigued after your sleep? 0   Has anyone ever observed you stop breathing in your sleep? 0   Do you have or are you being treated for high blood pressure? 1   Recent BMI (Calculated) 31.9   Is BMI greater than 35 kg/m2? 0=No   Age older than 50 years old? 1=Yes   Is your neck circumference greater than 17 inches (Male) or 16 inches (Female)? 0   Gender - Male 1=Yes   STOP-BANG Total Score 4          Scores:  ASA II  METS 8.2  DASI 44.7  RCRI 0.4%  CHADS2 2.8%    Assessment and Plan:   BPH with LUTS  -+urinary frequency/urgency, no hematuria or dysuria  MRI prostate-6/6/24  IMPRESSION:  1. BPH changes of the transition zone. Diffuse non nodular  hypointensities within the peripheral zone, without evidence of  focally restricted diffusion ( PI-RADS 2).    PI-RADS 2 - Low (clinically significant cancer is unlikely to be  present).  -urinalysis 7/10/24 unremarkable  -scheduled for HoLEP on 8/26/24 with Dr. Tejada.    HTN  -stable  -resume PTA Norvasc    GERD  -resume PTA Pepcid    Hypothyroidism  History of medullary thyroid cancer s/p thyroidectomy  -TSH 3/99 [id number] with Dr. Barnett outpt  -resume PTA Levothyroxine    HLD  -resume PTA Mevacor      EKG 8/12/24 - NSR - no acute changes noted  BMP, CBC, PT/INR, urine culture  pending      Sona Faustin APRN-CNP, DNP

## 2024-08-14 ENCOUNTER — LAB (OUTPATIENT)
Dept: LAB | Facility: LAB | Age: 74
End: 2024-08-14
Payer: MEDICARE

## 2024-08-14 DIAGNOSIS — N33 BLADDER DISORDERS IN DISEASES CLASSIFIED ELSEWHERE: ICD-10-CM

## 2024-08-14 DIAGNOSIS — Z01.818 PREOP TESTING: ICD-10-CM

## 2024-08-14 DIAGNOSIS — N40.1 BENIGN PROSTATIC HYPERPLASIA WITH URINARY FREQUENCY: ICD-10-CM

## 2024-08-14 DIAGNOSIS — R35.0 BENIGN PROSTATIC HYPERPLASIA WITH URINARY FREQUENCY: ICD-10-CM

## 2024-08-14 LAB
ANION GAP SERPL CALC-SCNC: 12 MMOL/L
BUN SERPL-MCNC: 18 MG/DL (ref 8–25)
CALCIUM SERPL-MCNC: 8.9 MG/DL (ref 8.5–10.4)
CHLORIDE SERPL-SCNC: 106 MMOL/L (ref 97–107)
CO2 SERPL-SCNC: 25 MMOL/L (ref 24–31)
CREAT SERPL-MCNC: 1.1 MG/DL (ref 0.4–1.6)
EGFRCR SERPLBLD CKD-EPI 2021: 71 ML/MIN/1.73M*2
ERYTHROCYTE [DISTWIDTH] IN BLOOD BY AUTOMATED COUNT: 13.4 % (ref 11.5–14.5)
GLUCOSE SERPL-MCNC: 93 MG/DL (ref 65–99)
HCT VFR BLD AUTO: 35.5 % (ref 41–52)
HGB BLD-MCNC: 11.5 G/DL (ref 13.5–17.5)
INR PPP: 1 (ref 0.9–1.2)
MCH RBC QN AUTO: 26.7 PG (ref 26–34)
MCHC RBC AUTO-ENTMCNC: 32.4 G/DL (ref 32–36)
MCV RBC AUTO: 83 FL (ref 80–100)
NRBC BLD-RTO: 0 /100 WBCS (ref 0–0)
PLATELET # BLD AUTO: 267 X10*3/UL (ref 150–450)
POTASSIUM SERPL-SCNC: 4.5 MMOL/L (ref 3.4–5.1)
PROTHROMBIN TIME: 10.9 SECONDS (ref 9.3–12.7)
RBC # BLD AUTO: 4.3 X10*6/UL (ref 4.5–5.9)
SODIUM SERPL-SCNC: 143 MMOL/L (ref 133–145)
WBC # BLD AUTO: 5.1 X10*3/UL (ref 4.4–11.3)

## 2024-08-14 PROCEDURE — 80048 BASIC METABOLIC PNL TOTAL CA: CPT

## 2024-08-14 PROCEDURE — 85027 COMPLETE CBC AUTOMATED: CPT

## 2024-08-14 PROCEDURE — 85610 PROTHROMBIN TIME: CPT

## 2024-08-14 PROCEDURE — 87086 URINE CULTURE/COLONY COUNT: CPT

## 2024-08-14 PROCEDURE — 36415 COLL VENOUS BLD VENIPUNCTURE: CPT

## 2024-08-15 LAB
ATRIAL RATE: 65 BPM
BACTERIA UR CULT: NO GROWTH
P AXIS: -3 DEGREES
P OFFSET: 192 MS
P ONSET: 126 MS
PR INTERVAL: 200 MS
Q ONSET: 226 MS
QRS COUNT: 11 BEATS
QRS DURATION: 96 MS
QT INTERVAL: 414 MS
QTC CALCULATION(BAZETT): 430 MS
QTC FREDERICIA: 425 MS
R AXIS: -3 DEGREES
T AXIS: 51 DEGREES
T OFFSET: 433 MS
VENTRICULAR RATE: 65 BPM

## 2024-08-18 ASSESSMENT — ENCOUNTER SYMPTOMS
DOUBLE VISION: 0
EYE PAIN: 0
BACK PAIN: 0
ORTHOPNEA: 0
LOSS OF BALANCE: 0
HEMOPTYSIS: 0
SPUTUM PRODUCTION: 0
ENDOCRINE NEGATIVE: 1
IRREGULAR HEARTBEAT: 1
DIZZINESS: 0
NEAR-SYNCOPE: 0
CHILLS: 0
CHANGE IN BOWEL HABIT: 0
HEADACHES: 0
PALPITATIONS: 0
DECREASED LIBIDO: 0
JOINT SWELLING: 0
COUGH: 0
EYE DISCHARGE: 0
BLOATING: 0
PSYCHIATRIC NEGATIVE: 1
DIAPHORESIS: 0
CLAUDICATION: 0
SHORTNESS OF BREATH: 0
FEVER: 0
PND: 0
WHEEZING: 0
BOWEL INCONTINENCE: 0
FALLS: 0
DECREASED APPETITE: 0
BLURRED VISION: 0
DYSPNEA ON EXERTION: 0
ABDOMINAL PAIN: 0
ANOREXIA: 0
LIGHT-HEADEDNESS: 0

## 2024-08-19 ENCOUNTER — APPOINTMENT (OUTPATIENT)
Dept: CARDIOLOGY | Facility: CLINIC | Age: 74
End: 2024-08-19
Payer: MEDICARE

## 2024-08-19 DIAGNOSIS — I10 PRIMARY HYPERTENSION: Primary | ICD-10-CM

## 2024-08-19 NOTE — PROGRESS NOTES
"Chief Complaint:   No chief complaint on file.     History Of Present Illness:    Sidney Fischer is a 74 y.o. male presenting with a pertinent medical history notable for essential hypertension, BPH, alpha 1 antitrypsin, dyslipidemia, dyspnea on exertion, gastroesophageal reflux disease, hypersomnia with obstructive sleep apnea, history of medullary thyroid carcinoma who presents to cardiology for follow up of of heart palpitations and presyncope and dyslipidemia with elevated CAC     His history is rather convoluted. Recall   --States that he will have intermittent periods of sensations of a \"pounding heart\" that usually occur in the evening. He notes that these usually occur after day of heavy exertion, such as shoveling snow, moving 50 pound bags of lawn care materials, or watching baseball games in the sun. He reports that he has no chest discomfort, palpitations, heaviness, dyspnea exertion during these episodes, usually occurs right when he is about to go to bed later that night.   He also reports intermittent periods of presyncope, lightheadedness, dizziness that occur while he is in [employer]. States that his symptoms will somewhat chanel when he is able to sit back down. Further, he is very concerned about \"circulation problems\" in his hands and arms. He reports intermittent numbness, tingling as well as change in temperature of his hands. Additionally, he is concerned about \"things building up in my heart\" and he has been doing \"a lot of research about coronary calcium and wonders whether he should have this done\"      Since he was last seen, he reports that many of his symptoms have improved following reduction his his thyroid replacement.Otherwise, no issues. He feels dramatically improved. He has been following his cholesterol levels, and is shocked at how well he is responding to his current medication. He reports no myalgias, arthralgias.     Today ( 2/6/2023) he returns for scheduled follow-up. He reports " "that his dizziness has improved, feels that this was related to his levothyroxine dose. He notes that he has not had any issues with heart palpitations. He has been shooting pool to stay active., goes square dancing every other weekend. States that he had been out of breath several years ago, but no longer. He is able to keep up and enjoys it.     10/360/2023 -- He returns for follow up. He had been doing quiet well until about 1 week prior. He had an episode of heart palpitations where he heart was \"Pounding\" and \"Slow\"  This occurred about the time of COVID vaccine. He notes that he had been in [employer] [employer] and walked ~ 7 miles daily. He was able to climb 1000' ascent ( 5500 --> 6500 feet of elevation) without chest pressure or pain, shortness of breath. He had [facility] where his cholesterol panel was obtained showing very acceptable levels with TC < 120. Its is surprising that it wasn't uploaded to his chart.  He is otherwise doing well.       Review of Systems   Constitutional: Negative for chills, decreased appetite, diaphoresis and fever.   HENT:  Negative for congestion, ear discharge, ear pain and hearing loss.    Eyes:  Negative for blurred vision, discharge, double vision and pain.   Cardiovascular:  Positive for irregular heartbeat. Negative for chest pain, claudication, cyanosis, dyspnea on exertion, leg swelling, near-syncope, orthopnea, palpitations and paroxysmal nocturnal dyspnea.   Respiratory:  Negative for cough, hemoptysis, shortness of breath, sputum production and wheezing.    Endocrine: Negative.    Skin: Negative.    Musculoskeletal:  Negative for arthritis, back pain, falls, joint pain, joint swelling and muscle weakness.   Gastrointestinal:  Negative for bloating, abdominal pain, anorexia, change in bowel habit and bowel incontinence.   Genitourinary:  Negative for bladder incontinence and decreased libido.   Neurological:  Negative for " dizziness, headaches, light-headedness and loss of balance.   Psychiatric/Behavioral: Negative.           Last Recorded Vitals:  There were no vitals filed for this visit.      Past Medical History:  He has a past medical history of Dependence on other enabling machines and devices, Encounter for general adult medical examination without abnormal findings (07/12/2019), Hip pain, acute, right, Low back pain, and Personal history of other endocrine, nutritional and metabolic disease (03/03/2015).    Past Surgical History:  He has a past surgical history that includes Colonoscopy (03/03/2015); Appendectomy (03/03/2015); Tonsillectomy (03/03/2015); Sinus surgery (03/03/2015); Hernia repair (03/03/2015); Other surgical history (02/17/2017); Other surgical history (04/08/2021); and Total thyroidectomy (01/05/2022).      Social History:  He reports that he has quit smoking. His smoking use included cigars. He has never used smokeless tobacco. He reports current alcohol use of about 3.0 standard drinks of alcohol per week. He reports that he does not use drugs.    Family History:  Family History   Problem Relation Name Age of Onset   • Breast cancer Mother     • Other (mesothelioma) Father     • Breast cancer Sister     • Diabetes type II Sister          Allergies:  Sulfamethoxazole-trimethoprim, Griseofulvin, and Pollen extracts    Outpatient Medications:  Current Outpatient Medications   Medication Instructions   • amLODIPine (NORVASC) 5 mg, oral, Daily   • aspirin 81 mg EC tablet 1 tablet, oral, Daily   • cetirizine (ZYRTEC) 10 mg, oral, As needed   • desoximetasone (Topicort) 0.25 % cream Desoximetasone 0.25 % External Cream APPLY AND GENTLY MASSAGE INTO AFFECTED AREA(S) TWICE DAILY. as needed Refills: 0 Start : 26-Oct-2016 Active   • famotidine (PEPCID) 20 mg, oral, Daily   • hydrocortisone 1 % ointment Topical, As needed   • levothyroxine (SYNTHROID, LEVOXYL) 112 mcg, oral, Every morning, Take on empty stomach   •  lovastatin (MEVACOR) 40 mg, oral, Nightly   • MELATONIN ORAL 1 mg, oral, Take as directed   • neomycin-polymyxin-pramoxine (Neosporin) 3.5-10,000-10 mg-unit-mg/gram cream Topical   • tadalafil (CIALIS) 10 mg, oral, As needed   • triamcinolone (Nasacort) 55 mcg nasal inhaler nasal       Physical Exam:  There were no vitals taken for this visit.    General Appearance:  Alert, cooperative, no distress, appears stated age   Head:  Normocephalic, without obvious abnormality, atraumatic   Eyes:  PERRL, cboth eyes   Ears:  Normal ears   Nose: Nares normal, septum midline, mucosa normal, no drainage or sinus tenderness   Throat: Lips, mucosa, and tongue normal; teeth and gums normal   Neck: Supple,  no carotid bruit or JVD   Back:   Symmetric, no curvature, ROM normal, no CVA tenderness   Lungs:   Clear to auscultation bilaterally, respirations unlabored   Chest Wall:  No tenderness or deformity   Heart:  Regular rate and rhythm, S1, S2 normal, no murmur, rub or gallop   Abdomen:   Soft, non-tender, bowel sounds active all four quadrants,  no masses, no organomegaly   Extremities: Extremities normal, atraumatic, no cyanosis or edema   Pulses: 2+ and symmetric   Skin: Skin color, texture, turgor normal, no rashes or lesions   Lymph nodes: Cervical, supraclavicular, and axillary nodes normal   Neurologic: Normal          Last Labs:  CBC -  Lab Results   Component Value Date    WBC 5.1 08/14/2024    HGB 11.5 (L) 08/14/2024    HCT 35.5 (L) 08/14/2024    MCV 83 08/14/2024     08/14/2024       CMP -  Lab Results   Component Value Date    CALCIUM 8.9 08/14/2024    PHOS 3.4 02/06/2023    PROT 7.3 01/19/2024    ALBUMIN 4.8 01/19/2024    AST 26 01/19/2024    ALT 19 01/19/2024    ALKPHOS 70 01/19/2024    BILITOT 0.6 01/19/2024       LIPID PANEL -   Lab Results   Component Value Date    CHOL 156 01/19/2024    TRIG 82 01/19/2024    HDL 45.6 01/19/2024    CHHDL 3.4 01/19/2024    LDLF 69 07/14/2023    VLDL 16 01/19/2024    NHDL 110  01/19/2024       RENAL FUNCTION PANEL -   Lab Results   Component Value Date    GLUCOSE 93 08/14/2024     08/14/2024    K 4.5 08/14/2024     08/14/2024    CO2 25 08/14/2024    ANIONGAP 12 08/14/2024    ANIONGAP 11 01/19/2024    BUN 18 08/14/2024    CREATININE 1.10 08/14/2024    GFRMALE 71 07/14/2023    CALCIUM 8.9 08/14/2024    PHOS 3.4 02/06/2023    ALBUMIN 4.8 01/19/2024        Lab Results   Component Value Date    HGBA1C 5.6 05/19/2022       Last Cardiology Tests:  ECG:  In Office EKG       Echo:  05/2021   1. The left ventricular systolic function is normal with a 60-65% estimated ejection fraction.   2. Borderline increased LV mass.   3. Moderately increased left ventricular septal thickness.   4. RVSP within normal limits.      Cardiac Imaging:  CT HEART CALCIUM SCORING WO IV CONTRAST 2/13/2023  FINDINGS:  The score and distribution of calcium in the coronary arteries is as  follows:     .49,  .35,  LCx 0,  RCA 0,     Total   829.84    Lab review: I have personally reviewed the laboratory result(s)   Diagnostic review: I have personally reviewed the result(s) of the EKG and Echocardiogram .   Imaging review: I have  personally reviewed the result(s) CT Coronary Artery Calcium Scoring     Assessment/Plan   Problem List Items Addressed This Visit    None      Continues to do well from a cardiac standpoint and offers no symptoms   -- Continue current medications at this time  -- Discussed stress testing, event monitor for heart palpitations that he experienced.  He wishes to refrain at this time but will let us know if these return or increase.    Quincy Benavides, DO

## 2024-08-26 ENCOUNTER — ANESTHESIA EVENT (OUTPATIENT)
Dept: OPERATING ROOM | Facility: HOSPITAL | Age: 74
End: 2024-08-26
Payer: MEDICARE

## 2024-08-26 ENCOUNTER — HOSPITAL ENCOUNTER (OUTPATIENT)
Facility: HOSPITAL | Age: 74
Setting detail: OUTPATIENT SURGERY
Discharge: HOME | End: 2024-08-26
Attending: UROLOGY | Admitting: UROLOGY
Payer: MEDICARE

## 2024-08-26 ENCOUNTER — ANESTHESIA (OUTPATIENT)
Dept: OPERATING ROOM | Facility: HOSPITAL | Age: 74
End: 2024-08-26
Payer: MEDICARE

## 2024-08-26 VITALS
BODY MASS INDEX: 30.43 KG/M2 | OXYGEN SATURATION: 99 % | WEIGHT: 205.47 LBS | SYSTOLIC BLOOD PRESSURE: 141 MMHG | DIASTOLIC BLOOD PRESSURE: 83 MMHG | HEIGHT: 69 IN | TEMPERATURE: 97.3 F | RESPIRATION RATE: 16 BRPM | HEART RATE: 62 BPM

## 2024-08-26 DIAGNOSIS — N40.1 BENIGN PROSTATIC HYPERPLASIA WITH URINARY FREQUENCY: Primary | ICD-10-CM

## 2024-08-26 DIAGNOSIS — R35.0 URINARY FREQUENCY: ICD-10-CM

## 2024-08-26 DIAGNOSIS — R35.0 BENIGN PROSTATIC HYPERPLASIA WITH URINARY FREQUENCY: Primary | ICD-10-CM

## 2024-08-26 PROCEDURE — 3700000002 HC GENERAL ANESTHESIA TIME - EACH INCREMENTAL 1 MINUTE: Performed by: UROLOGY

## 2024-08-26 PROCEDURE — 0753T DGTZ GLS MCRSCP SLD LEVEL IV: CPT | Mod: TC,SUR,BEALAB,WESLAB | Performed by: UROLOGY

## 2024-08-26 PROCEDURE — 2720000007 HC OR 272 NO HCPCS: Performed by: UROLOGY

## 2024-08-26 PROCEDURE — 2500000004 HC RX 250 GENERAL PHARMACY W/ HCPCS (ALT 636 FOR OP/ED): Performed by: UROLOGY

## 2024-08-26 PROCEDURE — 7100000009 HC PHASE TWO TIME - INITIAL BASE CHARGE: Performed by: UROLOGY

## 2024-08-26 PROCEDURE — 2500000005 HC RX 250 GENERAL PHARMACY W/O HCPCS: Performed by: NURSE ANESTHETIST, CERTIFIED REGISTERED

## 2024-08-26 PROCEDURE — C1758 CATHETER, URETERAL: HCPCS | Performed by: UROLOGY

## 2024-08-26 PROCEDURE — 3700000001 HC GENERAL ANESTHESIA TIME - INITIAL BASE CHARGE: Performed by: UROLOGY

## 2024-08-26 PROCEDURE — 2500000004 HC RX 250 GENERAL PHARMACY W/ HCPCS (ALT 636 FOR OP/ED): Performed by: ANESTHESIOLOGIST ASSISTANT

## 2024-08-26 PROCEDURE — 52649 PROSTATE LASER ENUCLEATION: CPT | Performed by: UROLOGY

## 2024-08-26 PROCEDURE — 2500000004 HC RX 250 GENERAL PHARMACY W/ HCPCS (ALT 636 FOR OP/ED): Mod: JZ | Performed by: UROLOGY

## 2024-08-26 PROCEDURE — 7100000010 HC PHASE TWO TIME - EACH INCREMENTAL 1 MINUTE: Performed by: UROLOGY

## 2024-08-26 PROCEDURE — 3600000009 HC OR TIME - EACH INCREMENTAL 1 MINUTE - PROCEDURE LEVEL FOUR: Performed by: UROLOGY

## 2024-08-26 PROCEDURE — 7100000001 HC RECOVERY ROOM TIME - INITIAL BASE CHARGE: Performed by: UROLOGY

## 2024-08-26 PROCEDURE — 2500000004 HC RX 250 GENERAL PHARMACY W/ HCPCS (ALT 636 FOR OP/ED): Performed by: NURSE ANESTHETIST, CERTIFIED REGISTERED

## 2024-08-26 PROCEDURE — C1769 GUIDE WIRE: HCPCS | Performed by: UROLOGY

## 2024-08-26 PROCEDURE — C1889 IMPLANT/INSERT DEVICE, NOC: HCPCS | Performed by: UROLOGY

## 2024-08-26 PROCEDURE — C1782 MORCELLATOR: HCPCS | Performed by: UROLOGY

## 2024-08-26 PROCEDURE — 7100000002 HC RECOVERY ROOM TIME - EACH INCREMENTAL 1 MINUTE: Performed by: UROLOGY

## 2024-08-26 PROCEDURE — 2500000005 HC RX 250 GENERAL PHARMACY W/O HCPCS: Performed by: UROLOGY

## 2024-08-26 PROCEDURE — 3600000004 HC OR TIME - INITIAL BASE CHARGE - PROCEDURE LEVEL FOUR: Performed by: UROLOGY

## 2024-08-26 PROCEDURE — 2500000004 HC RX 250 GENERAL PHARMACY W/ HCPCS (ALT 636 FOR OP/ED): Performed by: ANESTHESIOLOGY

## 2024-08-26 RX ORDER — LIDOCAINE HYDROCHLORIDE 10 MG/ML
INJECTION, SOLUTION EPIDURAL; INFILTRATION; INTRACAUDAL; PERINEURAL AS NEEDED
Status: DISCONTINUED | OUTPATIENT
Start: 2024-08-26 | End: 2024-08-26

## 2024-08-26 RX ORDER — CEFAZOLIN SODIUM 2 G/100ML
2 INJECTION, SOLUTION INTRAVENOUS ONCE
Status: COMPLETED | OUTPATIENT
Start: 2024-08-26 | End: 2024-08-26

## 2024-08-26 RX ORDER — FENTANYL CITRATE 50 UG/ML
50 INJECTION, SOLUTION INTRAMUSCULAR; INTRAVENOUS EVERY 5 MIN PRN
Status: DISCONTINUED | OUTPATIENT
Start: 2024-08-26 | End: 2024-08-26 | Stop reason: HOSPADM

## 2024-08-26 RX ORDER — LIDOCAINE HYDROCHLORIDE 20 MG/ML
JELLY TOPICAL AS NEEDED
Status: DISCONTINUED | OUTPATIENT
Start: 2024-08-26 | End: 2024-08-26 | Stop reason: HOSPADM

## 2024-08-26 RX ORDER — ALBUTEROL SULFATE 0.83 MG/ML
2.5 SOLUTION RESPIRATORY (INHALATION) ONCE AS NEEDED
Status: DISCONTINUED | OUTPATIENT
Start: 2024-08-26 | End: 2024-08-26 | Stop reason: HOSPADM

## 2024-08-26 RX ORDER — MEPERIDINE HYDROCHLORIDE 25 MG/ML
12.5 INJECTION INTRAMUSCULAR; INTRAVENOUS; SUBCUTANEOUS EVERY 10 MIN PRN
Status: DISCONTINUED | OUTPATIENT
Start: 2024-08-26 | End: 2024-08-26 | Stop reason: HOSPADM

## 2024-08-26 RX ORDER — SODIUM CHLORIDE 0.9 G/100ML
IRRIGANT IRRIGATION AS NEEDED
Status: DISCONTINUED | OUTPATIENT
Start: 2024-08-26 | End: 2024-08-26 | Stop reason: HOSPADM

## 2024-08-26 RX ORDER — PROPOFOL 10 MG/ML
INJECTION, EMULSION INTRAVENOUS AS NEEDED
Status: DISCONTINUED | OUTPATIENT
Start: 2024-08-26 | End: 2024-08-26

## 2024-08-26 RX ORDER — FENTANYL CITRATE 50 UG/ML
INJECTION, SOLUTION INTRAMUSCULAR; INTRAVENOUS AS NEEDED
Status: DISCONTINUED | OUTPATIENT
Start: 2024-08-26 | End: 2024-08-26

## 2024-08-26 RX ORDER — PHENAZOPYRIDINE HYDROCHLORIDE 100 MG/1
100 TABLET, FILM COATED ORAL 3 TIMES DAILY
Qty: 42 TABLET | Refills: 0 | Status: SHIPPED | OUTPATIENT
Start: 2024-08-26 | End: 2024-09-09

## 2024-08-26 RX ORDER — CEPHALEXIN 500 MG/1
500 CAPSULE ORAL 2 TIMES DAILY
Qty: 14 CAPSULE | Refills: 0 | Status: SHIPPED | OUTPATIENT
Start: 2024-08-26 | End: 2024-09-02

## 2024-08-26 RX ORDER — SODIUM CHLORIDE, SODIUM LACTATE, POTASSIUM CHLORIDE, CALCIUM CHLORIDE 600; 310; 30; 20 MG/100ML; MG/100ML; MG/100ML; MG/100ML
100 INJECTION, SOLUTION INTRAVENOUS CONTINUOUS
Status: DISCONTINUED | OUTPATIENT
Start: 2024-08-26 | End: 2024-08-26 | Stop reason: HOSPADM

## 2024-08-26 RX ORDER — HYDRALAZINE HYDROCHLORIDE 20 MG/ML
5 INJECTION INTRAMUSCULAR; INTRAVENOUS EVERY 30 MIN PRN
Status: DISCONTINUED | OUTPATIENT
Start: 2024-08-26 | End: 2024-08-26 | Stop reason: HOSPADM

## 2024-08-26 RX ORDER — ONDANSETRON HYDROCHLORIDE 2 MG/ML
4 INJECTION, SOLUTION INTRAVENOUS ONCE AS NEEDED
Status: COMPLETED | OUTPATIENT
Start: 2024-08-26 | End: 2024-08-26

## 2024-08-26 RX ORDER — MIDAZOLAM HYDROCHLORIDE 1 MG/ML
1 INJECTION, SOLUTION INTRAMUSCULAR; INTRAVENOUS ONCE AS NEEDED
Status: DISCONTINUED | OUTPATIENT
Start: 2024-08-26 | End: 2024-08-26 | Stop reason: HOSPADM

## 2024-08-26 RX ORDER — LIDOCAINE HYDROCHLORIDE 10 MG/ML
0.1 INJECTION, SOLUTION EPIDURAL; INFILTRATION; INTRACAUDAL; PERINEURAL ONCE
Status: DISCONTINUED | OUTPATIENT
Start: 2024-08-26 | End: 2024-08-26 | Stop reason: HOSPADM

## 2024-08-26 RX ORDER — ROCURONIUM BROMIDE 10 MG/ML
INJECTION, SOLUTION INTRAVENOUS AS NEEDED
Status: DISCONTINUED | OUTPATIENT
Start: 2024-08-26 | End: 2024-08-26

## 2024-08-26 RX ORDER — ONDANSETRON HYDROCHLORIDE 2 MG/ML
INJECTION, SOLUTION INTRAVENOUS AS NEEDED
Status: DISCONTINUED | OUTPATIENT
Start: 2024-08-26 | End: 2024-08-26

## 2024-08-26 SDOH — HEALTH STABILITY: MENTAL HEALTH: CURRENT SMOKER: 0

## 2024-08-26 ASSESSMENT — PAIN SCALES - GENERAL
PAINLEVEL_OUTOF10: 7
PAINLEVEL_OUTOF10: 3
PAINLEVEL_OUTOF10: 0 - NO PAIN
PAIN_LEVEL: 2
PAINLEVEL_OUTOF10: 0 - NO PAIN
PAINLEVEL_OUTOF10: 7
PAINLEVEL_OUTOF10: 3
PAINLEVEL_OUTOF10: 7
PAINLEVEL_OUTOF10: 7
PAINLEVEL_OUTOF10: 0 - NO PAIN
PAINLEVEL_OUTOF10: 3

## 2024-08-26 ASSESSMENT — PAIN - FUNCTIONAL ASSESSMENT
PAIN_FUNCTIONAL_ASSESSMENT: 0-10
PAIN_FUNCTIONAL_ASSESSMENT: 0-10
PAIN_FUNCTIONAL_ASSESSMENT: WONG-BAKER FACES
PAIN_FUNCTIONAL_ASSESSMENT: 0-10

## 2024-08-26 ASSESSMENT — COLUMBIA-SUICIDE SEVERITY RATING SCALE - C-SSRS
1. IN THE PAST MONTH, HAVE YOU WISHED YOU WERE DEAD OR WISHED YOU COULD GO TO SLEEP AND NOT WAKE UP?: NO
2. HAVE YOU ACTUALLY HAD ANY THOUGHTS OF KILLING YOURSELF?: NO
6. HAVE YOU EVER DONE ANYTHING, STARTED TO DO ANYTHING, OR PREPARED TO DO ANYTHING TO END YOUR LIFE?: NO

## 2024-08-26 NOTE — ANESTHESIA PROCEDURE NOTES
Airway  Date/Time: 8/26/2024 9:53 AM  Urgency: elective    Airway not difficult    Staffing  Performed: CRNA   Authorized by: Guillermo Rowe MD    Performed by: SAGRARIO Kim-AMI  Patient location during procedure: OR    Indications and Patient Condition  Indications for airway management: anesthesia  Spontaneous Ventilation: absent  Sedation level: deep  Preoxygenated: yes  Patient position: sniffing  MILS maintained throughout  Mask difficulty assessment: 1 - vent by mask    Final Airway Details  Final airway type: endotracheal airway      Successful airway: ETT  Cuffed: yes   Successful intubation technique: direct laryngoscopy  Facilitating devices/methods: intubating stylet  Endotracheal tube insertion site: oral  Blade: Giles  Blade size: #4  ETT size (mm): 7.5  Cormack-Lehane Classification: grade IIa - partial view of glottis  Placement verified by: capnometry   Measured from: lips  Number of attempts at approach: 2           Plan:  Continue current medications   Check blood pressure at home daily.  You can take losartan if your top number of your blood pressure is over 150  Compression stockings for swelling and elevate feet when possible   Regular exercise and following a healthy diet encouraged   Follow up with EP in December   Follow up with me as needed

## 2024-08-26 NOTE — ANESTHESIA POSTPROCEDURE EVALUATION
"Patient: Gaurav Fischer \"Rich\"    Procedure Summary       Date: 08/26/24 Room / Location: [facility] / [facility]    Anesthesia Start: 0942 Anesthesia Stop: 1141    Procedure: HoLEP (Morcellator, HoLEP set , P120) Diagnosis:       Urinary frequency      Benign prostatic hyperplasia with urinary frequency      (Urinary frequency [R35.0])      (Benign prostatic hyperplasia with urinary frequency [N40.1, R35.0])    Surgeons: Faby Tejada MD Responsible Provider: Guillermo Rowe MD    Anesthesia Type: general ASA Status: 2            Anesthesia Type: general    Vitals Value Taken Time   /83 08/26/24 1255   Temp 36 °C (96.8 °F) 08/26/24 1140   Pulse 55 08/26/24 1255   Resp 16 08/26/24 1255   SpO2 95 % 08/26/24 1255       Anesthesia Post Evaluation    Patient location during evaluation: PACU  Patient participation: complete - patient participated  Level of consciousness: sleepy but conscious  Pain score: 2  Pain management: adequate  Multimodal analgesia pain management approach  Airway patency: patent  Cardiovascular status: acceptable  Respiratory status: acceptable  Hydration status: acceptable  Postoperative Nausea and Vomiting: none        There were no known notable events for this encounter.    "

## 2024-08-26 NOTE — DISCHARGE INSTRUCTIONS
Diet  You can eat whatever you like after your surgery. Sometimes the anesthesia can cause nausea, so it may be a good idea to stay away from heavy foods right after you get home from the procedure.    Holden catheter  You will have a tube in the bladder called a holden catheter. This drains urine from the bladder and exits the penis. Be sure the catheter is well secured to the leg at all times. This catheter typically stays in from one day to a week (7 days) depending on your circumstances. There should never be any tension or tugging on the catheter. Take care not to pull on the catheter when rolling in bed or changing position. The nurses will show you how to attach the holden catheter to a leg bag during the day and a big bag at night.    The holden catheter has a balloon on the end of it to keep it in place in the bladder. This may give you the feeling you need to urinate. Be assured the catheter is draining and the sensation is from the holden catheter balloon. You may also notice urine or blood-tinged urine leaking around the catheter out the tip of the penis. Typically, this due to a bladder spasm and is not a cause for alarm. If the catheter stops draining, get up and walk around. If it is still not draining, call the office or come to the emergency room as it may be clogged and need to be irrigated. Once the holden catheter is removed, it is normal to have burning and stinging with urination for a few weeks after surgery. It is also common to have more frequent urination and a greater sense of the urge to urinate. There may not be much warning from the time you feel the urge to urinate to the time when the bladder is ready to empty.    Activity  It is very important to walk after your procedure. Walking prevents blood clots in the legs or lungs. You may go up and down stairs. Avoid any strenuous activity or lifting more than ten pounds for 3 to 4 weeks. This includes any heavy lifting, running, riding a bicycle  or golf. This also includes activities such as raking leaves, mowing the lawn, shoveling snow or other strenuous chores. If you see blood in the urine, increase the amount of water you are drinking and avoid strenuous activity or heavy lifting until the blood clears.    Medications  Take the medications prescribed at the time of your discharge from the hospital. If you are taking any medications on a regular basis prior to your admission to the hospital, you should continue to take those as well. For any aches, pains or headaches, you may use Tylenol or Extra-Strength Tylenol. Sometimes, you will be given a prescription for other pain killers. Do not use any aspirin or aspirin-like compounds or ibuprofen products (NSAIDs) (eg. Advil, Nuprin, Motrin, Bufferin, etc.) for four weeks after surgery. If you start aspirin or aspirin like compounds and you notice blood in your urine, please stop taking it and increase your water intake. Pyridium will be called in that will help with burning.  It will color your urine orange.  Antibiotic will be also prescribed for 1 week.    Avoid constipation  Anesthesia, surgery and narcotic pain medication all increase your risk for constipation. Do not strain to move the bowels as this can impair the healing process and start bleeding. Take plenty of fiber, water and over the counter stool softener to avoid constipation. Stool softener can be taken by mouth twice a day to avoid constipation. A stool softener or laxative is available at any drug store without a prescription (senna or Senokot or SennaGen, Dulcolax or bisacodyl, Miralax, Metamucil, Milk of Magnesia or magnesium hydroxide). Decrease or hold the stool softener for diarrhea or loose stools.    Follow up plan  If you develop a fever greater than 101 degrees Fahrenheit, the catheter stops draining or you are unable to urinate, call the office or come to the emergency room.  Your catheter removal has been scheduled  Please call  [phone] and follow prompts for Dr. Tejada's  if you are not sure of your appointment time or location

## 2024-08-26 NOTE — ANESTHESIA PREPROCEDURE EVALUATION
"Patient: Gaurav Fischer \"Rich\"    Procedure Information       Date/Time: 08/26/24 0900    Procedure: HoLEP (Morcellator, HoLEP set , P120)    Location: [facility] / [facility]    Surgeons: Faby Tejada MD            Relevant Problems   Cardiac   (+) Other hyperlipidemia   (+) Primary hypertension      Pulmonary   (+) Dyspnea on exertion   (+) Obstructive sleep apnea of adult      GI   (+) Esophageal reflux      /Renal   (+) BPH with obstruction/lower urinary tract symptoms   (+) Benign prostatic hyperplasia with urinary frequency      Liver   (+) Alpha-1-antitrypsin deficiency (Multi)   (+) Gallstones      Endocrine   (+) Acquired hypothyroidism   (+) Medullary thyroid carcinoma (Multi)      Hematology   (+) Anemia, deficiency      Musculoskeletal   (+) Chronic left-sided low back pain with left-sided sciatica   (+) Chronic low back pain   (+) Primary osteoarthritis of lumbar spine      HEENT   (+) Chronic ethmoidal sinusitis   (+) Chronic maxillary sinusitis       Clinical information reviewed:   Tobacco  Allergies  Meds   Med Hx  Surg Hx   Fam Hx  Soc Hx        NPO Detail:  NPO/Void Status  Date of Last Liquid: 08/25/24  Time of Last Liquid: 2200  Date of Last Solid: 08/25/24  Time of Last Solid: 2200  Time of Last Void: 0730         Physical Exam    Airway  Mallampati: II  TM distance: >3 FB  Neck ROM: full     Cardiovascular - normal exam     Dental - normal exam     Pulmonary - normal exam     Abdominal - normal exam             Anesthesia Plan    History of general anesthesia?: yes  History of complications of general anesthesia?: no    ASA 2     general     The patient is not a current smoker.  Patient was not previously instructed to abstain from smoking on day of procedure.  Patient did not smoke on day of procedure.  Education provided regarding risk of obstructive sleep apnea.  intravenous induction   Postoperative administration of opioids is intended.  Trial extubation is " planned.  Anesthetic plan and risks discussed with patient.  Use of blood products discussed with patient who consented to blood products.    Plan discussed with CAA, attending and CRNA.

## 2024-08-26 NOTE — OP NOTE
"HoLEP (Morcellator, HoLEP set , P120) Operative Note     Date: 2024  OR Location: [facility]    Name: Gaurav Fischer \"Emily", : 1950, Age: 74 y.o., MRN: [id number], Sex: male    Diagnosis  Pre-op Diagnosis      * Urinary frequency [R35.0]     * Benign prostatic hyperplasia with urinary frequency [N40.1, R35.0] Post-op Diagnosis     * Urinary frequency [R35.0]     * Benign prostatic hyperplasia with urinary frequency [N40.1, R35.0]     Procedures  HoLEP (Morcellator, HoLEP set , P120)  62459 - [place] LASER ENUCLEATION PROSTATE W/MORCELLATION      Surgeons      * Faby Tejada - Primary    Resident/Fellow/Other Assistant:  Surgeons and Role:  * No surgeons found with a matching role *    Procedure Summary  Anesthesia: General  ASA: II  Anesthesia Staff: Anesthesiologist: Guillermo Rowe MD  CRNA: BILLY Kim  Estimated Blood Loss: 5mL  Intra-op Medications: Administrations occurring from 0900 to 1100 on 24:  * No intraprocedure medications in log *           Anesthesia Record               Intraprocedure I/O Totals       None           Specimen: No specimens collected     Staff:            Drains and/or Catheters: * None in log *    Tourniquet Times:         Implants:     Findings: BPH    Indications: Sidney Fischer is an 74 y.o. male who is having surgery for Urinary frequency [R35.0]  Benign prostatic hyperplasia with urinary frequency [N40.1, R35.0].     The patient was seen in the preoperative area. The risks, benefits, complications, treatment options, non-operative alternatives, expected recovery and outcomes were discussed with the patient. The possibilities of reaction to medication, pulmonary aspiration, injury to surrounding structures, bleeding, recurrent infection, the need for additional procedures, failure to diagnose a condition, and creating a complication requiring transfusion or operation were discussed with the patient. The patient concurred with the proposed plan, giving " informed consent.  The site of surgery was properly noted/marked if necessary per policy. The patient has been actively warmed in preoperative area. Preoperative antibiotics have been ordered and given within 1 hours of incision. Venous thrombosis prophylaxis have been ordered including bilateral sequential compression devices    Procedure Details: Preoperative diagnosis: BPH with LUTs    Postoperative diagnosis: same    Procedure: Holmium laser enucleation of prostate and tissue morcellation    Anesthesia: general    IVF: see anesthesia report    EBL: 5 ml    Complications: none    Catheters: 22 Fr 3-way Luis catheter    Specimen: prostate chips    Disposition: PACU in stable condition       Enucleation time: 22  Total laser time: 39  Total energy used: 196,690       Patient is a 74 year-old male with significant obstructive and irritative voiding symptoms not responsive to maximal medical therapy.  Ambulatory evaluation demonstrated him to be a good candidate for HoLEP procedure.  Risks and alternatives were discussed in great detail, he singed the informed consent and agreed to proceed    50 ml of lubricant were injected to the urethra.  Urethral meatus was dilated with repeat sounds to 30 Fr caliber    A 26 [place] resectoscope was inserted.  The anterior urethra was normal, there was good coaptation of the membranous urethra, there was a large obstructing prostate.  The bladder was normal without stones or lesions.  Both ureteral orifices were identified.    We started the enucleation using a 550 µm holmium laser fiber.    A circumferential incision was made in the urethra proximal to the sphincter.  It was deepened anteriorly and laterally.  We then entered the space between the capsule and the adenoma bluntly lateral to the verumontanum.  This was done bilaterally.  The posterior plane was developed bilaterally and connected by cutting the fibers proximal to the verumontanum.  The resection was carried  as far proximally as possible.    We then returned to the initial incision in the urethra and detached the lateral attachments between the sphincter and the adenoma.  We were able to identify the lateral plane and developed it as proximally as possible by connecting it to the posterior plane.    We then turned to free the anterior portion of the sphincter.  The attachments between the sphincter and the anterior adenoma were taken down with the laser fiber until we met the anterior plan.  We then connected all planes circumferentially.  We continued the enucleation circumferentially until we reached the bladder neck.  The bladder neck was entered anteriorly and good hemostasis was obtained.  Then the bladder neck incision was developed circumferentially around the adenoma.  Adenoma was then rolled into the bladder and residual posterior attachments were removed.    Hemostasis was performed.The laser bridge was removed and the nephroscope loaded with the Piranha morcellator was inserted.  2 irrigations were connected to distended bladder.  Tissue was completely morcellated.    The laser bridge was inserted again and meticulous hemostasis was performed.  I verified that there was no residual tissue in the bladder, and that ureteral orifices were free.    Persistent oozing was observed.  Bipolar loop resectoscope was then inserted and fulguration of fossa was performed.  Large floating adenoma fragment was seen and resected and flushed out of the bladder.    This added extra 45 minutes to the procedure.    The laser apparatus was removed and a 22 [place] three-way catheter was inserted and connected to irrigation.    Patient was extubated and moved to the postoperative area in stable condition.         Disposition: patient will have a trial of void on postoperative day one    Complications:  None; patient tolerated the procedure well.    Disposition: PACU - hemodynamically stable.  Condition: stable         Additional  Details: Complex modifier 22 due to extra time for hemostasis and residual adenoma resection    Attending Attestation: I was present and scrubbed for the entire procedure.    Faby Tejada  Phone Number: [phone]

## 2024-08-27 ENCOUNTER — APPOINTMENT (OUTPATIENT)
Dept: UROLOGY | Facility: CLINIC | Age: 74
End: 2024-08-27
Payer: MEDICARE

## 2024-08-27 VITALS — TEMPERATURE: 97.7 F

## 2024-08-27 DIAGNOSIS — R35.0 BENIGN PROSTATIC HYPERPLASIA WITH URINARY FREQUENCY: Primary | ICD-10-CM

## 2024-08-27 DIAGNOSIS — N40.1 BENIGN PROSTATIC HYPERPLASIA WITH URINARY FREQUENCY: Primary | ICD-10-CM

## 2024-08-27 PROCEDURE — 1123F ACP DISCUSS/DSCN MKR DOCD: CPT | Performed by: PHYSICIAN ASSISTANT

## 2024-08-27 PROCEDURE — 1036F TOBACCO NON-USER: CPT | Performed by: PHYSICIAN ASSISTANT

## 2024-08-27 PROCEDURE — 51700 IRRIGATION OF BLADDER: CPT | Performed by: PHYSICIAN ASSISTANT

## 2024-08-27 PROCEDURE — 1159F MED LIST DOCD IN RCRD: CPT | Performed by: PHYSICIAN ASSISTANT

## 2024-08-27 PROCEDURE — 99024 POSTOP FOLLOW-UP VISIT: CPT | Performed by: PHYSICIAN ASSISTANT

## 2024-08-27 ASSESSMENT — PAIN SCALES - GENERAL: PAINLEVEL_OUTOF10: 0 - NO PAIN

## 2024-08-27 NOTE — PROGRESS NOTES
"Subjective   Patient ID: Gaurav Fischer \"Sidney\" is a 74 y.o. male who presents for Post-op Follow-up (TOV).  HPI  Patient is a 75 yo male with BPH s/p HoLEP with Dr Tejada 8/26/24 presents for trial of void.    Patient denies any complication. He denies fever or chills    Voiding trial passed    Review of Systems    Objective   Physical Exam    Assessment/Plan     status post HolEP on 8/26/24 with Dr. Tejada    Reviewed normal expectations and restrictions after surgery. Please limit heavy activity as this can increase your risk for bleeding (running, cycling, lifting, riding a ). Avoid constipation. Do not have relations for the next two weeks. Initiate kegel exercises    Advised that you may have burning but that if it seems to resolve and then return to call the office. The pyridium that you were prescribed can be taken if you continue to have burning. However if you burning stops and restarts please call the office so that we can rule out an infection Please do not have relations for the next two weeks.      I advised increasing fluid intake to reduce irritation.  If patient is not able to urinate I advised earlier follow-up or to go to the ER for Luis catheter placement.         Praful Madrid PA-C 08/27/24 10:41 AM   "

## 2024-08-27 NOTE — PROGRESS NOTES
Trial of Void:    Patient here today for trial void. Patient verifed by name/.   Patient tolerated 350 cc of sterile water.  Catheter was removed and patient voided 450 cc.   Patient instructed to give the office a call with and issues or concerns, patient also instructed go to emergency room after office hours if unable to void.    LONI Greenberg

## 2024-08-29 DIAGNOSIS — I10 PRIMARY HYPERTENSION: ICD-10-CM

## 2024-08-29 DIAGNOSIS — I10 ESSENTIAL (PRIMARY) HYPERTENSION: ICD-10-CM

## 2024-08-29 RX ORDER — AMLODIPINE BESYLATE 5 MG/1
5 TABLET ORAL DAILY
Qty: 90 TABLET | Refills: 1 | Status: SHIPPED | OUTPATIENT
Start: 2024-08-29

## 2024-08-30 LAB
LABORATORY COMMENT REPORT: NORMAL
PATH REPORT.FINAL DX SPEC: NORMAL
PATH REPORT.GROSS SPEC: NORMAL
PATH REPORT.MICROSCOPIC SPEC OTHER STN: NORMAL
PATH REPORT.RELEVANT HX SPEC: NORMAL
PATH REPORT.TOTAL CANCER: NORMAL

## 2024-09-16 ENCOUNTER — OFFICE VISIT (OUTPATIENT)
Dept: CARDIOLOGY | Facility: CLINIC | Age: 74
End: 2024-09-16
Payer: MEDICARE

## 2024-09-16 ENCOUNTER — ANCILLARY PROCEDURE (OUTPATIENT)
Dept: CARDIOLOGY | Facility: CLINIC | Age: 74
End: 2024-09-16
Payer: MEDICARE

## 2024-09-16 VITALS
BODY MASS INDEX: 28.92 KG/M2 | HEART RATE: 68 BPM | WEIGHT: 202 LBS | RESPIRATION RATE: 18 BRPM | HEIGHT: 70 IN | DIASTOLIC BLOOD PRESSURE: 76 MMHG | OXYGEN SATURATION: 97 % | SYSTOLIC BLOOD PRESSURE: 124 MMHG

## 2024-09-16 DIAGNOSIS — R00.1 BRADYCARDIA, UNSPECIFIED: ICD-10-CM

## 2024-09-16 DIAGNOSIS — R93.1 ELEVATED CORONARY ARTERY CALCIUM SCORE: ICD-10-CM

## 2024-09-16 DIAGNOSIS — E78.49 OTHER HYPERLIPIDEMIA: ICD-10-CM

## 2024-09-16 DIAGNOSIS — R00.2 PALPITATIONS: ICD-10-CM

## 2024-09-16 DIAGNOSIS — I10 PRIMARY HYPERTENSION: Primary | ICD-10-CM

## 2024-09-16 PROCEDURE — 3078F DIAST BP <80 MM HG: CPT | Performed by: INTERNAL MEDICINE

## 2024-09-16 PROCEDURE — 93005 ELECTROCARDIOGRAM TRACING: CPT

## 2024-09-16 PROCEDURE — 1159F MED LIST DOCD IN RCRD: CPT | Performed by: INTERNAL MEDICINE

## 2024-09-16 PROCEDURE — 99213 OFFICE O/P EST LOW 20 MIN: CPT | Performed by: INTERNAL MEDICINE

## 2024-09-16 PROCEDURE — G2211 COMPLEX E/M VISIT ADD ON: HCPCS | Performed by: INTERNAL MEDICINE

## 2024-09-16 PROCEDURE — 3074F SYST BP LT 130 MM HG: CPT | Performed by: INTERNAL MEDICINE

## 2024-09-16 PROCEDURE — 3008F BODY MASS INDEX DOCD: CPT | Performed by: INTERNAL MEDICINE

## 2024-09-16 PROCEDURE — 1123F ACP DISCUSS/DSCN MKR DOCD: CPT | Performed by: INTERNAL MEDICINE

## 2024-09-16 ASSESSMENT — ENCOUNTER SYMPTOMS
WHEEZING: 0
COUGH: 0
DECREASED APPETITE: 0
BLURRED VISION: 0
EYE DISCHARGE: 0
DIZZINESS: 0
EYE PAIN: 0
DYSPNEA ON EXERTION: 0
ANOREXIA: 0
JOINT SWELLING: 0
BOWEL INCONTINENCE: 0
ORTHOPNEA: 0
DOUBLE VISION: 0
CHILLS: 0
DECREASED LIBIDO: 0
CHANGE IN BOWEL HABIT: 0
CLAUDICATION: 0
SHORTNESS OF BREATH: 0
LIGHT-HEADEDNESS: 0
FALLS: 0
HEADACHES: 0
PSYCHIATRIC NEGATIVE: 1
ABDOMINAL PAIN: 0
LOSS OF BALANCE: 0
IRREGULAR HEARTBEAT: 1
FEVER: 0
PND: 0
PALPITATIONS: 0
DIAPHORESIS: 0
HEMOPTYSIS: 0
DEPRESSION: 0
BLOATING: 0
NEAR-SYNCOPE: 0
ENDOCRINE NEGATIVE: 1
SPUTUM PRODUCTION: 0
BACK PAIN: 0

## 2024-09-16 NOTE — PROGRESS NOTES
"Chief Complaint:   Follow-up and Hypertension     History Of Present Illness:    Sidney Fischer is a 74 y.o. male presenting with a pertinent medical history notable for essential hypertension, BPH, alpha 1 antitrypsin, dy/sl/ipidemia, dyspnea on exertion, gastroesophageal reflux disease, hypersomnia with obstructive sleep apnea, history of medullary t//hyroid carcinoma who presents to cardiology for follow up of of heart palpitations and presyncope and dyslipidemia with elevated CAC      His history is rather convoluted. Recall   --States that he will have intermittent periods of sensations of a \"pounding heart\" that usually occur in the evening. He notes that these usually occur after day of heavy exertion, such as shoveling snow, moving 50 pound bags of lawn care materials, or watching baseball games in the sun. He reports that he has no chest discomfort, palpitations, heaviness, dyspnea exertion during these episodes, usually occurs right when he is about to go to bed later that night.   He also reports intermittent periods of presyncope, lightheadedness, dizziness that occur while he is in [employer]. States that his symptoms will somewhat chanel when he is able to sit back down. Further, he is very concerned about \"circulation problems\" in his hands and arms. He reports intermittent numbness, tingling as well as change in temperature of his hands. Additionally, he is concerned about \"things building up in my heart\" and he has been doing \"a lot of research about coronary calcium and wonders whether he should have this done\"      Since he was last seen, he reports that many of his symptoms have improved following reduction his his thyroid replacement.Otherwise, no issues. He feels dramatically improved. He has been following his cholesterol levels, and is shocked at how well he is responding to his current medication. He reports no myalgias, arthralgias.     Today ( 2/6/2023) he returns for scheduled follow-up. He " "reports that his dizziness has improved, feels that this was related to his levothyroxine dose. He notes that he has not had any issues with heart palpitations. He has been shooting pool to stay active., goes square dancing every other weekend. States that he had been out of breath several years ago, but no longer. He is able to keep up and enjoys it.     10/30/2023 -- He returns for follow up. He had been doing quiet well until about 1 week prior. He had an episode of heart palpitations where he heart was \"Pounding\" and \"Slow\"  This occurred about the time of COVID vaccine. He notes that he had been in [employer] / [employer] and walked ~ 7 miles daily. He was able to climb 1000' ascent ( 5500 --> 6500 feet of elevation) without chest pressure or pain, shortness of breath. [facility] had [facility] where his cholesterol panel was obtained showing very acceptable levels with TC < 120. Its is surprising that it wasn't uploaded to his chart.  He is otherwise doing well.     9/16/2024 -- He returns for Follow up.  Since he was last seen, he has continued to do well.  He has been active.  He has gotten out of golf 2 rounds, and feels good about this.  He continues to exercise, and denies any shortness of breath, chest pain, heart palpitations with exercise.  He feels that he is slowing down some but otherwise offers no cardiovascular complaints.    Patient denies chest pain and angina.  Pt denies shortness of breath, dyspnea on exertion, orthopnea, and paroxysmal nocturnal dyspnea.  Pt denies worsening lower extremity edema.  Pt denies palpitations or syncope.  No recent falls.  No fever or chills.  No cough.  No change in bowel or bladder habits.  No travel.  No sick contacts.  No recent travel    12 point review of systems was performed and is otherwise negative.  Past medical history:  As above.  Medications:  Reviewed.  Allergies:  Reviewed.  Social history:    Social History     Tobacco " "Use    Smoking status: Former     Types: Cigars    Smokeless tobacco: Never   Vaping Use    Vaping status: Never Used   Substance Use Topics    Alcohol use: Yes     Alcohol/week: 3.0 standard drinks of alcohol     Types: 3 Cans of beer per week     Comment: 1/2 BEER OR GLASS OF WINE NIGHTLY    Drug use: Never       Family history:    Family History   Problem Relation Name Age of Onset    Breast cancer Mother      Other (mesothelioma) Father      Breast cancer Sister      Diabetes type II Sister                         Review of Systems   Constitutional: Negative for chills, decreased appetite, diaphoresis and fever.   HENT:  Negative for congestion, ear discharge, ear pain and hearing loss.    Eyes:  Negative for blurred vision, discharge, double vision and pain.   Cardiovascular:  Positive for irregular heartbeat. Negative for chest pain, claudication, cyanosis, dyspnea on exertion, leg swelling, near-syncope, orthopnea, palpitations and paroxysmal nocturnal dyspnea.   Respiratory:  Negative for cough, hemoptysis, shortness of breath, sputum production and wheezing.    Endocrine: Negative.    Skin: Negative.    Musculoskeletal:  Negative for arthritis, back pain, falls, joint pain, joint swelling and muscle weakness.   Gastrointestinal:  Negative for bloating, abdominal pain, anorexia, change in bowel habit and bowel incontinence.   Genitourinary:  Negative for bladder incontinence and decreased libido.   Neurological:  Negative for dizziness, headaches, light-headedness and loss of balance.   Psychiatric/Behavioral: Negative.           Last Recorded Vitals:  Vitals:    09/16/24 1157   BP: 124/76   BP Location: Right arm   Patient Position: Sitting   BP Cuff Size: Adult   Pulse: 68   Resp: 18   SpO2: 97%   Weight: 91.6 kg (202 lb)   Height: 1.765 m (5' 9.5\")         Past Medical History:  He has a past medical history of Dependence on other enabling machines and devices, Encounter for general adult medical " examination without abnormal findings (07/12/2019), Hip pain, acute, right, Low back pain, and Personal history of other endocrine, nutritional and metabolic disease (03/03/2015).    Past Surgical History:  He has a past surgical history that includes Colonoscopy (03/03/2015); Appendectomy (03/03/2015); Tonsillectomy (03/03/2015); Sinus surgery (03/03/2015); Hernia repair (03/03/2015); Other surgical history (02/17/2017); Other surgical history (04/08/2021); and Total thyroidectomy (01/05/2022).      Social History:  He reports that he has quit smoking. His smoking use included cigars. He has never used smokeless tobacco. He reports current alcohol use of about 3.0 standard drinks of alcohol per week. He reports that he does not use drugs.    Family History:  Family History   Problem Relation Name Age of Onset    Breast cancer Mother      Other (mesothelioma) Father      Breast cancer Sister      Diabetes type II Sister          Allergies:  Sulfamethoxazole-trimethoprim, Griseofulvin, and Pollen extracts    Outpatient Medications:  Current Outpatient Medications   Medication Instructions    amLODIPine (NORVASC) 5 mg, oral, Daily    cetirizine (ZYRTEC) 10 mg, oral, As needed    desoximetasone (Topicort) 0.25 % cream Desoximetasone 0.25 % External Cream APPLY AND GENTLY MASSAGE INTO AFFECTED AREA(S) TWICE DAILY. as needed Refills: 0 Start : 26-Oct-2016 Active    famotidine (PEPCID) 20 mg, oral, Daily    hydrocortisone 1 % ointment Topical, As needed    levothyroxine (SYNTHROID, LEVOXYL) 112 mcg, oral, Every morning, Take on empty stomach    lovastatin (MEVACOR) 40 mg, oral, Nightly    MELATONIN ORAL 1 mg, oral, Take as directed    neomycin-polymyxin-pramoxine (Neosporin) 3.5-10,000-10 mg-unit-mg/gram cream Topical    tadalafil (CIALIS) 10 mg, oral, As needed    triamcinolone (Nasacort) 55 mcg nasal inhaler nasal       Physical Exam:  /76 (BP Location: Right arm, Patient Position: Sitting, BP Cuff Size: Adult)    "Pulse 68   Resp 18   Ht 1.765 m (5' 9.5\")   Wt 91.6 kg (202 lb)   SpO2 97%   BMI 29.40 kg/m²     General Appearance:  Alert, cooperative, no distress, appears stated age   Head:  Normocephalic, without obvious abnormality, atraumatic   Eyes:  PERRL, cboth eyes   Ears:  Normal ears   Nose: Nares normal, septum midline, mucosa normal, no drainage or sinus tenderness   Throat: Lips, mucosa, and tongue normal; teeth and gums normal   Neck: Supple,  no carotid bruit or JVD   Back:   Symmetric, no curvature, ROM normal, no CVA tenderness   Lungs:   Clear to auscultation bilaterally, respirations unlabored   Chest Wall:  No tenderness or deformity   Heart:  Regular rate and rhythm, S1, S2 normal, no murmur, rub or gallop   Abdomen:   Soft, non-tender, bowel sounds active all four quadrants,  no masses, no organomegaly   Extremities: Extremities normal, atraumatic, no cyanosis or edema   Pulses: 2+ and symmetric   Skin: Skin color, texture, turgor normal, no rashes or lesions   Lymph nodes: Cervical, supraclavicular, and axillary nodes normal   Neurologic: Normal          Last Labs:  CBC -  Lab Results   Component Value Date    WBC 5.1 08/14/2024    HGB 11.5 (L) 08/14/2024    HCT 35.5 (L) 08/14/2024    MCV 83 08/14/2024     08/14/2024       CMP -  Lab Results   Component Value Date    CALCIUM 8.9 08/14/2024    PHOS 3.4 02/06/2023    PROT 7.3 01/19/2024    ALBUMIN 4.8 01/19/2024    AST 26 01/19/2024    ALT 19 01/19/2024    ALKPHOS 70 01/19/2024    BILITOT 0.6 01/19/2024       LIPID PANEL -   Lab Results   Component Value Date    CHOL 156 01/19/2024    TRIG 82 01/19/2024    HDL 45.6 01/19/2024    CHHDL 3.4 01/19/2024    LDLF 69 07/14/2023    VLDL 16 01/19/2024    NHDL 110 01/19/2024       RENAL FUNCTION PANEL -   Lab Results   Component Value Date    GLUCOSE 93 08/14/2024     08/14/2024    K 4.5 08/14/2024     08/14/2024    CO2 25 08/14/2024    ANIONGAP 12 08/14/2024    ANIONGAP 11 01/19/2024    BUN 18 " 08/14/2024    CREATININE 1.10 08/14/2024    GFRMALE 71 07/14/2023    CALCIUM 8.9 08/14/2024    PHOS 3.4 02/06/2023    ALBUMIN 4.8 01/19/2024        Lab Results   Component Value Date    HGBA1C 5.6 05/19/2022       Last Cardiology Tests:  ECG:  In Office EKG       Echo:  05/2021   1. The left ventricular systolic function is normal with a 60-65% estimated ejection fraction.   2. Borderline increased LV mass.   3. Moderately increased left ventricular septal thickness.   4. RVSP within normal limits.      Cardiac Imaging:  CT HEART CALCIUM SCORING WO IV CONTRAST 2/13/2023  FINDINGS:  The score and distribution of calcium in the coronary arteries is as  follows:     .49,  .35,  LCx 0,  RCA 0,     Total   829.84    Lab review: I have personally reviewed the laboratory result(s)   Diagnostic review: I have personally reviewed the result(s) of the EKG and Echocardiogram .   Imaging review: I have  personally reviewed the result(s) CT Coronary Artery Calcium Scoring     Assessment/Plan   Problem List Items Addressed This Visit             ICD-10-CM    Elevated coronary artery calcium score R93.1     The 10-year ASCVD risk score (Gayla ARNETT, et al., 2019) is: 22.6%    Values used to calculate the score:      Age: 74 years      Sex: Male      Is Non- : No      Diabetic: No      Tobacco smoker: No      Systolic Blood Pressure: 124 mmHg      Is BP treated: Yes      HDL Cholesterol: 45.6 mg/dL      Total Cholesterol: 156 mg/dL           Other hyperlipidemia E78.49    Palpitations R00.2    Primary hypertension - Primary I10       Continues to do well from a cardiac standpoint and offers no symptoms   -- Continue current medications at this time  -- Discussed stress testing for CAC and  event monitor for heart palpitations that he experienced.  He wishes to refrain at this time but will let us know if these return or increase.    Quincy Benavides, DO

## 2024-09-16 NOTE — PATIENT INSTRUCTIONS
"It was a pleasure seeing you today. I would like to see you back in clinic in  4  months. You can call my office if questions arise between now and our next visit.     Today, we talked about your overall health   -- I am glad to hear that you went hiking in the mountains and had no difficulty    -- Please resume daily aspirin 81 mg     -- Take your Iron Tablet with a \"Shot\" of orange juice. The Citric Acid helps with the absorption. The other option is to talk with the pharmacy about a \"Slow Release\" Iron supplement to help with the constipation issues  -- Try and use Polyethelyen Glycol ( Go-Lytelye )   -- You an also use a Glycerin Suppository form the bottom as well.    -- Please continue your current medications     -- Let us know if you feel more heart palpitations, if they are longer in duration as we will need to look at an event monitor .     Below are some Heart Health Tips that we provide to all of our patients. I hope you fing them useful.     - If you are having problems with medications, consider looking at the following websites.   --  \"GoodRx\"   --  \"[facility]\"      - We are happy to supply written prescriptions if needed to allow you to obtain your medications from different pharmacies. Additionally, if you are having issues with mail order delivery, please let us know. We can send a limited supply of your medications to your local pharmacy.     -  We recommend you follow a heart healthy diet. Watch food labels and try not to eat more than 2,500 mg of sodium per day. Avoid foods high in salt like processed meats (lunch meats, mullins, and sausage), processed foods (boxed dinners, canned soups), fried and fast foods. Monitor serving sizes and if the sodium per serving size is more than 200 mg, avoid those foods. If the sodium per serving size is between 100-200 mg, you can use those in limited quantities. Try to choose foods where the amount of sodium per serving size is less " than 100 mg. Try to eat a diet rich in fruits and vegetables, whole grains, low fat dairy products, skinless poultry and fish, nuts, beans, non-tropical vegetable oils. Limit saturated fat, trans fat, sodium, red meats, and sugar-sweetened beverages.   Limit alcohol     -The combination of a reduced-calorie diet and increased physical activity is recommended. Adults should aim to get at least 150 minutes of moderate physical activity per week (30 minutes of moderate physical activities at least 5 days per week). Examples of moderate physical activities include brisk walking, swimming, aerobic dancing, heavy gardening, jumping rope, bicycling 10 MPH or faster, tennis, hiking uphill or with a heavy backpack. Please let us know if you would like to learn more about your nutrition and calories and additional options including weight loss programs to help you reach your goal.     -If you smoke, stop smoking. If you stop smoking you can help get rid of a major source of stress to your heart. Smoking makes your heart rate and blood pressure go up and increases your risk or developing cardiovascular diseases and worsen symptoms associated with heart failure.     -Obtain a BP monitor and monitor your BP daily. Check it around the same time each day; at least 1 hour after taking your medications. Record your BP in a log and bring your log with you to your doctors appointment.     -F/u with your PCP as recommended.

## 2024-09-23 NOTE — ASSESSMENT & PLAN NOTE
The 10-year ASCVD risk score (Gayla ARNETT, et al., 2019) is: 22.6%    Values used to calculate the score:      Age: 74 years      Sex: Male      Is Non- : No      Diabetic: No      Tobacco smoker: No      Systolic Blood Pressure: 124 mmHg      Is BP treated: Yes      HDL Cholesterol: 45.6 mg/dL      Total Cholesterol: 156 mg/dL

## 2024-09-26 LAB
ATRIAL RATE: 68 BPM
P AXIS: 6 DEGREES
P OFFSET: 192 MS
P ONSET: 132 MS
PR INTERVAL: 188 MS
Q ONSET: 226 MS
QRS COUNT: 11 BEATS
QRS DURATION: 94 MS
QT INTERVAL: 394 MS
QTC CALCULATION(BAZETT): 418 MS
QTC FREDERICIA: 411 MS
R AXIS: 2 DEGREES
T AXIS: 59 DEGREES
T OFFSET: 423 MS
VENTRICULAR RATE: 68 BPM

## 2024-10-16 DIAGNOSIS — E03.9 HYPOTHYROIDISM, UNSPECIFIED TYPE: ICD-10-CM

## 2024-10-16 DIAGNOSIS — D64.9 ANEMIA, UNSPECIFIED TYPE: Primary | ICD-10-CM

## 2024-10-17 ENCOUNTER — LAB (OUTPATIENT)
Dept: LAB | Facility: LAB | Age: 74
End: 2024-10-17
Payer: MEDICARE

## 2024-10-17 DIAGNOSIS — E03.9 HYPOTHYROIDISM, UNSPECIFIED TYPE: ICD-10-CM

## 2024-10-17 DIAGNOSIS — D64.9 ANEMIA, UNSPECIFIED TYPE: ICD-10-CM

## 2024-10-17 LAB
FERRITIN SERPL-MCNC: 40 NG/ML (ref 20–300)
IRON SATN MFR SERPL: 23 % (ref 25–45)
IRON SERPL-MCNC: 84 UG/DL (ref 35–150)
TIBC SERPL-MCNC: 365 UG/DL (ref 240–445)
TSH SERPL-ACNC: 1.26 MIU/L (ref 0.44–3.98)
UIBC SERPL-MCNC: 281 UG/DL (ref 110–370)

## 2024-10-17 PROCEDURE — 83540 ASSAY OF IRON: CPT

## 2024-10-17 PROCEDURE — 82728 ASSAY OF FERRITIN: CPT

## 2024-10-17 PROCEDURE — 36415 COLL VENOUS BLD VENIPUNCTURE: CPT

## 2024-10-17 PROCEDURE — 84443 ASSAY THYROID STIM HORMONE: CPT

## 2024-10-17 PROCEDURE — 83550 IRON BINDING TEST: CPT

## 2024-11-18 ENCOUNTER — LAB (OUTPATIENT)
Dept: LAB | Facility: LAB | Age: 74
End: 2024-11-18
Payer: MEDICARE

## 2024-11-18 DIAGNOSIS — R00.1 BRADYCARDIA, UNSPECIFIED: ICD-10-CM

## 2024-11-18 DIAGNOSIS — R93.1 ELEVATED CORONARY ARTERY CALCIUM SCORE: ICD-10-CM

## 2024-11-18 DIAGNOSIS — I10 PRIMARY HYPERTENSION: ICD-10-CM

## 2024-11-18 DIAGNOSIS — R00.2 PALPITATIONS: ICD-10-CM

## 2024-11-18 DIAGNOSIS — E78.49 OTHER HYPERLIPIDEMIA: ICD-10-CM

## 2024-11-18 LAB
ALBUMIN SERPL BCP-MCNC: 4.5 G/DL (ref 3.4–5)
ALP SERPL-CCNC: 84 U/L (ref 33–136)
ALT SERPL W P-5'-P-CCNC: 14 U/L (ref 10–52)
ANION GAP SERPL CALC-SCNC: 12 MMOL/L (ref 10–20)
AST SERPL W P-5'-P-CCNC: 18 U/L (ref 9–39)
BASOPHILS # BLD AUTO: 0.04 X10*3/UL (ref 0–0.1)
BASOPHILS NFR BLD AUTO: 0.8 %
BILIRUB SERPL-MCNC: 0.4 MG/DL (ref 0–1.2)
BUN SERPL-MCNC: 29 MG/DL (ref 6–23)
CALCIUM SERPL-MCNC: 8.9 MG/DL (ref 8.6–10.6)
CHLORIDE SERPL-SCNC: 106 MMOL/L (ref 98–107)
CO2 SERPL-SCNC: 25 MMOL/L (ref 21–32)
CREAT SERPL-MCNC: 1.08 MG/DL (ref 0.5–1.3)
EGFRCR SERPLBLD CKD-EPI 2021: 72 ML/MIN/1.73M*2
EOSINOPHIL # BLD AUTO: 0.21 X10*3/UL (ref 0–0.4)
EOSINOPHIL NFR BLD AUTO: 4.3 %
ERYTHROCYTE [DISTWIDTH] IN BLOOD BY AUTOMATED COUNT: 15.6 % (ref 11.5–14.5)
GLUCOSE SERPL-MCNC: 97 MG/DL (ref 74–99)
HCT VFR BLD AUTO: 38.1 % (ref 41–52)
HGB BLD-MCNC: 12.8 G/DL (ref 13.5–17.5)
IMM GRANULOCYTES # BLD AUTO: 0.01 X10*3/UL (ref 0–0.5)
IMM GRANULOCYTES NFR BLD AUTO: 0.2 % (ref 0–0.9)
IRON SATN MFR SERPL: 16 % (ref 25–45)
IRON SERPL-MCNC: 62 UG/DL (ref 35–150)
LYMPHOCYTES # BLD AUTO: 1.59 X10*3/UL (ref 0.8–3)
LYMPHOCYTES NFR BLD AUTO: 32.8 %
MCH RBC QN AUTO: 27.5 PG (ref 26–34)
MCHC RBC AUTO-ENTMCNC: 33.6 G/DL (ref 32–36)
MCV RBC AUTO: 82 FL (ref 80–100)
MONOCYTES # BLD AUTO: 0.48 X10*3/UL (ref 0.05–0.8)
MONOCYTES NFR BLD AUTO: 9.9 %
NEUTROPHILS # BLD AUTO: 2.52 X10*3/UL (ref 1.6–5.5)
NEUTROPHILS NFR BLD AUTO: 52 %
NRBC BLD-RTO: 0 /100 WBCS (ref 0–0)
PLATELET # BLD AUTO: 244 X10*3/UL (ref 150–450)
POTASSIUM SERPL-SCNC: 4.4 MMOL/L (ref 3.5–5.3)
PROT SERPL-MCNC: 6.8 G/DL (ref 6.4–8.2)
RBC # BLD AUTO: 4.66 X10*6/UL (ref 4.5–5.9)
SODIUM SERPL-SCNC: 139 MMOL/L (ref 136–145)
TIBC SERPL-MCNC: 376 UG/DL (ref 240–445)
UIBC SERPL-MCNC: 314 UG/DL (ref 110–370)
WBC # BLD AUTO: 4.9 X10*3/UL (ref 4.4–11.3)

## 2024-11-18 PROCEDURE — 36415 COLL VENOUS BLD VENIPUNCTURE: CPT

## 2024-11-18 PROCEDURE — 83540 ASSAY OF IRON: CPT

## 2024-11-18 PROCEDURE — 80053 COMPREHEN METABOLIC PANEL: CPT

## 2024-11-18 PROCEDURE — 83550 IRON BINDING TEST: CPT

## 2024-11-18 PROCEDURE — 85025 COMPLETE CBC W/AUTO DIFF WBC: CPT

## 2024-11-21 ENCOUNTER — HOSPITAL ENCOUNTER (EMERGENCY)
Facility: HOSPITAL | Age: 74
Discharge: HOME | End: 2024-11-21
Attending: EMERGENCY MEDICINE
Payer: MEDICARE

## 2024-11-21 VITALS
BODY MASS INDEX: 28.92 KG/M2 | RESPIRATION RATE: 15 BRPM | SYSTOLIC BLOOD PRESSURE: 176 MMHG | HEIGHT: 70 IN | DIASTOLIC BLOOD PRESSURE: 107 MMHG | WEIGHT: 202 LBS | HEART RATE: 77 BPM | TEMPERATURE: 97.2 F | OXYGEN SATURATION: 99 %

## 2024-11-21 DIAGNOSIS — M16.12 OSTEOARTHRITIS OF LEFT HIP, UNSPECIFIED OSTEOARTHRITIS TYPE: ICD-10-CM

## 2024-11-21 DIAGNOSIS — M25.552 LEFT HIP PAIN: Primary | ICD-10-CM

## 2024-11-21 DIAGNOSIS — M54.17 LUMBOSACRAL RADICULOPATHY: ICD-10-CM

## 2024-11-21 PROCEDURE — 2500000005 HC RX 250 GENERAL PHARMACY W/O HCPCS: Performed by: EMERGENCY MEDICINE

## 2024-11-21 PROCEDURE — 2500000001 HC RX 250 WO HCPCS SELF ADMINISTERED DRUGS (ALT 637 FOR MEDICARE OP): Performed by: EMERGENCY MEDICINE

## 2024-11-21 PROCEDURE — 99283 EMERGENCY DEPT VISIT LOW MDM: CPT

## 2024-11-21 PROCEDURE — 2500000004 HC RX 250 GENERAL PHARMACY W/ HCPCS (ALT 636 FOR OP/ED): Performed by: EMERGENCY MEDICINE

## 2024-11-21 RX ORDER — LIDOCAINE 50 MG/G
1 PATCH TOPICAL DAILY
Qty: 14 PATCH | Refills: 0 | Status: SHIPPED | OUTPATIENT
Start: 2024-11-21 | End: 2024-12-05

## 2024-11-21 RX ORDER — METHYLPREDNISOLONE 4 MG/1
TABLET ORAL
Qty: 21 TABLET | Refills: 0 | Status: SHIPPED | OUTPATIENT
Start: 2024-11-21 | End: 2024-11-28

## 2024-11-21 RX ORDER — NAPROXEN 250 MG/1
500 TABLET ORAL ONCE
Status: COMPLETED | OUTPATIENT
Start: 2024-11-21 | End: 2024-11-21

## 2024-11-21 RX ORDER — GABAPENTIN 300 MG/1
300 CAPSULE ORAL ONCE
Status: COMPLETED | OUTPATIENT
Start: 2024-11-21 | End: 2024-11-21

## 2024-11-21 RX ORDER — GABAPENTIN 300 MG/1
300 CAPSULE ORAL 3 TIMES DAILY
Qty: 9 CAPSULE | Refills: 0 | Status: SHIPPED | OUTPATIENT
Start: 2024-11-21 | End: 2024-11-24

## 2024-11-21 RX ORDER — PREDNISONE 20 MG/1
60 TABLET ORAL ONCE
Status: COMPLETED | OUTPATIENT
Start: 2024-11-21 | End: 2024-11-21

## 2024-11-21 RX ORDER — NAPROXEN 250 MG/1
500 TABLET ORAL
Qty: 40 TABLET | Refills: 0 | Status: SHIPPED | OUTPATIENT
Start: 2024-11-21 | End: 2024-12-01

## 2024-11-21 RX ORDER — LIDOCAINE 560 MG/1
1 PATCH PERCUTANEOUS; TOPICAL; TRANSDERMAL ONCE
Status: DISCONTINUED | OUTPATIENT
Start: 2024-11-21 | End: 2024-11-21 | Stop reason: HOSPADM

## 2024-11-21 ASSESSMENT — COLUMBIA-SUICIDE SEVERITY RATING SCALE - C-SSRS
2. HAVE YOU ACTUALLY HAD ANY THOUGHTS OF KILLING YOURSELF?: NO
6. HAVE YOU EVER DONE ANYTHING, STARTED TO DO ANYTHING, OR PREPARED TO DO ANYTHING TO END YOUR LIFE?: NO
1. IN THE PAST MONTH, HAVE YOU WISHED YOU WERE DEAD OR WISHED YOU COULD GO TO SLEEP AND NOT WAKE UP?: NO

## 2024-11-21 ASSESSMENT — PAIN SCALES - GENERAL
PAINLEVEL_OUTOF10: 3
PAINLEVEL_OUTOF10: 3
PAINLEVEL_OUTOF10: 1

## 2024-11-21 ASSESSMENT — PAIN DESCRIPTION - ORIENTATION
ORIENTATION: LEFT
ORIENTATION: LEFT

## 2024-11-21 ASSESSMENT — LIFESTYLE VARIABLES
HAVE PEOPLE ANNOYED YOU BY CRITICIZING YOUR DRINKING: NO
TOTAL SCORE: 0
HAVE YOU EVER FELT YOU SHOULD CUT DOWN ON YOUR DRINKING: NO
EVER FELT BAD OR GUILTY ABOUT YOUR DRINKING: NO
EVER HAD A DRINK FIRST THING IN THE MORNING TO STEADY YOUR NERVES TO GET RID OF A HANGOVER: NO

## 2024-11-21 ASSESSMENT — PAIN DESCRIPTION - LOCATION: LOCATION: HIP

## 2024-11-21 ASSESSMENT — PAIN - FUNCTIONAL ASSESSMENT: PAIN_FUNCTIONAL_ASSESSMENT: 0-10

## 2024-11-21 NOTE — ED TRIAGE NOTES
Has history of ostearthritis, says he has been managing pain with tylenol for about a month but stop taking due to concern about liver failure, states since he stop taking pain has increase making it hard to get out of bed

## 2024-11-21 NOTE — ED PROVIDER NOTES
HPI   Chief Complaint   Patient presents with    Hip Pain     left       74-year-old male with history of hypertension, dyslipidemia and hypothyroidism comes to the emergency department with a chief complaint of left hip pain.  Patient states that over the last 2 to 3 days he has been having worsening pain and stiffness in his left hip.  He states that he has history of osteoarthritis and when he tries to get up at night he feels that it is so stiff that he cannot move it and has difficulty getting out of bed.  He denies any fevers.  He denies any saddle anesthesia.  He states that occasionally the pain radiates to the front of the hip and down the back of his thigh.  He denies any trauma.  He denies constipation or urinary symptoms.      History provided by:  Patient and significant other   used: No            Patient History   Past Medical History:   Diagnosis Date    Dependence on other enabling machines and devices     History of continuous positive airway pressure (CPAP) therapy at home    Encounter for general adult medical examination without abnormal findings 07/12/2019    Welcome to Medicare preventive visit    Hip pain, acute, right     Low back pain     Personal history of other endocrine, nutritional and metabolic disease 03/03/2015    History of type 2 multiple endocrine neoplasia (MEN)     Past Surgical History:   Procedure Laterality Date    APPENDECTOMY  03/03/2015    Appendectomy    COLONOSCOPY  03/03/2015    Complete Colonoscopy    HERNIA REPAIR  03/03/2015    Hernia Repair    OTHER SURGICAL HISTORY  02/17/2017    Laminectomy Decompressive Up To Two Lumbar Segments    OTHER SURGICAL HISTORY  04/08/2021    Back surgery    SINUS SURGERY  03/03/2015    Sinus Surgery    TONSILLECTOMY  03/03/2015    Tonsillectomy    TOTAL THYROIDECTOMY  01/05/2022    Thyroid Surgery Total Thyroidectomy     Family History   Problem Relation Name Age of Onset    Breast cancer Mother      Other  (mesothelioma) Father      Breast cancer Sister      Diabetes type II Sister       Social History     Tobacco Use    Smoking status: Former     Types: Cigars    Smokeless tobacco: Never   Vaping Use    Vaping status: Never Used   Substance Use Topics    Alcohol use: Yes     Alcohol/week: 3.0 standard drinks of alcohol     Types: 3 Cans of beer per week     Comment: 1/2 BEER OR GLASS OF WINE NIGHTLY    Drug use: Never       Physical Exam   ED Triage Vitals [11/21/24 0502]   Temperature Heart Rate Respirations BP   36.2 °C (97.2 °F) 77 16 (!) 155/94      Pulse Ox Temp src Heart Rate Source Patient Position   98 % -- -- --      BP Location FiO2 (%)     -- --       Physical Exam  Vitals and nursing note reviewed.   Constitutional:       General: He is not in acute distress.     Appearance: He is well-developed.   HENT:      Head: Normocephalic and atraumatic.   Eyes:      Conjunctiva/sclera: Conjunctivae normal.   Cardiovascular:      Rate and Rhythm: Normal rate and regular rhythm.      Heart sounds: No murmur heard.  Pulmonary:      Effort: Pulmonary effort is normal. No respiratory distress.      Breath sounds: Normal breath sounds.   Abdominal:      Palpations: Abdomen is soft.      Tenderness: There is no abdominal tenderness.   Musculoskeletal:         General: Tenderness present. No swelling or deformity.      Cervical back: Neck supple.      Right lower leg: No edema.      Left lower leg: No edema.      Comments: Left paraspinal tenderness along the low lumbar spine and sacrum.  No step-offs, no midline tenderness,    Minimal antalgic gait.   Skin:     General: Skin is warm and dry.      Capillary Refill: Capillary refill takes less than 2 seconds.   Neurological:      General: No focal deficit present.      Mental Status: He is alert and oriented to person, place, and time.      Sensory: No sensory deficit.      Motor: No weakness.      Coordination: Coordination normal.      Gait: Gait abnormal.   Psychiatric:          Mood and Affect: Mood normal.           ED Course & MDM   Diagnoses as of 11/22/24 0711   Left hip pain   Osteoarthritis of left hip, unspecified osteoarthritis type   Lumbosacral radiculopathy                 No data recorded     Isi Coma Scale Score: 15 (11/21/24 0726 : Racheal Watkins, DEANDRA)                           Medical Decision Making    HPI:  As Above  PMHx/PSHx/Meds/Allergies/SH/FH as per nursing documentation and reviewed.  Review of systems: Total of 10 systems reviewed and otherwise negative except as noted elsewhere    DDX: As described in MDM    If performed, radiology listed above interpreted by me and confirmed by the Radiologist.  Medications administered during this visit (name and route): see MAR  Social determinants of health considered for this visit: lives at home  If performed, EKG interpreted by me and detailed above    [facility] Summary/considerations:  74-year-old male presenting with nontraumatic low back pain that radiates down his leg.  He does not have a history of cancer and denies IV drug abuse send other history that would lead to pathological fracture.  CT imaging of his lumbosacral spine is not indicated at this time.  Patient is able to bear weight and does not show weakness.  Patient likely has a lumbosacral radiculopathy.  Patient will be given oral steroids, lidocaine patch and instructed to continue using Tylenol and NSAIDs for pain.  Additionally, as he keeps waking up with the pain and it improves after movement autoimmune/inflammatory etiology is also likely.  Conditions such as polyarthralgia rheumatica will likely improve with steroids and he is instructed to follow-up with an orthopedic surgeon.  Should pain resolved and not recur with a short course of steroids rheumatoid conditions are less likely, however should it return patient will likely need a longer term steroid taper.  He is also instructed to follow-up with his primary care provider.  In the emergency  department, patient's pain has nearly resolved.    Prescriptions provided include: Oral gabapentin, naproxen, Medrol Dosepak and lidocaine patches    The patient was seen and triaged by our nursing/medic staff, their vitals were taken and the staff notes were reviewed.  If the patient arrived by an EMS squad or an outside agency, we discussed the case with transporting EMS medic, police, or other historians. My initial assessment was attention to their airway, breathing, and circulatory status.  We addressed any immediate or life threatening findings and completed a medical history and a physical exam if the patient or those legally responsible were in agreement with this.   Prior to the patient being discharged, I or my PA/NP or the nursing staff discussed the differential, results and discharge plan with the patient and/or family/friend/caregiver if present.  I emphasized the importance of follow-up in 2-3 days unless otherwise specified.  I explained reasons for the patient to return to the Emergency Department. Additional verbal discharge instructions were also given and discussed with the patient to supplement those generated by the EMR. We also discussed medications that were prescribed (if any) including common side effects and interactions. The patient was advised to abstain from driving, operating heavy machinery or making significant decisions while taking medications such as antihistamines, benzodiazepines, opiates and muscle relaxers. All questions were addressed.  They understand return precautions and discharge instructions. The patient and/or family/friend/caregiver expressed understanding.  **Disclaimer:  This note was dictated by speech recognition technology.  Minor errors in transcription may be present.  Please contact for clarification or corrections.          Procedure  Procedures     Radha Wisdom MD  11/22/24 0748

## 2024-11-24 ASSESSMENT — ACTIVITIES OF DAILY LIVING (ADL)
BATHING: INDEPENDENT
TAKING_MEDICATION: INDEPENDENT
DRESSING: INDEPENDENT
MANAGING_FINANCES: INDEPENDENT
DOING_HOUSEWORK: INDEPENDENT
GROCERY_SHOPPING: INDEPENDENT

## 2024-11-24 ASSESSMENT — ENCOUNTER SYMPTOMS
LOSS OF SENSATION IN FEET: 0
OCCASIONAL FEELINGS OF UNSTEADINESS: 0
CONSTITUTIONAL NEGATIVE: 1
DEPRESSION: 0

## 2024-11-25 ENCOUNTER — HOSPITAL ENCOUNTER (OUTPATIENT)
Dept: RADIOLOGY | Facility: CLINIC | Age: 74
Discharge: HOME | End: 2024-11-25
Payer: MEDICARE

## 2024-11-25 ENCOUNTER — APPOINTMENT (OUTPATIENT)
Dept: PRIMARY CARE | Facility: CLINIC | Age: 74
End: 2024-11-25
Payer: MEDICARE

## 2024-11-25 VITALS
WEIGHT: 206.6 LBS | BODY MASS INDEX: 29.58 KG/M2 | HEART RATE: 65 BPM | DIASTOLIC BLOOD PRESSURE: 80 MMHG | SYSTOLIC BLOOD PRESSURE: 131 MMHG | HEIGHT: 70 IN

## 2024-11-25 DIAGNOSIS — E89.0 H/O TOTAL THYROIDECTOMY: ICD-10-CM

## 2024-11-25 DIAGNOSIS — M54.17 LUMBOSACRAL RADICULOPATHY: ICD-10-CM

## 2024-11-25 DIAGNOSIS — D50.9 IRON DEFICIENCY ANEMIA, UNSPECIFIED IRON DEFICIENCY ANEMIA TYPE: ICD-10-CM

## 2024-11-25 DIAGNOSIS — I10 PRIMARY HYPERTENSION: ICD-10-CM

## 2024-11-25 DIAGNOSIS — M16.12 OSTEOARTHRITIS OF LEFT HIP, UNSPECIFIED OSTEOARTHRITIS TYPE: Primary | Chronic | ICD-10-CM

## 2024-11-25 DIAGNOSIS — D64.9 ANEMIA, UNSPECIFIED TYPE: ICD-10-CM

## 2024-11-25 DIAGNOSIS — M25.552 LEFT HIP PAIN: ICD-10-CM

## 2024-11-25 DIAGNOSIS — G47.33 OBSTRUCTIVE SLEEP APNEA OF ADULT: ICD-10-CM

## 2024-11-25 DIAGNOSIS — C73 MEDULLARY THYROID CARCINOMA (MULTI): ICD-10-CM

## 2024-11-25 DIAGNOSIS — E03.9 ACQUIRED HYPOTHYROIDISM: ICD-10-CM

## 2024-11-25 PROCEDURE — 73502 X-RAY EXAM HIP UNI 2-3 VIEWS: CPT | Mod: LT

## 2024-11-25 PROCEDURE — 73502 X-RAY EXAM HIP UNI 2-3 VIEWS: CPT | Mod: LEFT SIDE | Performed by: RADIOLOGY

## 2024-11-25 PROCEDURE — G2211 COMPLEX E/M VISIT ADD ON: HCPCS | Performed by: INTERNAL MEDICINE

## 2024-11-25 PROCEDURE — 3075F SYST BP GE 130 - 139MM HG: CPT | Performed by: INTERNAL MEDICINE

## 2024-11-25 PROCEDURE — 99214 OFFICE O/P EST MOD 30 MIN: CPT | Performed by: INTERNAL MEDICINE

## 2024-11-25 PROCEDURE — 3008F BODY MASS INDEX DOCD: CPT | Performed by: INTERNAL MEDICINE

## 2024-11-25 PROCEDURE — 1159F MED LIST DOCD IN RCRD: CPT | Performed by: INTERNAL MEDICINE

## 2024-11-25 PROCEDURE — 3079F DIAST BP 80-89 MM HG: CPT | Performed by: INTERNAL MEDICINE

## 2024-11-25 PROCEDURE — 1036F TOBACCO NON-USER: CPT | Performed by: INTERNAL MEDICINE

## 2024-11-25 PROCEDURE — 1160F RVW MEDS BY RX/DR IN RCRD: CPT | Performed by: INTERNAL MEDICINE

## 2024-11-25 PROCEDURE — 1123F ACP DISCUSS/DSCN MKR DOCD: CPT | Performed by: INTERNAL MEDICINE

## 2024-11-25 PROCEDURE — 1170F FXNL STATUS ASSESSED: CPT | Performed by: INTERNAL MEDICINE

## 2024-11-25 RX ORDER — GABAPENTIN 300 MG/1
300 CAPSULE ORAL 3 TIMES DAILY
Qty: 90 CAPSULE | Refills: 1 | Status: SHIPPED | OUTPATIENT
Start: 2024-11-25 | End: 2024-12-25

## 2024-11-25 ASSESSMENT — PATIENT HEALTH QUESTIONNAIRE - PHQ9
SUM OF ALL RESPONSES TO PHQ9 QUESTIONS 1 AND 2: 0
1. LITTLE INTEREST OR PLEASURE IN DOING THINGS: NOT AT ALL
2. FEELING DOWN, DEPRESSED OR HOPELESS: NOT AT ALL

## 2024-11-25 NOTE — PROGRESS NOTES
"Patient ID: Gaurav Fischer \"Sidney\" is a 74 y.o. male who presents for Follow-up (Back pain left leg pain and hip pain ).    /80   Pulse 65   Ht 1.765 m (5' 9.5\")   Wt 93.7 kg (206 lb 9.6 oz)   BMI 30.07 kg/m²     HPI      Patient recently seen in the emergency  Because of his hip pain  Radiating down to the left leg  Pain scale is 6-7 out of 10    I have reviewed the emergency note  He was prescribed gabapentin 300 mg 1 pill 3 times a day only given 90 pills  He was also prescribed naproxen milligram 2 tablets twice a day  And also lidocaine patch it seems to have been helping    He is still having pain in the left groin  Pain is also radiating to the left leg    He has arthritis left hip  He has arthritis of the lumbar spine  Moderate L4-L5 and L5-S1    It is affecting his ability to do his regular chores  And yard work  Any heavy physical work is making it worse      Hypertension on medications controlled  No chest pain, no shortness of breath, no PND, no orthopnea, no leg swelling, no palpitation, no headache,      He has mild iron deficiency anemia  He had a colonoscopy in 2015  He have has not noticed any blood in the stool  No abdominal pain  No weight loss    Acquired hypothyroidism stable    Status post total thyroidectomy    Midline carcinoma of the thyroid stable      Obstructive sleep apnea using the CPAP  Doing pretty well with the CPAP      Subjective     Review of Systems   Constitutional: Negative.    All other systems reviewed and are negative.      Objective     Physical Exam  Vitals and nursing note reviewed.   Neck:      Vascular: No carotid bruit.   Cardiovascular:      Rate and Rhythm: Normal rate and regular rhythm.      Pulses: Normal pulses.      Heart sounds: Normal heart sounds. No murmur heard.  Pulmonary:      Effort: Pulmonary effort is normal.      Breath sounds: Normal breath sounds.   Abdominal:      Palpations: There is no mass.   Musculoskeletal:      Right lower leg: No " edema.      Left lower leg: No edema.      Comments: Lumbar flexion extension slightly painful  No lumbar spine tenderness  Left hip internal and external rotation is painful  Left hip abduction adduction painful     Skin:     Capillary Refill: Capillary refill takes more than 3 seconds.   Neurological:      General: No focal deficit present.      Mental Status: He is oriented to person, place, and time. Mental status is at baseline.   Psychiatric:         Mood and Affect: Mood normal.         Behavior: Behavior normal.         Thought Content: Thought content normal.         Judgment: Judgment normal.         Lab Results   Component Value Date    WBC 4.9 11/18/2024    HGB 12.8 (L) 11/18/2024    HCT 38.1 (L) 11/18/2024    MCV 82 11/18/2024     11/18/2024           Problem List Items Addressed This Visit       Primary hypertension    Acquired hypothyroidism    H/O total thyroidectomy    Medullary thyroid carcinoma (Multi)    Obstructive sleep apnea of adult    Iron deficiency anemia     Other Visit Diagnoses       Osteoarthritis of left hip, unspecified osteoarthritis type  (Chronic)   -  Primary    Relevant Medications    gabapentin (Neurontin) 300 mg capsule    Left hip pain        Relevant Medications    gabapentin (Neurontin) 300 mg capsule    Other Relevant Orders    XR hip left with pelvis when performed 2 or 3 views    Lumbosacral radiculopathy        Relevant Medications    gabapentin (Neurontin) 300 mg capsule    Anemia, unspecified type        Relevant Orders    Colonoscopy Diagnostic                 A/P      Refferal colonoscopy   Naproxen 250mg 2 tabs bid to continue  To take with food and 1 glass of water   Gabapentin 300mg 1 capsule three times a day filled   X-ray left hip           Advance Care Planning Note     Discussion Date: 11/25/24   Discussion Participants: patient    The patient wishes to discuss Advance Care Planning today and the following is a brief summary of our discussion.      Patient has capacity to make their own medical decisions: Yes  Health Care Agent/Surrogate Decision Maker documented in chart: Yes    Documents on file and valid:  Advance Directive/Living Will: No   Health Care Power of : Yes  Other:     Communication of Medical Status/Prognosis:        Communication of Treatment Goals/Options:     Discussed with the patient end-of-life choices patient is presently full code he does not have a living will or healthcare power of  he will think about getting it done when ready will bring it on next office visit so that it can be scanned into the chart for the purpose of documentation    Treatment Decisions  Goals of Care: survival is prioritized, if goals for quality or survival can reasonably be achieved       Follow Up Plan    Team Members    Time Statement: Total face to face time spent on advance care planning was 5 minutes with 5 minutes spent in counseling, including the explanation.    Renetta Barnett MD  11/25/2024 11:14 AM

## 2024-11-29 ENCOUNTER — APPOINTMENT (OUTPATIENT)
Dept: UROLOGY | Facility: CLINIC | Age: 74
End: 2024-11-29
Payer: MEDICARE

## 2024-12-01 DIAGNOSIS — M25.552 PAIN OF LEFT HIP: Primary | ICD-10-CM

## 2024-12-01 DIAGNOSIS — M16.12 ARTHRITIS OF LEFT HIP: ICD-10-CM

## 2024-12-02 ENCOUNTER — APPOINTMENT (OUTPATIENT)
Dept: UROLOGY | Facility: CLINIC | Age: 74
End: 2024-12-02
Payer: MEDICARE

## 2024-12-02 VITALS — HEIGHT: 70 IN | BODY MASS INDEX: 30.12 KG/M2 | WEIGHT: 210.4 LBS | TEMPERATURE: 95.9 F

## 2024-12-02 DIAGNOSIS — M25.552 LEFT HIP PAIN: ICD-10-CM

## 2024-12-02 DIAGNOSIS — N40.1 BPH WITH OBSTRUCTION/LOWER URINARY TRACT SYMPTOMS: Primary | ICD-10-CM

## 2024-12-02 DIAGNOSIS — M16.12 OSTEOARTHRITIS OF LEFT HIP, UNSPECIFIED OSTEOARTHRITIS TYPE: ICD-10-CM

## 2024-12-02 DIAGNOSIS — Z79.2 NEED FOR PROPHYLACTIC ANTIBIOTIC: ICD-10-CM

## 2024-12-02 DIAGNOSIS — N13.8 BPH WITH OBSTRUCTION/LOWER URINARY TRACT SYMPTOMS: Primary | ICD-10-CM

## 2024-12-02 PROCEDURE — 51798 US URINE CAPACITY MEASURE: CPT | Performed by: UROLOGY

## 2024-12-02 PROCEDURE — 51741 ELECTRO-UROFLOWMETRY FIRST: CPT | Performed by: UROLOGY

## 2024-12-02 PROCEDURE — 99214 OFFICE O/P EST MOD 30 MIN: CPT | Performed by: UROLOGY

## 2024-12-02 PROCEDURE — 1159F MED LIST DOCD IN RCRD: CPT | Performed by: UROLOGY

## 2024-12-02 PROCEDURE — 1123F ACP DISCUSS/DSCN MKR DOCD: CPT | Performed by: UROLOGY

## 2024-12-02 PROCEDURE — 1126F AMNT PAIN NOTED NONE PRSNT: CPT | Performed by: UROLOGY

## 2024-12-02 PROCEDURE — 3008F BODY MASS INDEX DOCD: CPT | Performed by: UROLOGY

## 2024-12-02 RX ORDER — NAPROXEN 250 MG/1
500 TABLET ORAL
Qty: 40 TABLET | Refills: 1 | Status: SHIPPED | OUTPATIENT
Start: 2024-12-02 | End: 2024-12-12

## 2024-12-02 RX ORDER — CEPHALEXIN 500 MG/1
500 CAPSULE ORAL ONCE
Qty: 1 CAPSULE | Refills: 0 | Status: SHIPPED | OUTPATIENT
Start: 2024-12-02 | End: 2024-12-02

## 2024-12-02 ASSESSMENT — PAIN SCALES - GENERAL: PAINLEVEL_OUTOF10: 0-NO PAIN

## 2024-12-02 NOTE — PROGRESS NOTES
Gaurav Fischer is a 74 y.o. male with history of BPH s/p HoLEP on 2024 who presents for 3 month post-op visit. Patient is doing well overall. He has nocturia x3, however, he has sleep apnea. He reports his urinary frequency is normal. He feels like he empties his bladder well occasionally. He notes his stream is not as strong as he anticipated. He double voids occasionally.     Path: 26.9g of benign tissue removed.         IPSS: 11 and 2   PVR: 23ml   Uroflow: volume voided 214 ml, Q max 15 ml/min    Current Outpatient Medications on File Prior to Visit   Medication Sig Dispense Refill    amLODIPine (Norvasc) 5 mg tablet Take 1 tablet (5 mg) by mouth once daily. 90 tablet 1    cetirizine (ZyrTEC) 10 mg tablet Take 1 tablet (10 mg) by mouth if needed.      desoximetasone (Topicort) 0.25 % cream Desoximetasone 0.25 % External Cream APPLY AND GENTLY MASSAGE INTO AFFECTED AREA(S) TWICE DAILY. as needed Refills: 0 Start : 26-Oct-2016 Active      famotidine (Pepcid) 20 mg tablet Take 1 tablet (20 mg) by mouth once daily.      gabapentin (Neurontin) 300 mg capsule Take 1 capsule (300 mg) by mouth 3 times a day. 90 capsule 1    hydrocortisone 1 % ointment Apply topically if needed.      levothyroxine (Synthroid, Levoxyl) 112 mcg tablet Take 1 tablet (112 mcg) by mouth once daily in the morning. Take on empty stomach 90 tablet 1    lidocaine (Lidoderm) 5 % patch Place 1 patch over 12 hours on the skin once daily for 14 days. Remove & discard patch within 12 hours or as directed by MD. 14 patch 0    lovastatin (Mevacor) 40 mg tablet Take 1 tablet (40 mg) by mouth once daily at bedtime. 90 tablet 2    MELATONIN ORAL Take 1 mg by mouth. Take as directed      [] methylPREDNISolone (Medrol Dospak) 4 mg tablets Follow schedule on package instructions 21 tablet 0    neomycin-polymyxin-pramoxine (Neosporin) 3.5-10,000-10 mg-unit-mg/gram cream Apply topically.      tadalafil (Cialis) 10 mg tablet Take 1 tablet (10 mg)  "by mouth if needed for erectile dysfunction. 30 tablet 11    triamcinolone (Nasacort) 55 mcg nasal inhaler Administer into affected nostril(s).      [DISCONTINUED] naproxen (Naprosyn) 250 mg tablet Take 2 tablets (500 mg) by mouth 2 times daily (morning and late afternoon) for 10 days. 40 tablet 0     No current facility-administered medications on file prior to visit.     No results found for: \"PSA\"        Plan:  BPH s/p HoLEP on 8/26/2024     We will proceed with cystoscopy to r/o a [facility].     The risks of cystoscopy were discussed with the patient in great detail, including risk of hematuria, UTI and discomfort. Antibiotic will be prescribed to be taken 1 hour prior to the procedure. Patient agrees to proceed.     All questions were answered to the patient's satisfaction. Patient agrees with the plan and wishes to proceed. Follow-up will be scheduled appropriately.     E&M visit today is associated with current or anticipated ongoing medical care services related to a patient's single, serious condition or a complex condition.      Scribed for Dr. Tejada by Nolvia Arenas. I , Dr Tejada, have personally reviewed and agreed with the information entered by the Virtual Scribe.   "

## 2024-12-03 ENCOUNTER — APPOINTMENT (OUTPATIENT)
Dept: ORTHOPEDIC SURGERY | Facility: CLINIC | Age: 74
End: 2024-12-03
Payer: MEDICARE

## 2024-12-04 DIAGNOSIS — E03.9 HYPOTHYROIDISM, UNSPECIFIED: ICD-10-CM

## 2024-12-04 DIAGNOSIS — E03.9 ACQUIRED HYPOTHYROIDISM: ICD-10-CM

## 2024-12-05 RX ORDER — LEVOTHYROXINE SODIUM 112 UG/1
112 TABLET ORAL EVERY MORNING
Qty: 90 TABLET | Refills: 2 | Status: SHIPPED | OUTPATIENT
Start: 2024-12-05

## 2024-12-06 DIAGNOSIS — M25.559 HIP PAIN, UNSPECIFIED LATERALITY: Primary | ICD-10-CM

## 2024-12-06 RX ORDER — MELOXICAM 15 MG/1
15 TABLET ORAL DAILY
Qty: 30 TABLET | Refills: 1 | Status: SHIPPED | OUTPATIENT
Start: 2024-12-06 | End: 2025-12-06

## 2024-12-09 ENCOUNTER — APPOINTMENT (OUTPATIENT)
Dept: ORTHOPEDIC SURGERY | Facility: CLINIC | Age: 74
End: 2024-12-09
Payer: MEDICARE

## 2024-12-19 ENCOUNTER — APPOINTMENT (OUTPATIENT)
Dept: ORTHOPEDIC SURGERY | Facility: CLINIC | Age: 74
End: 2024-12-19
Payer: MEDICARE

## 2024-12-19 VITALS — WEIGHT: 210 LBS | BODY MASS INDEX: 31.1 KG/M2 | HEIGHT: 69 IN

## 2024-12-19 DIAGNOSIS — R73.9 HYPERGLYCEMIA: ICD-10-CM

## 2024-12-19 DIAGNOSIS — M25.552 LEFT HIP PAIN: Primary | ICD-10-CM

## 2024-12-19 PROCEDURE — G2211 COMPLEX E/M VISIT ADD ON: HCPCS | Performed by: STUDENT IN AN ORGANIZED HEALTH CARE EDUCATION/TRAINING PROGRAM

## 2024-12-19 PROCEDURE — 1159F MED LIST DOCD IN RCRD: CPT | Performed by: STUDENT IN AN ORGANIZED HEALTH CARE EDUCATION/TRAINING PROGRAM

## 2024-12-19 PROCEDURE — 1123F ACP DISCUSS/DSCN MKR DOCD: CPT | Performed by: STUDENT IN AN ORGANIZED HEALTH CARE EDUCATION/TRAINING PROGRAM

## 2024-12-19 PROCEDURE — 1125F AMNT PAIN NOTED PAIN PRSNT: CPT | Performed by: STUDENT IN AN ORGANIZED HEALTH CARE EDUCATION/TRAINING PROGRAM

## 2024-12-19 PROCEDURE — 99205 OFFICE O/P NEW HI 60 MIN: CPT | Performed by: STUDENT IN AN ORGANIZED HEALTH CARE EDUCATION/TRAINING PROGRAM

## 2024-12-19 PROCEDURE — 1036F TOBACCO NON-USER: CPT | Performed by: STUDENT IN AN ORGANIZED HEALTH CARE EDUCATION/TRAINING PROGRAM

## 2024-12-19 PROCEDURE — 3008F BODY MASS INDEX DOCD: CPT | Performed by: STUDENT IN AN ORGANIZED HEALTH CARE EDUCATION/TRAINING PROGRAM

## 2024-12-19 ASSESSMENT — PAIN - FUNCTIONAL ASSESSMENT: PAIN_FUNCTIONAL_ASSESSMENT: 0-10

## 2024-12-19 ASSESSMENT — PAIN DESCRIPTION - DESCRIPTORS: DESCRIPTORS: ACHING

## 2024-12-19 ASSESSMENT — PAIN SCALES - GENERAL: PAINLEVEL_OUTOF10: 3

## 2024-12-19 NOTE — PROGRESS NOTES
JULISA/TKA Related Summary           L hip: N  L knee: N  R hip: N  R knee: N  Lumbar surgery or significant pathology  25yrs ago: 2 level lumbar laminectomy            CC/SUBJECTIVE/HPI            Referring provider: No ref. provider found  Gaurav Fischer is a 74 y.o. male presenting for L hip pain.  This pain has been hurting him for a little over a year.  It came on gradually, and it is now to the point of causing him significant discomfort and preventing both his hobbies (including square dancing) and making ADLs difficult    L hip  Symptoms  Pain: 9/10  Onset: chronic/gradual  Duration: 1-2yrs  Location: groin, side of hip, and front thigh  Quality: sharp/stab and catch/lock  Limitations: morning stiffness, pain after activity, limp, weakness, feeling unstable, night pain, difficulty with stairs, difficulty in/out of chair/car, and difficulty with socks/shoes/clothes  Ambulation limit due to pain: 100-500ft  Other symptoms: none  Treatment  Tried: activity modification, PT, home exercises, OTCs, and Celebrex/Mobic  Most recent injection <3mo ago? na  Assistive device: cane  Treatment attempted for 3mo-1yr and is no longer effective            HISTORIES (System Generated and Pt Reported on Form Today)       Dental  Pt reported: No active issues     PMH  Pt reported: Denies heart/lung/kidney/liver issues, DM, stroke, seizure, bariatric surgery, anticoag, MRSA, cancer, personal/familial coagulopathies except: none in addition to below  System generated (PMH, problem list both included since EMR change caused discrepancies):   Past Medical History:   Diagnosis Date    Dependence on other enabling machines and devices     History of continuous positive airway pressure (CPAP) therapy at home    Encounter for general adult medical examination without abnormal findings 07/12/2019    Welcome to Medicare preventive visit    Hip pain, acute, right     Low back pain     Personal history of other endocrine, nutritional and  metabolic disease 03/03/2015    History of type 2 multiple endocrine neoplasia (MEN)      Patient Active Problem List   Diagnosis    Primary hypertension    Acquired hypothyroidism    Other hyperlipidemia    Allergic rhinitis    Alpha-1-antitrypsin deficiency (Multi)    BPH with obstruction/lower urinary tract symptoms    Cheek mass    Chronic ethmoidal sinusitis    Chronic maxillary sinusitis    Claudication, intermittent (CMS-HCC)    Dyspnea on exertion    Dysuria    Erectile dysfunction    Esophageal reflux    Eye pain, bilateral    Fainting spell    Frequency of urination    Gallstones    H/O total thyroidectomy    History of endoscopic sinus surgery    Hypersomnia with sleep apnea    Incisional hernia, without obstruction or gangrene    Intermittent lightheadedness    Kidney cysts    Lung nodule    Medullary thyroid carcinoma (Multi)    Nocturia    Nocturnal polyuria    Obstructive sleep apnea of adult    Overweight (BMI 25.0-29.9)    Palpitations    Polyp of nasal cavity    Pyogenic granuloma    Acute right hip pain    Sensation of cold in finger    Tinnitus of right ear    Trochanteric bursitis of right hip    Anemia, deficiency    Chronic low back pain    Pain in the coccyx    Elevated coronary artery calcium score    Primary osteoarthritis of lumbar spine    Chronic left-sided low back pain with left-sided sciatica    Insufficient sleep syndrome    BMI 31.0-31.9,adult    Urinary frequency    Benign prostatic hyperplasia with urinary frequency    Iron deficiency anemia     PSH  Pt reported: Per above.   System generated:   Past Surgical History:   Procedure Laterality Date    APPENDECTOMY  03/03/2015    Appendectomy    COLONOSCOPY  03/03/2015    Complete Colonoscopy    HERNIA REPAIR  03/03/2015    Hernia Repair    OTHER SURGICAL HISTORY  02/17/2017    Laminectomy Decompressive Up To Two Lumbar Segments    OTHER SURGICAL HISTORY  04/08/2021    Back surgery    SINUS SURGERY  03/03/2015    Sinus Surgery     TONSILLECTOMY  03/03/2015    Tonsillectomy    TOTAL THYROIDECTOMY  01/05/2022    Thyroid Surgery Total Thyroidectomy     Family Hx  Pt reported clot/coagulopathies: none    Social Hx  Pt reported substance use: per below  System generated:   Social History     Tobacco Use    Smoking status: Former     Types: Cigars    Smokeless tobacco: Never   Vaping Use    Vaping status: Never Used   Substance Use Topics    Alcohol use: Yes     Alcohol/week: 3.0 standard drinks of alcohol     Types: 3 Cans of beer per week     Comment: 1/2 BEER OR GLASS OF WINE NIGHTLY    Drug use: Never     Allergies  Pt reported (meds, metals): per below  System generated:   Allergies   Allergen Reactions    Sulfamethoxazole-Trimethoprim Anaphylaxis     patient states throat swells    Griseofulvin Itching     antifungals    Pollen Extracts Other     sneezing     Current Meds  System generated:   Current Outpatient Medications:     amLODIPine (Norvasc) 5 mg tablet, Take 1 tablet (5 mg) by mouth once daily., Disp: 90 tablet, Rfl: 1    cetirizine (ZyrTEC) 10 mg tablet, Take 1 tablet (10 mg) by mouth if needed., Disp: , Rfl:     desoximetasone (Topicort) 0.25 % cream, Desoximetasone 0.25 % External Cream APPLY AND GENTLY MASSAGE INTO AFFECTED AREA(S) TWICE DAILY. as needed Refills: 0 Start : 26-Oct-2016 Active, Disp: , Rfl:     famotidine (Pepcid) 20 mg tablet, Take 1 tablet (20 mg) by mouth once daily., Disp: , Rfl:     gabapentin (Neurontin) 300 mg capsule, Take 1 capsule (300 mg) by mouth 3 times a day., Disp: 90 capsule, Rfl: 1    hydrocortisone 1 % ointment, Apply topically if needed., Disp: , Rfl:     levothyroxine (Synthroid, Levoxyl) 112 mcg tablet, TAKE 1 TABLET (112 MCG) BY MOUTH ONCE DAILY IN THE MORNING. TAKE ON EMPTY STOMACH, Disp: 90 tablet, Rfl: 2    lovastatin (Mevacor) 40 mg tablet, Take 1 tablet (40 mg) by mouth once daily at bedtime., Disp: 90 tablet, Rfl: 2    MELATONIN ORAL, Take 1 mg by mouth. Take as directed, Disp: , Rfl:      "meloxicam (Mobic) 15 mg tablet, Take 1 tablet (15 mg) by mouth once daily. To take with food and 1 glass of water, Disp: 30 tablet, Rfl: 1    naproxen (Naprosyn) 250 mg tablet, Take 2 tablets (500 mg) by mouth 2 times daily (morning and late afternoon) for 10 days. To take with food and 1 glass of water, Disp: 40 tablet, Rfl: 1    neomycin-polymyxin-pramoxine (Neosporin) 3.5-10,000-10 mg-unit-mg/gram cream, Apply topically., Disp: , Rfl:     tadalafil (Cialis) 10 mg tablet, Take 1 tablet (10 mg) by mouth if needed for erectile dysfunction., Disp: 30 tablet, Rfl: 11    triamcinolone (Nasacort) 55 mcg nasal inhaler, Administer into affected nostril(s)., Disp: , Rfl:     ROS: Neg except HPI            OBJECTIVE            Physical exam  Estimated body mass index is 30.63 kg/m² as calculated from the following:    Height as of 12/2/24: 1.765 m (5' 9.5\").    Weight as of 12/2/24: 95.4 kg (210 lb 6.4 oz).  Gen/psych: NAD, conversational, appropriate    Ambulation  Gait: antalgic  Assistive device: cane  Spine  Lumbar tenderness: neg  Limited ROM: neg, with no radiation or pain with flex/ex, lateral bending  SLR test: neg    Focused MSK exam: L  Hip  Trendelenberg test: neg  Skin/incision: normal in area of planned/possible incision (DA approach)   Tenderness: groin  Pain with log roll: pos  Stinchfield: pos  ROM: limited, painful  Flexion: 90º  IR: <10º  ER: 20º  Abd: 20º  Neurovascular    Strength: 5/5 hip/knee/ankle flexion and extension  Sensory (L2-S1): SILT throughout lower extremity  Edema/stasis: no pitting edema  Pulse: DP 1+, PT 1+     Imaging  11/25/2024 L hip radiographs (AP Pelvis, AP/Lateral) on my read: Joint space narrowing (severe) with osteophytes and sclerosis.            ASSESSMENT & PLAN           Patient verbalized understanding of below A&P. All questions answered.  L hip DJD  Differential includes radicular pain from spine. H&P most consistent with hip origin  General information  Evaluation, " imaging, diagnosis, and treatment options (conservative and surgical as well as risks, benefits, recovery, and outcomes) discussed. Conservative options should be maximized and include activity modification, bracing, weight loss, PT, home exercise, local pain control (ice, heat, topicals), OTCs, and assistive devices Once exhausted, injections may provide relief. If these fail to improve pain, function, and quality of life, elective arthroplasty may be an option.  Shared decision making   Elected to be referred to a colleague who performs hip CSI.  Follow-up: As needed. If CSI does not provide relief, he is considering L primary JULISA. Given extensive conservative treatments tried, continued pain, and effect on quality of life, I agree this would be a reasonable decision. Would be a good arthroplasty candidate..  PMH, PSH, other considerations discussed  Anemia (Hgb 12.8 on 11/18/24)  BMI<40 but on spectrum of increased risk of surgical complications.  Hx R GT bursitis  SDD candidate  Errantly in EMR: Diabetes, claudication  Occupation: Retired ( for 40 years)  Hobbies: Pocket billiards, square dancing  PCP: Renetta Barnett MD      1/16/2025 addendum: The patient reached out and expressed interest in trying physical therapy again.  Order entered.

## 2024-12-20 ENCOUNTER — LAB (OUTPATIENT)
Dept: LAB | Facility: LAB | Age: 74
End: 2024-12-20
Payer: MEDICARE

## 2024-12-20 DIAGNOSIS — M25.552 LEFT HIP PAIN: ICD-10-CM

## 2024-12-20 DIAGNOSIS — R73.9 HYPERGLYCEMIA: ICD-10-CM

## 2024-12-20 LAB
ALBUMIN SERPL BCP-MCNC: 4.3 G/DL (ref 3.4–5)
HCT VFR BLD AUTO: 36.5 % (ref 41–52)
HGB BLD-MCNC: 12.1 G/DL (ref 13.5–17.5)

## 2024-12-20 PROCEDURE — 82040 ASSAY OF SERUM ALBUMIN: CPT

## 2024-12-20 PROCEDURE — 83036 HEMOGLOBIN GLYCOSYLATED A1C: CPT

## 2024-12-20 PROCEDURE — 85014 HEMATOCRIT: CPT

## 2024-12-20 PROCEDURE — 36415 COLL VENOUS BLD VENIPUNCTURE: CPT

## 2024-12-20 PROCEDURE — 85018 HEMOGLOBIN: CPT

## 2024-12-21 LAB
EST. AVERAGE GLUCOSE BLD GHB EST-MCNC: 105 MG/DL
HBA1C MFR BLD: 5.3 %

## 2024-12-30 ENCOUNTER — APPOINTMENT (OUTPATIENT)
Dept: ORTHOPEDIC SURGERY | Facility: CLINIC | Age: 74
End: 2024-12-30
Payer: MEDICARE

## 2025-01-06 ENCOUNTER — TELEPHONE (OUTPATIENT)
Dept: PRIMARY CARE | Facility: CLINIC | Age: 75
End: 2025-01-06
Payer: MEDICARE

## 2025-01-07 ENCOUNTER — APPOINTMENT (OUTPATIENT)
Dept: UROLOGY | Facility: CLINIC | Age: 75
End: 2025-01-07
Payer: MEDICARE

## 2025-01-07 VITALS
WEIGHT: 213 LBS | SYSTOLIC BLOOD PRESSURE: 162 MMHG | HEART RATE: 84 BPM | DIASTOLIC BLOOD PRESSURE: 93 MMHG | BODY MASS INDEX: 31.55 KG/M2 | HEIGHT: 69 IN | TEMPERATURE: 96.8 F

## 2025-01-07 DIAGNOSIS — N40.1 BPH WITH OBSTRUCTION/LOWER URINARY TRACT SYMPTOMS: ICD-10-CM

## 2025-01-07 DIAGNOSIS — N13.8 BPH WITH OBSTRUCTION/LOWER URINARY TRACT SYMPTOMS: ICD-10-CM

## 2025-01-07 DIAGNOSIS — N39.3 STRESS INCONTINENCE, MALE: ICD-10-CM

## 2025-01-07 PROCEDURE — 52000 CYSTOURETHROSCOPY: CPT | Performed by: UROLOGY

## 2025-01-07 ASSESSMENT — PAIN SCALES - GENERAL: PAINLEVEL_OUTOF10: 0-NO PAIN

## 2025-01-07 NOTE — PROGRESS NOTES
Subjective   Guarav Fischer is a 74 y.o. male male with history of BPH s/p HoLEP on 8/26/2024 and ED, presenting today for cystoscopy to r/o [facility]. Patient has occasional mild residual stress incontinence.  Denies any recent gross hematuria, fevers, chills, urinary retention, intractable flank or abdominal pain, nausea or vomiting.        Past Medical History:   Diagnosis Date    Dependence on other enabling machines and devices     History of continuous positive airway pressure (CPAP) therapy at home    Encounter for general adult medical examination without abnormal findings 07/12/2019    Welcome to Medicare preventive visit    Hip pain, acute, right     Low back pain     Personal history of other endocrine, nutritional and metabolic disease 03/03/2015    History of type 2 multiple endocrine neoplasia (MEN)     Past Surgical History:   Procedure Laterality Date    APPENDECTOMY  03/03/2015    Appendectomy    COLONOSCOPY  03/03/2015    Complete Colonoscopy    HERNIA REPAIR  03/03/2015    Hernia Repair    OTHER SURGICAL HISTORY  02/17/2017    Laminectomy Decompressive Up To Two Lumbar Segments    OTHER SURGICAL HISTORY  04/08/2021    Back surgery    SINUS SURGERY  03/03/2015    Sinus Surgery    TONSILLECTOMY  03/03/2015    Tonsillectomy    TOTAL THYROIDECTOMY  01/05/2022    Thyroid Surgery Total Thyroidectomy     Family History   Problem Relation Name Age of Onset    Breast cancer Mother      Other (mesothelioma) Father      Breast cancer Sister      Diabetes type II Sister       Current Outpatient Medications   Medication Sig Dispense Refill    amLODIPine (Norvasc) 5 mg tablet Take 1 tablet (5 mg) by mouth once daily. 90 tablet 1    cetirizine (ZyrTEC) 10 mg tablet Take 1 tablet (10 mg) by mouth if needed.      desoximetasone (Topicort) 0.25 % cream Desoximetasone 0.25 % External Cream APPLY AND GENTLY MASSAGE INTO AFFECTED AREA(S) TWICE DAILY. as needed Refills: 0 Start : 26-Oct-2016 Active      famotidine (Pepcid)  20 mg tablet Take 1 tablet (20 mg) by mouth once daily.      gabapentin (Neurontin) 300 mg capsule Take 1 capsule (300 mg) by mouth 3 times a day. 90 capsule 1    hydrocortisone 1 % ointment Apply topically if needed.      levothyroxine (Synthroid, Levoxyl) 112 mcg tablet TAKE 1 TABLET (112 MCG) BY MOUTH ONCE DAILY IN THE MORNING. TAKE ON EMPTY STOMACH 90 tablet 2    lovastatin (Mevacor) 40 mg tablet Take 1 tablet (40 mg) by mouth once daily at bedtime. 90 tablet 2    MELATONIN ORAL Take 1 mg by mouth. Take as directed      meloxicam (Mobic) 15 mg tablet Take 1 tablet (15 mg) by mouth once daily. To take with food and 1 glass of water 30 tablet 1    neomycin-polymyxin-pramoxine (Neosporin) 3.5-10,000-10 mg-unit-mg/gram cream Apply topically.      tadalafil (Cialis) 10 mg tablet Take 1 tablet (10 mg) by mouth if needed for erectile dysfunction. 30 tablet 11    triamcinolone (Nasacort) 55 mcg nasal inhaler Administer into affected nostril(s).       No current facility-administered medications for this visit.     Allergies   Allergen Reactions    Sulfamethoxazole-Trimethoprim Anaphylaxis     patient states throat swells    Griseofulvin Itching     antifungals    Pollen Extracts Other     sneezing     Social History     Socioeconomic History    Marital status:      Spouse name: Not on file    Number of children: Not on file    Years of education: Not on file    Highest education level: Not on file   Occupational History    Not on file   Tobacco Use    Smoking status: Former     Types: Cigars    Smokeless tobacco: Never   Vaping Use    Vaping status: Never Used   Substance and Sexual Activity    Alcohol use: Yes     Alcohol/week: 3.0 standard drinks of alcohol     Types: 3 Cans of beer per week     Comment: 1/2 BEER OR GLASS OF WINE NIGHTLY    Drug use: Never    Sexual activity: Defer   Other Topics Concern    Not on file   Social History Narrative    Not on file     Social Drivers of Health     Financial  Resource Strain: Not on file   Food Insecurity: Not on file   Transportation Needs: Not on file   Physical Activity: Not on file   Stress: Not on file   Social Connections: Not on file   Intimate Partner Violence: Not on file   Housing Stability: Not on file       Review of Systems  Pertinent items are noted in HPI.    Objective   Cystoscopy performed:   Gaurav ALEKSANDER Spiveys identified using two (2) forms of identification.  Procedure: diagnostic cystourethroscopy  Indications for procedure: BPH with LUTS s/p HoLEP   Risks, benefits, and alternatives were discussed in detail.   Patient appears to understand and agrees to proceed.   Patient has signed the procedure consent form.     Cystoscopy findings:  Urethra: normal course and caliber, no evidence of stricture or lesion.  Prostate: widely patent channel without any residual adenoma.  Bladder: normal capacity, no trabeculations, no diverticulum, no stone, tumors or other lesions.  Post-cystoscopy: Patient tolerated procedure without complications.    [] Lateral hypertrophy, with complete channel occlusion.  [] Obstructing median lobe with intravesical protrusion.  [x] Good sphincter coaptation.  [x] Normal bladder.         Lab Review  Lab Results   Component Value Date    WBC 4.9 11/18/2024    RBC 4.66 11/18/2024    HGB 12.1 (L) 12/20/2024    HCT 36.5 (L) 12/20/2024     11/18/2024      Lab Results   Component Value Date    BUN 29 (H) 11/18/2024    CREATININE 1.08 11/18/2024        Urine analysis shows negative     Assessment/Plan   There are no diagnoses linked to this encounter.    BPH s/p HoLEP on 8/26/2024  Mild stress incontinence     Cystoscopy today showed widely patent channel without any residual adenoma.    I offered patient PFPT which he declines at this time.     We will follow up in 6 months.     All questions were answered to the patient's satisfaction. Patient agrees with the plan and wishes to proceed. Follow-up will be scheduled appropriately.      E&M visit today is associated with current or anticipated ongoing medical care services related to a patient's single, serious condition or a complex condition.    Scribed for Dr. Tejada by Nolvia Arenas. I , Dr Tejada, have personally reviewed and agreed with the information entered by the Virtual Scribe.

## 2025-01-17 ENCOUNTER — HOSPITAL ENCOUNTER (OUTPATIENT)
Dept: RADIOLOGY | Facility: EXTERNAL LOCATION | Age: 75
Discharge: HOME | End: 2025-01-17

## 2025-01-17 ENCOUNTER — OFFICE VISIT (OUTPATIENT)
Dept: ORTHOPEDIC SURGERY | Facility: HOSPITAL | Age: 75
End: 2025-01-17
Payer: MEDICARE

## 2025-01-17 VITALS — WEIGHT: 206 LBS | BODY MASS INDEX: 29.49 KG/M2 | HEIGHT: 70 IN

## 2025-01-17 DIAGNOSIS — M16.12 PRIMARY OSTEOARTHRITIS OF LEFT HIP: ICD-10-CM

## 2025-01-17 DIAGNOSIS — M54.16 LUMBAR RADICULOPATHY: ICD-10-CM

## 2025-01-17 DIAGNOSIS — M25.552 LEFT HIP PAIN: ICD-10-CM

## 2025-01-17 PROCEDURE — 2500000004 HC RX 250 GENERAL PHARMACY W/ HCPCS (ALT 636 FOR OP/ED): Performed by: STUDENT IN AN ORGANIZED HEALTH CARE EDUCATION/TRAINING PROGRAM

## 2025-01-17 PROCEDURE — 99214 OFFICE O/P EST MOD 30 MIN: CPT | Performed by: STUDENT IN AN ORGANIZED HEALTH CARE EDUCATION/TRAINING PROGRAM

## 2025-01-17 PROCEDURE — 1159F MED LIST DOCD IN RCRD: CPT | Performed by: STUDENT IN AN ORGANIZED HEALTH CARE EDUCATION/TRAINING PROGRAM

## 2025-01-17 PROCEDURE — 1125F AMNT PAIN NOTED PAIN PRSNT: CPT | Performed by: STUDENT IN AN ORGANIZED HEALTH CARE EDUCATION/TRAINING PROGRAM

## 2025-01-17 PROCEDURE — 20611 DRAIN/INJ JOINT/BURSA W/US: CPT | Mod: LT | Performed by: STUDENT IN AN ORGANIZED HEALTH CARE EDUCATION/TRAINING PROGRAM

## 2025-01-17 PROCEDURE — 1123F ACP DISCUSS/DSCN MKR DOCD: CPT | Performed by: STUDENT IN AN ORGANIZED HEALTH CARE EDUCATION/TRAINING PROGRAM

## 2025-01-17 PROCEDURE — 3008F BODY MASS INDEX DOCD: CPT | Performed by: STUDENT IN AN ORGANIZED HEALTH CARE EDUCATION/TRAINING PROGRAM

## 2025-01-17 PROCEDURE — 99214 OFFICE O/P EST MOD 30 MIN: CPT | Mod: 25 | Performed by: STUDENT IN AN ORGANIZED HEALTH CARE EDUCATION/TRAINING PROGRAM

## 2025-01-17 RX ORDER — METHYLPREDNISOLONE ACETATE 40 MG/ML
80 INJECTION, SUSPENSION INTRA-ARTICULAR; INTRALESIONAL; INTRAMUSCULAR; SOFT TISSUE
Status: COMPLETED | OUTPATIENT
Start: 2025-01-17 | End: 2025-01-17

## 2025-01-17 RX ORDER — LIDOCAINE HYDROCHLORIDE 10 MG/ML
2 INJECTION, SOLUTION INFILTRATION; PERINEURAL
Status: COMPLETED | OUTPATIENT
Start: 2025-01-17 | End: 2025-01-17

## 2025-01-17 RX ORDER — ROPIVACAINE HYDROCHLORIDE 5 MG/ML
2 INJECTION, SOLUTION EPIDURAL; INFILTRATION; PERINEURAL
Status: COMPLETED | OUTPATIENT
Start: 2025-01-17 | End: 2025-01-17

## 2025-01-17 RX ADMIN — ROPIVACAINE HYDROCHLORIDE 2 ML: 5 INJECTION EPIDURAL; INFILTRATION; PERINEURAL at 11:27

## 2025-01-17 RX ADMIN — LIDOCAINE HYDROCHLORIDE 2 ML: 10 INJECTION, SOLUTION INFILTRATION; PERINEURAL at 11:27

## 2025-01-17 RX ADMIN — METHYLPREDNISOLONE ACETATE 80 MG: 40 INJECTION, SUSPENSION INTRA-ARTICULAR; INTRALESIONAL; INTRAMUSCULAR; INTRASYNOVIAL; SOFT TISSUE at 11:27

## 2025-01-17 ASSESSMENT — PAIN - FUNCTIONAL ASSESSMENT: PAIN_FUNCTIONAL_ASSESSMENT: 0-10

## 2025-01-17 ASSESSMENT — PAIN SCALES - GENERAL: PAINLEVEL_OUTOF10: 4

## 2025-01-17 NOTE — PROGRESS NOTES
Sports Medicine Office Note    Today's Date:  01/17/2025     HPI: Gaurav Fischer is a 74 y.o. male with history of lumbar laminectomy (roughly 25 years ago) and left hip DJD who presents today for evaluation of chronic left hip pain, referred by Dr. Haynes for possible left hip joint ultrasound-guided cortisone injection.  He states he has had pain in his left hip for the last 1-2 years without any associated injury/trauma.  States pain is primarily in left groin with occasional pain over lateral and posterior aspect of the left hip, worse after standing/walking for longer periods and when getting into or out of his car.  States he plays pool regularly, and states he is only been able to play for roughly 1 hour without significant worsening of his pain.  He denies any significant radiation of pain, but does state he has occasional numbness/tingling into his toes on the left side, occasionally on the right side as well.  Denies any focal weakness, or any swelling, redness, warmth, or bruising.  He also states he has some discomfort in posterior aspect of the left thigh over his hamstrings.  He previously followed with physical therapy for low back pain with lumbar radiculopathy, but stopped as he did not have significant improvement, but is interested in returning to PT.  Denies any bowel/bladder incontinence or saddle paresthesias.    He has no other complaints.    Physical Examination:     The Left hip and pelvis are without obvious signs of acute bony deformity or instability. Active and passive range of motion are limited and painful. Log roll is positive. Straight leg raise test/seated slump test elicited mild numbness into left first toe.  Left groin pain elicited with KIANNA/FADIR.  Hip strength is weak as compared to the opposite hip.  There is minimal tenderness over left greater trochanter/gluteal muscle/tendons.  The opposite hip is otherwise nontender and stable. Gait is antalgic, painful, and tandem.  "    Imaging:  Radiographs of the left hip obtained 11/25/2024 were reviewed and revealed severe DJD with cam morphology and calcific degenerative changes, otherwise no acute osseous abnormalities.    Radiographs of the lumbar spine obtained 10/11/2022 were reviewed and revealed moderate L4-S1 degenerative changes, otherwise no acute osseous abnormalities.    The studies were reviewed by me personally in the office today.    === 11/25/24 ===    XR HIP LEFT WITH PELVIS WHEN PERFORMED 2 OR 3 VIEWS    - Impression -  Severe left hip osteoarthritis    MACRO:  None    Signed by: Alberto Onofre 11/26/2024 7:43 PM  Dictation workstation:   [id number]    Problem List Items Addressed This Visit    None  Visit Diagnoses         Codes    Left hip pain     M25.552    Relevant Orders    Point of Care Ultrasound (Completed)    L Inj/Asp: L hip joint    Primary osteoarthritis of left hip     M16.12    Relevant Orders    Referral to Physical Therapy    L Inj/Asp: L hip joint    Lumbar radiculopathy     M54.16    Relevant Orders    Referral to Pain Management          Procedure Note:  After consent was obtained, the left hip was prepped in a sterile fashion. Ultrasound guidance was used to help insure proper needle placement into the  joint , decrease patient discomfort, and decrease collateral damage. The joint was visualized and Depo-Medrol 80 mg with lidocaine 1% 2 mL & ropivacaine 0.5% 2 mL were injected without any complications. Ultrasound images were saved on an internal file for later reference. The patient tolerated the procedure well and the area was cleaned and bandaged.  Patient ID: Gaurav Fischer \"Sidney\" is a 74 y.o. male.    L Inj/Asp: L hip joint on 1/17/2025 11:27 AM  Indications: pain  Details: 22 G needle, ultrasound-guided anterior approach  Medications: 2 mL lidocaine 10 mg/mL (1 %); 80 mg methylPREDNISolone acetate 40 mg/mL; 2 mL ropivacaine 0.5 % (5 mg/mL)  Outcome: tolerated well, no immediate " complications  Procedure, treatment alternatives, risks and benefits explained, specific risks discussed. Consent was given by the patient. Immediately prior to procedure a time out was called to verify the correct patient, procedure, equipment, support staff and site/side marked as required. Patient was prepped and draped in the usual sterile fashion.         Assessment and Plan:    We reviewed the exam and imaging findings and discussed the conservative and surgical treatment options. We agreed to a diagnostic and hopefully therapeutic cortisone injection into the left hip joint.   He tolerated this well. Activity modifications were reviewed.  He will keep any scheduled follow-up with Dr. Haynes. Physical therapy referral provided and discussed the importance of regular home exercises.  Recommended prescription doses of Tylenol and encouraged use of ice or heat as needed for acute flares of pain.  Provided referral to pain management for evaluation of chronic low back pain with symptoms of lumbar radiculopathy versus lower extremity neuropathy.  Discussed with patient that we may consider repeating cortisone injection in 3 months or more if he gets adequate relief and pain returns.  Plan for follow-up in 3 months for re-evaluation, otherwise may follow-up sooner if any new concerns arise.  Discussed this plan with the patient who is understanding and agreeable.    **This note was dictated using Dragon speech recognition software and was not corrected for spelling or grammatical errors**.    Nabor Grimaldo DO  Primary Care Sports Medicine  [facility] [place]

## 2025-01-17 NOTE — H&P (VIEW-ONLY)
Sports Medicine Office Note    Today's Date:  01/17/2025     HPI: Gaurav Fischer is a 74 y.o. male with history of lumbar laminectomy (roughly 25 years ago) and left hip DJD who presents today for evaluation of chronic left hip pain, referred by Dr. Haynes for possible left hip joint ultrasound-guided cortisone injection.  He states he has had pain in his left hip for the last 1-2 years without any associated injury/trauma.  States pain is primarily in left groin with occasional pain over lateral and posterior aspect of the left hip, worse after standing/walking for longer periods and when getting into or out of his car.  States he plays pool regularly, and states he is only been able to play for roughly 1 hour without significant worsening of his pain.  He denies any significant radiation of pain, but does state he has occasional numbness/tingling into his toes on the left side, occasionally on the right side as well.  Denies any focal weakness, or any swelling, redness, warmth, or bruising.  He also states he has some discomfort in posterior aspect of the left thigh over his hamstrings.  He previously followed with physical therapy for low back pain with lumbar radiculopathy, but stopped as he did not have significant improvement, but is interested in returning to PT.  Denies any bowel/bladder incontinence or saddle paresthesias.    He has no other complaints.    Physical Examination:     The Left hip and pelvis are without obvious signs of acute bony deformity or instability. Active and passive range of motion are limited and painful. Log roll is positive. Straight leg raise test/seated slump test elicited mild numbness into left first toe.  Left groin pain elicited with KIANNA/FADIR.  Hip strength is weak as compared to the opposite hip.  There is minimal tenderness over left greater trochanter/gluteal muscle/tendons.  The opposite hip is otherwise nontender and stable. Gait is antalgic, painful, and tandem.  "    Imaging:  Radiographs of the left hip obtained 11/25/2024 were reviewed and revealed severe DJD with cam morphology and calcific degenerative changes, otherwise no acute osseous abnormalities.    Radiographs of the lumbar spine obtained 10/11/2022 were reviewed and revealed moderate L4-S1 degenerative changes, otherwise no acute osseous abnormalities.    The studies were reviewed by me personally in the office today.    === 11/25/24 ===    XR HIP LEFT WITH PELVIS WHEN PERFORMED 2 OR 3 VIEWS    - Impression -  Severe left hip osteoarthritis    MACRO:  None    Signed by: Alberto Onofre 11/26/2024 7:43 PM  Dictation workstation:   [id number]    Problem List Items Addressed This Visit    None  Visit Diagnoses         Codes    Left hip pain     M25.552    Relevant Orders    Point of Care Ultrasound (Completed)    L Inj/Asp: L hip joint    Primary osteoarthritis of left hip     M16.12    Relevant Orders    Referral to Physical Therapy    L Inj/Asp: L hip joint    Lumbar radiculopathy     M54.16    Relevant Orders    Referral to Pain Management          Procedure Note:  After consent was obtained, the left hip was prepped in a sterile fashion. Ultrasound guidance was used to help insure proper needle placement into the  joint , decrease patient discomfort, and decrease collateral damage. The joint was visualized and Depo-Medrol 80 mg with lidocaine 1% 2 mL & ropivacaine 0.5% 2 mL were injected without any complications. Ultrasound images were saved on an internal file for later reference. The patient tolerated the procedure well and the area was cleaned and bandaged.  Patient ID: Gaurav Fischer \"Sidney\" is a 74 y.o. male.    L Inj/Asp: L hip joint on 1/17/2025 11:27 AM  Indications: pain  Details: 22 G needle, ultrasound-guided anterior approach  Medications: 2 mL lidocaine 10 mg/mL (1 %); 80 mg methylPREDNISolone acetate 40 mg/mL; 2 mL ropivacaine 0.5 % (5 mg/mL)  Outcome: tolerated well, no immediate " complications  Procedure, treatment alternatives, risks and benefits explained, specific risks discussed. Consent was given by the patient. Immediately prior to procedure a time out was called to verify the correct patient, procedure, equipment, support staff and site/side marked as required. Patient was prepped and draped in the usual sterile fashion.         Assessment and Plan:    We reviewed the exam and imaging findings and discussed the conservative and surgical treatment options. We agreed to a diagnostic and hopefully therapeutic cortisone injection into the left hip joint.   He tolerated this well. Activity modifications were reviewed.  He will keep any scheduled follow-up with Dr. Haynes. Physical therapy referral provided and discussed the importance of regular home exercises.  Recommended prescription doses of Tylenol and encouraged use of ice or heat as needed for acute flares of pain.  Provided referral to pain management for evaluation of chronic low back pain with symptoms of lumbar radiculopathy versus lower extremity neuropathy.  Discussed with patient that we may consider repeating cortisone injection in 3 months or more if he gets adequate relief and pain returns.  Plan for follow-up in 3 months for re-evaluation, otherwise may follow-up sooner if any new concerns arise.  Discussed this plan with the patient who is understanding and agreeable.    **This note was dictated using Dragon speech recognition software and was not corrected for spelling or grammatical errors**.    Nabor Grimaldo DO  Primary Care Sports Medicine  [facility] [place]

## 2025-01-23 ENCOUNTER — ANESTHESIA EVENT (OUTPATIENT)
Dept: OPERATING ROOM | Facility: HOSPITAL | Age: 75
End: 2025-01-23
Payer: MEDICARE

## 2025-01-23 RX ORDER — ONDANSETRON HYDROCHLORIDE 2 MG/ML
4 INJECTION, SOLUTION INTRAVENOUS ONCE AS NEEDED
Status: CANCELLED | OUTPATIENT
Start: 2025-01-23

## 2025-01-23 RX ORDER — DROPERIDOL 2.5 MG/ML
0.62 INJECTION, SOLUTION INTRAMUSCULAR; INTRAVENOUS ONCE AS NEEDED
Status: CANCELLED | OUTPATIENT
Start: 2025-01-23

## 2025-01-24 ENCOUNTER — ANESTHESIA (OUTPATIENT)
Dept: OPERATING ROOM | Facility: HOSPITAL | Age: 75
End: 2025-01-24
Payer: MEDICARE

## 2025-01-24 ENCOUNTER — APPOINTMENT (OUTPATIENT)
Dept: OPERATING ROOM | Facility: HOSPITAL | Age: 75
End: 2025-01-24
Payer: MEDICARE

## 2025-01-24 ENCOUNTER — HOSPITAL ENCOUNTER (OUTPATIENT)
Dept: OPERATING ROOM | Facility: HOSPITAL | Age: 75
Setting detail: OUTPATIENT SURGERY
Discharge: HOME | End: 2025-01-24
Payer: MEDICARE

## 2025-01-24 VITALS
RESPIRATION RATE: 18 BRPM | WEIGHT: 208.34 LBS | TEMPERATURE: 97.3 F | HEART RATE: 80 BPM | DIASTOLIC BLOOD PRESSURE: 70 MMHG | HEIGHT: 69 IN | OXYGEN SATURATION: 98 % | SYSTOLIC BLOOD PRESSURE: 140 MMHG | BODY MASS INDEX: 30.86 KG/M2

## 2025-01-24 DIAGNOSIS — D64.9 ANEMIA, UNSPECIFIED TYPE: ICD-10-CM

## 2025-01-24 PROCEDURE — 3600000007 HC OR TIME - EACH INCREMENTAL 1 MINUTE - PROCEDURE LEVEL TWO: Performed by: ANESTHESIOLOGY

## 2025-01-24 PROCEDURE — 3600000002 HC OR TIME - INITIAL BASE CHARGE - PROCEDURE LEVEL TWO: Performed by: ANESTHESIOLOGY

## 2025-01-24 PROCEDURE — 7100000010 HC PHASE TWO TIME - EACH INCREMENTAL 1 MINUTE: Performed by: ANESTHESIOLOGY

## 2025-01-24 PROCEDURE — 7100000009 HC PHASE TWO TIME - INITIAL BASE CHARGE: Performed by: ANESTHESIOLOGY

## 2025-01-24 PROCEDURE — 2500000004 HC RX 250 GENERAL PHARMACY W/ HCPCS (ALT 636 FOR OP/ED)

## 2025-01-24 PROCEDURE — 3700000001 HC GENERAL ANESTHESIA TIME - INITIAL BASE CHARGE: Performed by: ANESTHESIOLOGY

## 2025-01-24 PROCEDURE — 2500000004 HC RX 250 GENERAL PHARMACY W/ HCPCS (ALT 636 FOR OP/ED): Performed by: ANESTHESIOLOGY

## 2025-01-24 PROCEDURE — 3700000002 HC GENERAL ANESTHESIA TIME - EACH INCREMENTAL 1 MINUTE: Performed by: ANESTHESIOLOGY

## 2025-01-24 RX ORDER — SODIUM CHLORIDE, SODIUM LACTATE, POTASSIUM CHLORIDE, CALCIUM CHLORIDE 600; 310; 30; 20 MG/100ML; MG/100ML; MG/100ML; MG/100ML
20 INJECTION, SOLUTION INTRAVENOUS CONTINUOUS
Status: DISCONTINUED | OUTPATIENT
Start: 2025-01-24 | End: 2025-01-25 | Stop reason: HOSPADM

## 2025-01-24 RX ORDER — PROPOFOL 10 MG/ML
INJECTION, EMULSION INTRAVENOUS AS NEEDED
Status: DISCONTINUED | OUTPATIENT
Start: 2025-01-24 | End: 2025-01-24

## 2025-01-24 RX ADMIN — PROPOFOL 40 MG: 10 INJECTION, EMULSION INTRAVENOUS at 09:19

## 2025-01-24 RX ADMIN — PROPOFOL 30 MG: 10 INJECTION, EMULSION INTRAVENOUS at 09:34

## 2025-01-24 RX ADMIN — PROPOFOL 40 MG: 10 INJECTION, EMULSION INTRAVENOUS at 09:30

## 2025-01-24 RX ADMIN — PROPOFOL 20 MG: 10 INJECTION, EMULSION INTRAVENOUS at 09:39

## 2025-01-24 RX ADMIN — SODIUM CHLORIDE, POTASSIUM CHLORIDE, SODIUM LACTATE AND CALCIUM CHLORIDE 20 ML/HR: 600; 310; 30; 20 INJECTION, SOLUTION INTRAVENOUS at 08:48

## 2025-01-24 RX ADMIN — PROPOFOL 30 MG: 10 INJECTION, EMULSION INTRAVENOUS at 09:43

## 2025-01-24 RX ADMIN — PROPOFOL 40 MG: 10 INJECTION, EMULSION INTRAVENOUS at 09:22

## 2025-01-24 SDOH — HEALTH STABILITY: MENTAL HEALTH: CURRENT SMOKER: 0

## 2025-01-24 ASSESSMENT — PAIN SCALES - GENERAL
PAINLEVEL_OUTOF10: 0 - NO PAIN
PAINLEVEL_OUTOF10: 0 - NO PAIN

## 2025-01-24 ASSESSMENT — PAIN - FUNCTIONAL ASSESSMENT: PAIN_FUNCTIONAL_ASSESSMENT: 0-10

## 2025-01-24 NOTE — DISCHARGE INSTRUCTIONS
Patient Instructions after a Colonoscopy      The anesthetics, sedatives or narcotics which were given to you today will be acting in your body for the next 24 hours, so you might feel a little sleepy or groggy.  This feeling should slowly wear off. Carefully read and follow the instructions.     You received sedation today:  - Do not drive or operate any machinery or power tools of any kind.   - No alcoholic beverages today, not even beer or wine.  - Do not make any important decisions or sign any legal documents.  - No over the counter medications that contain alcohol or that may cause drowsiness.  - Do not make any important decisions or sign any legal documents.  - Make sure you have someone with you for first 24 hours.    While it is common to experience mild to moderate abdominal distention, gas, or belching after your procedure, if any of these symptoms occur following discharge from the GI Lab or within one week of having your procedure, call the [facility] to be advised whether a visit to your nearest Urgent Care or Emergency Department is indicated.  Take this paper with you if you go.     - If you develop an allergic reaction to the medications that were given during your procedure such as difficulty breathing, rash, hives, severe nausea, vomiting or lightheadedness.  - If you experience chest pain, shortness of breath, severe abdominal pain, fevers and chills.  -If you develop signs and symptoms of bleeding such as blood in your spit, if your stools turn black, tarry, or bloody  - If you have not urinated within 8 hours following your procedure.  - If your IV site becomes painful, red, inflamed, or looks infected.    If you received a biopsy/polypectomy/sphincterotomy the following instructions apply below:    __ Do not use Aspirin containing products, non-steroidal medications or anti-coagulants for one week following your procedure. (Examples of these types of medications are: Advil,  Arthrotec, Aleve, Coumadin, Ecotrin, Heparin, Ibuprofen, Indocin, Motrin, Naprosyn, Nuprin, Plavix, Vioxx, and Voltarin, or their generic forms.  This list is not all-inclusive.  Check with your physician or pharmacist before resuming medications.)   __ Eat a soft diet today.  Avoid foods that are poorly digested for the next 24 hours.  These foods would include: nuts, beans, lettuce, red meats, and fried foods. Start with liquids and advance your diet as tolerated, gradually work up to eating solids.   __ Do not have a Barium Study or Enema for one week.    Your physician recommends the additional following instructions:    -You have a contact number available for emergencies. The signs and symptoms of potential delayed complications were discussed with you. You may return to normal activities tomorrow.  -Resume your previous diet.  -Continue your present medications.   -We are waiting for your pathology results.  -Your physician has recommended a repeat colonoscopy (date to be determined after pending pathology results are reviewed) for surveillance based on pathology results.  -The findings and recommendations have been discussed with you.  -The findings and recommendations were discussed with your family.  - Please see Medication Reconciliation Form for new medication/medications prescribed.       If you experience any problems or have any questions following discharge from the GI Lab, please call:        Nurse Signature                                                                        Date___________________                                                                            Patient/Responsible Party Signature                                        Date___________________

## 2025-01-24 NOTE — ANESTHESIA PREPROCEDURE EVALUATION
"Patient: Gaurav Fischer \"Rich\"    Procedure Information       Date/Time: 01/24/25 0930    Scheduled providers: Dang Clemente MD; Niko Gates MD    Procedure: COLONOSCOPY    Location: [facility] OR          Vitals:    01/24/25 0820   BP: 163/86   Pulse: 91   Resp: 16   Temp: 37.2 °C (99 °F)   SpO2: 98%       Past Surgical History:   Procedure Laterality Date    APPENDECTOMY  03/03/2015    Appendectomy    COLONOSCOPY  03/03/2015    Complete Colonoscopy    HERNIA REPAIR  03/03/2015    Hernia Repair    OTHER SURGICAL HISTORY  02/17/2017    Laminectomy Decompressive Up To Two Lumbar Segments    OTHER SURGICAL HISTORY  04/08/2021    Back surgery    SINUS SURGERY  03/03/2015    Sinus Surgery    TONSILLECTOMY  03/03/2015    Tonsillectomy    TOTAL THYROIDECTOMY  01/05/2022    Thyroid Surgery Total Thyroidectomy     Past Medical History:   Diagnosis Date    Dependence on other enabling machines and devices     History of continuous positive airway pressure (CPAP) therapy at home    Encounter for general adult medical examination without abnormal findings 07/12/2019    Welcome to Medicare preventive visit    Hip pain, acute, right     Low back pain     Personal history of other endocrine, nutritional and metabolic disease 03/03/2015    History of type 2 multiple endocrine neoplasia (MEN)       Current Outpatient Medications:     amLODIPine (Norvasc) 5 mg tablet, Take 1 tablet (5 mg) by mouth once daily., Disp: 90 tablet, Rfl: 1    cetirizine (ZyrTEC) 10 mg tablet, Take 1 tablet (10 mg) by mouth if needed., Disp: , Rfl:     desoximetasone (Topicort) 0.25 % cream, Desoximetasone 0.25 % External Cream APPLY AND GENTLY MASSAGE INTO AFFECTED AREA(S) TWICE DAILY. as needed Refills: 0 Start : 26-Oct-2016 Active, Disp: , Rfl:     famotidine (Pepcid) 20 mg tablet, Take 1 tablet (20 mg) by mouth once daily., Disp: , Rfl:     hydrocortisone 1 % ointment, Apply topically if needed., Disp: , Rfl:     levothyroxine " (Synthroid, Levoxyl) 112 mcg tablet, TAKE 1 TABLET (112 MCG) BY MOUTH ONCE DAILY IN THE MORNING. TAKE ON EMPTY STOMACH, Disp: 90 tablet, Rfl: 2    lovastatin (Mevacor) 40 mg tablet, Take 1 tablet (40 mg) by mouth once daily at bedtime., Disp: 90 tablet, Rfl: 2    neomycin-polymyxin-pramoxine (Neosporin) 3.5-10,000-10 mg-unit-mg/gram cream, Apply topically., Disp: , Rfl:     triamcinolone (Nasacort) 55 mcg nasal inhaler, Administer into affected nostril(s)., Disp: , Rfl:     MELATONIN ORAL, Take 1 mg by mouth. Take as directed, Disp: , Rfl:     meloxicam (Mobic) 15 mg tablet, Take 1 tablet (15 mg) by mouth once daily. To take with food and 1 glass of water (Patient not taking: Reported on 1/17/2025), Disp: 30 tablet, Rfl: 1    tadalafil (Cialis) 10 mg tablet, Take 1 tablet (10 mg) by mouth if needed for erectile dysfunction. (Patient not taking: Reported on 1/24/2025), Disp: 30 tablet, Rfl: 11  Prior to Admission medications    Medication Sig Start Date End Date Taking? Authorizing Provider   amLODIPine (Norvasc) 5 mg tablet Take 1 tablet (5 mg) by mouth once daily. 8/29/24  Yes Renetta Barnett MD   cetirizine (ZyrTEC) 10 mg tablet Take 1 tablet (10 mg) by mouth if needed. 3/3/15  Yes Historical Provider, MD   desoximetasone (Topicort) 0.25 % cream Desoximetasone 0.25 % External Cream APPLY AND GENTLY MASSAGE INTO AFFECTED AREA(S) TWICE DAILY. as needed Refills: 0 Start : 26-Oct-2016 Active 10/26/16  Yes Historical Provider, MD   famotidine (Pepcid) 20 mg tablet Take 1 tablet (20 mg) by mouth once daily.   Yes Historical Provider, MD   hydrocortisone 1 % ointment Apply topically if needed.   Yes Historical Provider, MD   levothyroxine (Synthroid, Levoxyl) 112 mcg tablet TAKE 1 TABLET (112 MCG) BY MOUTH ONCE DAILY IN THE MORNING. TAKE ON EMPTY STOMACH 12/5/24  Yes Renetta Barnett MD   lovastatin (Mevacor) 40 mg tablet Take 1 tablet (40 mg) by mouth once daily at bedtime. 4/4/24  Yes Renetta Barnett MD    neomycin-polymyxin-pramoxine (Neosporin) 3.5-10,000-10 mg-unit-mg/gram cream Apply topically.   Yes Historical Provider, MD   triamcinolone (Nasacort) 55 mcg nasal inhaler Administer into affected nostril(s).   Yes Historical Provider, MD   MELATONIN ORAL Take 1 mg by mouth. Take as directed    Historical Provider, MD   meloxicam (Mobic) 15 mg tablet Take 1 tablet (15 mg) by mouth once daily. To take with food and 1 glass of water  Patient not taking: Reported on 1/17/2025 12/6/24 12/6/25  Renetta Barnett MD   tadalafil (Cialis) 10 mg tablet Take 1 tablet (10 mg) by mouth if needed for erectile dysfunction.  Patient not taking: Reported on 1/24/2025 5/21/24 5/21/25  Faby Tejada MD   gabapentin (Neurontin) 300 mg capsule Take 1 capsule (300 mg) by mouth 3 times a day. 11/25/24 1/24/25  Renetta Barnett MD     Allergies   Allergen Reactions    Sulfamethoxazole-Trimethoprim Anaphylaxis     patient states throat swells    Griseofulvin Itching     antifungals    Pollen Extracts Other     sneezing     Social History     Tobacco Use    Smoking status: Former     Types: Cigars    Smokeless tobacco: Never   Substance Use Topics    Alcohol use: Yes     Alcohol/week: 3.0 standard drinks of alcohol     Types: 3 Cans of beer per week     Comment: 1/2 BEER OR GLASS OF WINE NIGHTLY         Chemistry    Lab Results   Component Value Date/Time     11/18/2024 0948    K 4.4 11/18/2024 0948     11/18/2024 0948    CO2 25 11/18/2024 0948    BUN 29 (H) 11/18/2024 0948    CREATININE 1.08 11/18/2024 0948    Lab Results   Component Value Date/Time    CALCIUM 8.9 11/18/2024 0948    ALKPHOS 84 11/18/2024 0948    AST 18 11/18/2024 0948    ALT 14 11/18/2024 0948    BILITOT 0.4 11/18/2024 0948          Lab Results   Component Value Date/Time    WBC 4.9 11/18/2024 0948    HGB 12.1 (L) 12/20/2024 1418    HCT 36.5 (L) 12/20/2024 1418     11/18/2024 0948     Lab Results   Component Value Date/Time    PROTIME 10.9  08/14/2024 1106    INR 1.0 08/14/2024 1106     Encounter Date: 09/16/24   ECG 12 lead (Clinic Performed)   Result Value    Ventricular Rate 68    Atrial Rate 68    ID Interval 188    QRS Duration 94    QT Interval 394    QTC Calculation(Bazett) 418    P Axis 6    R Axis 2    T Axis 59    QRS Count 11    Q Onset 226    P Onset 132    P Offset 192    T Offset 423    QTC Fredericia 411    Narrative    Normal sinus rhythm  Normal ECG  When compared with ECG of 12-AUG-2024 12:19,  No significant change was found  Confirmed by Quincy Benavides ([id number]) on 10/19/2024 3:15:40 PM     No results found for this or any previous visit from the past 1095 days.      Relevant Problems   Cardiac   (+) Other hyperlipidemia   (+) Primary hypertension      Pulmonary   (+) Dyspnea on exertion      GI   (+) Esophageal reflux      /Renal   (+) BPH with obstruction/lower urinary tract symptoms   (+) Benign prostatic hyperplasia with urinary frequency      Liver   (+) Alpha-1-antitrypsin deficiency (Multi)   (+) Gallstones      Endocrine   (+) Acquired hypothyroidism   (+) Medullary thyroid carcinoma (Multi)      Hematology   (+) Anemia, deficiency   (+) Iron deficiency anemia      Musculoskeletal   (+) Chronic left-sided low back pain with left-sided sciatica   (+) Chronic low back pain   (+) Primary osteoarthritis of lumbar spine      HEENT   (+) Chronic ethmoidal sinusitis   (+) Chronic maxillary sinusitis       Clinical information reviewed:   Tobacco  Allergies  Meds   Med Hx  Surg Hx   Fam Hx  Soc Hx        NPO Detail:  No data recorded     Physical Exam    Airway  Mallampati: II     Cardiovascular - normal exam     Dental    Pulmonary    Abdominal            Anesthesia Plan    History of general anesthesia?: yes  History of complications of general anesthesia?: no    ASA 3     MAC     The patient is not a current smoker.  Patient was not previously instructed to abstain from smoking on day of procedure.  Patient did not smoke on  day of procedure.  Education provided regarding risk of obstructive sleep apnea.  intravenous induction   Anesthetic plan and risks discussed with patient.    Plan discussed with CRNA.

## 2025-01-24 NOTE — ANESTHESIA POSTPROCEDURE EVALUATION
"Patient: Gaurav Fischer \"Rich\"    Procedure Summary       Date: 01/24/25 Room / Location: [facility] OR    Anesthesia Start: 0917 Anesthesia Stop: 0949    Procedure: COLONOSCOPY Diagnosis: Anemia, unspecified type    Scheduled Providers: Dang Clemente MD; Niko Gates MD Responsible Provider: Niko Gates MD    Anesthesia Type: MAC ASA Status: 3            Anesthesia Type: MAC    Vitals Value Taken Time   /70 01/24/25 1002   Temp 36.3 °C (97.3 °F) 01/24/25 0947   Pulse 80 01/24/25 1002   Resp 18 01/24/25 1002   SpO2 98 % 01/24/25 1002       Anesthesia Post Evaluation    Patient location during evaluation: PACU  Patient participation: complete - patient participated  Level of consciousness: awake  Pain management: adequate  Multimodal analgesia pain management approach  Airway patency: patent  Two or more strategies used to mitigate risk of obstructive sleep apnea  Cardiovascular status: acceptable  Respiratory status: acceptable  Hydration status: acceptable  Postoperative Nausea and Vomiting: none        No notable events documented.    "

## 2025-01-29 DIAGNOSIS — M25.559 HIP PAIN, UNSPECIFIED LATERALITY: ICD-10-CM

## 2025-01-29 RX ORDER — MELOXICAM 15 MG/1
15 TABLET ORAL DAILY
Qty: 30 TABLET | Refills: 1 | Status: SHIPPED | OUTPATIENT
Start: 2025-01-29 | End: 2026-01-29

## 2025-01-30 LAB
LABORATORY COMMENT REPORT: NORMAL
PATH REPORT.FINAL DX SPEC: NORMAL
PATH REPORT.GROSS SPEC: NORMAL
PATH REPORT.RELEVANT HX SPEC: NORMAL
PATH REPORT.TOTAL CANCER: NORMAL

## 2025-02-06 ENCOUNTER — OFFICE VISIT (OUTPATIENT)
Dept: PAIN MEDICINE | Facility: HOSPITAL | Age: 75
End: 2025-02-06
Payer: MEDICARE

## 2025-02-06 DIAGNOSIS — M54.16 LUMBAR RADICULOPATHY: ICD-10-CM

## 2025-02-06 DIAGNOSIS — G62.89 OTHER POLYNEUROPATHY: ICD-10-CM

## 2025-02-06 PROCEDURE — 1125F AMNT PAIN NOTED PAIN PRSNT: CPT | Performed by: ANESTHESIOLOGY

## 2025-02-06 PROCEDURE — 99204 OFFICE O/P NEW MOD 45 MIN: CPT | Performed by: ANESTHESIOLOGY

## 2025-02-06 PROCEDURE — 1123F ACP DISCUSS/DSCN MKR DOCD: CPT | Performed by: ANESTHESIOLOGY

## 2025-02-06 PROCEDURE — 1159F MED LIST DOCD IN RCRD: CPT | Performed by: ANESTHESIOLOGY

## 2025-02-06 PROCEDURE — 99214 OFFICE O/P EST MOD 30 MIN: CPT | Performed by: ANESTHESIOLOGY

## 2025-02-06 RX ORDER — DULOXETIN HYDROCHLORIDE 30 MG/1
30 CAPSULE, DELAYED RELEASE ORAL DAILY
Qty: 14 CAPSULE | Refills: 0 | Status: SHIPPED | OUTPATIENT
Start: 2025-02-06 | End: 2026-02-06

## 2025-02-06 RX ORDER — DULOXETIN HYDROCHLORIDE 60 MG/1
60 CAPSULE, DELAYED RELEASE ORAL DAILY
Qty: 30 CAPSULE | Refills: 11 | Status: SHIPPED | OUTPATIENT
Start: 2025-02-06 | End: 2026-02-06

## 2025-02-06 ASSESSMENT — PAIN SCALES - GENERAL: PAINLEVEL_OUTOF10: 7

## 2025-02-07 NOTE — PROGRESS NOTES
Chief Complaint   Patient presents with    Extremity Pain    Back Pain        HPI   Mr. Fischer is a 74-year-old male with a history of left-sided hip pain and known severe left hip osteoarthritis but also pain that is in the distal extremities predominantly.  He does have some back pain but the majority of his pain he feels is in the feet and going up to the shins/knees bilaterally.  The patient says that his symptoms began in late August after he had a prostate procedure.  He said he really noted in mid September that he had numbness in his lower extremities, initially more prominent in the left big toe but then bilaterally.  He notes that the distal legs are the most significant from the knees down.  He does note that he does struggle with back pain intermittently though it is not severe and he did have some hamstring symptoms in early 2024 but that is not a majority of his overall symptoms.  He says that when he is lying down or reclining he feels numbness in his legs.  He also feels better if he is lying on his side.  In general symptoms can be worse at night and with less activity.  Symptoms are numbness, tingling, nerve symptoms of numbness more than pain.  Patient notes that he can walk is much as he wants and stand is much as he wants.    ROS: 13 point review of systems is complete and is negative listed above in HPI    Past Medical History:   Diagnosis Date    Dependence on other enabling machines and devices     History of continuous positive airway pressure (CPAP) therapy at home    Encounter for general adult medical examination without abnormal findings 07/12/2019    Welcome to Medicare preventive visit    Hip pain, acute, right     Low back pain     Personal history of other endocrine, nutritional and metabolic disease 03/03/2015    History of type 2 multiple endocrine neoplasia (MEN)       Past Surgical History:   Procedure Laterality Date    APPENDECTOMY  03/03/2015    Appendectomy    COLONOSCOPY   03/03/2015    Complete Colonoscopy    HERNIA REPAIR  03/03/2015    Hernia Repair    OTHER SURGICAL HISTORY  02/17/2017    Laminectomy Decompressive Up To Two Lumbar Segments    OTHER SURGICAL HISTORY  04/08/2021    Back surgery    SINUS SURGERY  03/03/2015    Sinus Surgery    TONSILLECTOMY  03/03/2015    Tonsillectomy    TOTAL THYROIDECTOMY  01/05/2022    Thyroid Surgery Total Thyroidectomy       Family History   Problem Relation Name Age of Onset    Breast cancer Mother      Other (mesothelioma) Father      Breast cancer Sister      Diabetes type II Sister         Social History     Tobacco Use    Smoking status: Former     Types: Cigars    Smokeless tobacco: Never   Vaping Use    Vaping status: Never Used   Substance Use Topics    Alcohol use: Yes     Alcohol/week: 3.0 standard drinks of alcohol     Types: 3 Cans of beer per week     Comment: 1/2 BEER OR GLASS OF WINE NIGHTLY    Drug use: Never       Current Outpatient Medications on File Prior to Visit   Medication Sig Dispense Refill    amLODIPine (Norvasc) 5 mg tablet Take 1 tablet (5 mg) by mouth once daily. 90 tablet 1    cetirizine (ZyrTEC) 10 mg tablet Take 1 tablet (10 mg) by mouth if needed.      desoximetasone (Topicort) 0.25 % cream Desoximetasone 0.25 % External Cream APPLY AND GENTLY MASSAGE INTO AFFECTED AREA(S) TWICE DAILY. as needed Refills: 0 Start : 26-Oct-2016 Active      famotidine (Pepcid) 20 mg tablet Take 1 tablet (20 mg) by mouth once daily.      hydrocortisone 1 % ointment Apply topically if needed.      levothyroxine (Synthroid, Levoxyl) 112 mcg tablet TAKE 1 TABLET (112 MCG) BY MOUTH ONCE DAILY IN THE MORNING. TAKE ON EMPTY STOMACH 90 tablet 2    lovastatin (Mevacor) 40 mg tablet Take 1 tablet (40 mg) by mouth once daily at bedtime. 90 tablet 2    MELATONIN ORAL Take 1 mg by mouth. Take as directed      meloxicam (Mobic) 15 mg tablet TAKE 1 TABLET (15 MG) BY MOUTH ONCE DAILY. TO TAKE WITH FOOD AND 1 GLASS OF WATER 30 tablet 1     neomycin-polymyxin-pramoxine (Neosporin) 3.5-10,000-10 mg-unit-mg/gram cream Apply topically.      tadalafil (Cialis) 10 mg tablet Take 1 tablet (10 mg) by mouth if needed for erectile dysfunction. (Patient not taking: Reported on 1/24/2025) 30 tablet 11    triamcinolone (Nasacort) 55 mcg nasal inhaler Administer into affected nostril(s).       No current facility-administered medications on file prior to visit.        Allergies   Allergen Reactions    Sulfamethoxazole-Trimethoprim Anaphylaxis     patient states throat swells    Griseofulvin Itching     antifungals    Pollen Extracts Other     sneezing          Imaging:  Narrative & Impression   Interpreted By:  Alberto Onofre,   STUDY:  XR HIP LEFT WITH PELVIS WHEN PERFORMED 2 OR 3 VIEWS; ;  11/25/2024  11:27 am      INDICATION:  Signs/Symptoms:left hip pain.      ,M25.552 Pain in left hip      COMPARISON:  None.      ACCESSION NUMBER(S):  [id number]      ORDERING CLINICIAN:  MITCH CROSS      FINDINGS:  Left hip, three views      There is severe joint space narrowing with osteophytosis throughout  the left hip. There is no fracture or dislocation      IMPRESSION:  Severe left hip osteoarthritis          MACRO:  None       Narrative & Impression  MRN: [id number]  Patient Name: SUGAR IRVIN     STUDY:  Lumbar spine, five views.  Sacrum and coccyx, three views.     INDICATION:  LOW BACK PAIN  M54.50: Chronic low back pain G89.29:; LOW BACK PAIN  M53.3: Pain in the coccyx.     COMPARISON:  None     ACCESSION NUMBER(S):  [id number]; [id number]     ORDERING CLINICIAN:  MITCH CROSS     FINDINGS:  Lumbar spine:  Alignment is within normal limits.  Moderate spondylosis and facet arthropathy at L4-5 and L5-S1 with  disc height loss and facet hypertrophy/sclerosis and osteophytes.  No spondylolysis on the oblique views.  Vertebral body heights are preserved. Posterior elements are intact.     Sacrum and coccyx:  No acute fracture or malalignment. Bilateral sacroiliac  joints appear  to be within normal limits.     IMPRESSION:  1. Moderate L4-5 and L5-S1 degenerative changes.  2. Unremarkable radiographs of the sacrum and coccyx.    Physical Exam:  Gen.: Patient appears to be stated age, fair hygiene  Eyes: Pupils are symmetric, conjunctiva is nonicteric and lids without obvious drooping or rash  ENT: Hearing is grossly intact, external ears and nose appear to be without deformity or rash. No lesions or masses noted.  Neck: No JVD noted, tracheal position is midline  Respiratory: No gasping or shortness of breath noted, no use of accessory muscles noted  Cardiovascular: Extremity show no edema or varicosities  Lymph: No lymphadenopathy noted in the anterior cervical regions bilaterally  Skin no rashes or open lesions or ulcers identified on the skin  Musculoskeletal: Gait is grossly normal, intact strength, normal reflex in the lower extremities bilaterally  Neurologic: Cranial nerves II through XII are grossly intact, decree sensation to temperature in a stocking glove distribution to just below the knees bilaterally  Psychiatric:  Patient is alert and oriented x3    Impression/Plan:  74-year-old male with a history of severe hip osteoarthritis on the left, degenerative changes in the lumbar spine with some intermittent back pain and likely some component of radicular symptoms, that being said the predominant symptoms he experiences are in the distal extremity and he does have temperature deficit in a stocking glove distributional and to just below the knees bilaterally.    Patient does not have any specific cause or diagnosis that I would be directly attributable to a peripheral neuropathy/small fiber neuropathy.  Notes very remote history of drinking heavily, decades ago.    -We discussed initiation of Cymbalta.  Side effect profile and risk reviewed.  He seems to be most symptomatic at this time from a peripheral neuropathy.  If we stop the medication later we could wean it  down slowly rather than stop it abruptly.    -I do think there is a component that stems from his back though that is a more minimal component at this time.  Potentially we could consider an epidural in the future if that were to be more a predominant complaint.  He definitely has degenerative changes in his spine and based on history has likely had symptoms for stemming from his spine in the past.    -Encourage physical therapy and core strengthening.    -Could consider neurologic evaluation for workup of etiology of his peripheral neuropathy.

## 2025-02-10 ENCOUNTER — EVALUATION (OUTPATIENT)
Dept: PHYSICAL THERAPY | Facility: CLINIC | Age: 75
End: 2025-02-10
Payer: MEDICARE

## 2025-02-10 DIAGNOSIS — R29.898 WEAKNESS OF LEFT HIP: ICD-10-CM

## 2025-02-10 DIAGNOSIS — M25.552 LEFT HIP PAIN: Primary | ICD-10-CM

## 2025-02-10 DIAGNOSIS — M16.12 PRIMARY OSTEOARTHRITIS OF LEFT HIP: ICD-10-CM

## 2025-02-10 PROCEDURE — 97110 THERAPEUTIC EXERCISES: CPT | Mod: GP

## 2025-02-10 PROCEDURE — 97161 PT EVAL LOW COMPLEX 20 MIN: CPT | Mod: GP

## 2025-02-10 NOTE — PROGRESS NOTES
"Physical Therapy  Physical Therapy Orthopedic Evaluation    Patient Name: Gaurav Fischer \"Sidney\"  MRN: [id number]  Today's Date: 2/10/2025    Insurance:  Visit number: 1 of [place]  Authorization info: Yes  Insurance Type: Payor: [employer] / Plan: [facility] MEDICARE ADVANTAGE / Product Type: *No Product type* /    Current Problem  Problem List Items Addressed This Visit             ICD-10-CM    Left hip pain - Primary M25.552    Relevant Orders    Follow Up In Physical Therapy    Weakness of left hip R29.898    Relevant Orders    Follow Up In Physical Therapy     Other Visit Diagnoses         Codes    Primary osteoarthritis of left hip     M16.12    Relevant Orders    Follow Up In Physical Therapy          General:  General  Reason for Referral: L hip pain  Referred By: Nabor Grimaldo DO  Precautions:  Precautions  Precautions Comment: None to PT  Medical History Form: Reviewed (scanned into chart)    Subjective:   SUMEET: Pt reports to evaluation due to L hip pain. Pt recently had an injection in his L hip that has helped with his pain. He will also get a pain into his posterior thigh which may be coming from his back. Pt was having intense pain in his hip with every step prior to his injection.     Previous Med Management: Injection, tylenol     PMH: Nothing related to his hip     Aggravating Factors: stairs, walking longer distances, rising out of a chair/car, rolling over in bed     Relieving Factors: Staying away from stairs and the injection     Pain/Symptom Scale:  Current: 0/10  Worst: 9/10  Best: 0/10  Location/Nature: Groin area and lateral hip and describes as sharp pain    Meds: Tylenol     PLOF: Prior to his injection he was having pain with every step   ADL: Pt needs to take his time getting his socks and shoes on   Work: Retired   Athletics: Walking for exercise   Recreation/ Hobbies: Plays pool     Goals: Pt would like to decrease his pain and gain strength in his hip     Language: English      Red Flags: " Do you have any of the following? No  Fever/chills, unexplained weight changes, dizziness/fainting, unexplained change in bowel or bladder functions, unexplained malaise or muscle weakness, night pain/sweats, numbness or tingling    Objective   Palpation/ Observation   Increased hip musculature tightness upon palpation     Hip ROM   Flexion- R: 110 L: 90  Abduction- R: 30 L: 30  ER- R: 30  L: 25  IR- R: 25 L: 20    Hip MMT  Flexion- R: 4+/5 L: 4/5  Abduction- R: 4+/5 L: 4+/5   Adduction- R: 4+/5 L: 4/5  Extension - R: 4/5 L: 4/5    Outcome Measures:  Other Measures  Lower Extremity Funtional Score (LEFS): 30/80     Treatments:  Access Code: [email or web address]  URL: [email or web address]  Date: 02/10/2025  Prepared by: Matt Childress    Exercises  - Seated Hip Adduction Squeeze with Ball  - 1-2 x daily - 7 x weekly - 3 sets - 6 reps - 10 sec hold  - Seated Isometric Hip Abduction with Belt  - 1-2 x daily - 7 x weekly - 2 sets - 5 reps - 30 sec hold  - Supine Posterior Pelvic Tilt  - 1-2 x daily - 7 x weekly - 3 sets - 10 reps  - Standing Lumbar Extension with Counter  - 6 x daily - 7 x weekly - 1 sets - 10 reps    EDUCATION: Home exercise program, plan of care, activity modifications, pain management, and injury pathology       Assessment:  Patient is a 74 year old male that presents to physical therapy evaluation today due to complaints of L hip pain. Patient impairments include flexibility deficits of L hip, strength deficits in hip musculature, and motor control deficits including lack of hip stability. Due to these impairments, the patients has the following functional limitations: pain with walking longer distances, difficulty walking up stairs, and an overall decrease in functional mobility. Skilled therapy recommended in order to to address their impairments and progress towards the associated functional goals. Prognosis is good secondary to their current presentation and client understanding. The  pt demonstrates a good understanding of their current plan of care, rehab expectations, and prognosis.       Plan:     Planned Interventions include: therapeutic exercise, self-care home management, manual therapy, therapeutic activities, gait training, neuromuscular coordination, vasopneumatic, dry needling, aquatic therapy  Frequency: 1 x Week  Duration: 8 Weeks  Goals: Set and discussed today  Active       PT Problem       PT Goals       Start:  02/10/25       1. Pt will increase LEFS with 65/80 or better in order to demo an increase in L hip function  2. Pt will demo 4+/5 or all hip/knee MMT in order to demo an increase in LE strength   3. Pt will be able to ascend and descend 10 steps with reciprocal gait pattern and proper knee control in order to demo and increase in functional mobility   4. Pt will demo compliance and independence with HEP   5.  Pt will be able to walk for 20 mins with step through gait pattern and non antalgic gait pattern               Plan of care was developed with input and agreement by the patient  Ambulatory Screenings Summary       Screening  Frequency  Date Last Completed   Spiritual and Cultural Beliefs   Screening  each visit or episode of care 1/24/2025   Falls Risk Screening  every ambulatory visit    Pain Screening  annually at primary care visit  2/6/2025   Domestic Violence screening  annually at primary care visit 9/16/2024   Elder Abuse Screening  annually at primary care visit    Depression Screening  annually in the primary care setting 11/25/2024   Suicide Risk Screening  annually in the primary care setting 11/21/2024   Nutrition and Food Insecurity   Screening  at least annually at primary care visit     Key Learner  annually in the primary care setting 1/24/2025   Drug Screen  1/24/2025  8:42 AM   Alcohol Screen  1/24/2025  8:42 AM   Advance Directive  9/16/2024       Time Calculation  Start Time: 0915  Stop Time: 1000  Time Calculation (min): 45 min  PT Evaluation  Time Entry  PT Evaluation (Low) Time Entry: 25 PT Therapeutic Procedures Time Entry  Therapeutic Exercise Time Entry: 15

## 2025-02-24 ENCOUNTER — OFFICE VISIT (OUTPATIENT)
Dept: CARDIOLOGY | Facility: CLINIC | Age: 75
End: 2025-02-24
Payer: MEDICARE

## 2025-02-24 ENCOUNTER — ANCILLARY PROCEDURE (OUTPATIENT)
Dept: CARDIOLOGY | Facility: CLINIC | Age: 75
End: 2025-02-24
Payer: MEDICARE

## 2025-02-24 VITALS
OXYGEN SATURATION: 99 % | SYSTOLIC BLOOD PRESSURE: 130 MMHG | WEIGHT: 200 LBS | RESPIRATION RATE: 18 BRPM | DIASTOLIC BLOOD PRESSURE: 76 MMHG | HEIGHT: 70 IN | BODY MASS INDEX: 28.63 KG/M2 | HEART RATE: 79 BPM

## 2025-02-24 DIAGNOSIS — E78.49 OTHER HYPERLIPIDEMIA: ICD-10-CM

## 2025-02-24 DIAGNOSIS — I73.9 CLAUDICATION, INTERMITTENT (CMS-HCC): ICD-10-CM

## 2025-02-24 DIAGNOSIS — R00.2 PALPITATIONS: ICD-10-CM

## 2025-02-24 DIAGNOSIS — I10 PRIMARY HYPERTENSION: ICD-10-CM

## 2025-02-24 DIAGNOSIS — D50.9 IRON DEFICIENCY ANEMIA, UNSPECIFIED IRON DEFICIENCY ANEMIA TYPE: ICD-10-CM

## 2025-02-24 DIAGNOSIS — R00.2 PALPITATIONS: Primary | ICD-10-CM

## 2025-02-24 LAB
ATRIAL RATE: 79 BPM
BODY SURFACE AREA: 2.11 M2
P AXIS: 86 DEGREES
P OFFSET: 163 MS
P ONSET: 118 MS
PR INTERVAL: 184 MS
Q ONSET: 210 MS
QRS COUNT: 13 BEATS
QRS DURATION: 96 MS
QT INTERVAL: 392 MS
QTC CALCULATION(BAZETT): 449 MS
QTC FREDERICIA: 429 MS
R AXIS: 53 DEGREES
T AXIS: 79 DEGREES
T OFFSET: 406 MS
VENTRICULAR RATE: 79 BPM

## 2025-02-24 PROCEDURE — G2211 COMPLEX E/M VISIT ADD ON: HCPCS | Performed by: INTERNAL MEDICINE

## 2025-02-24 PROCEDURE — 1036F TOBACCO NON-USER: CPT | Performed by: INTERNAL MEDICINE

## 2025-02-24 PROCEDURE — 1123F ACP DISCUSS/DSCN MKR DOCD: CPT | Performed by: INTERNAL MEDICINE

## 2025-02-24 PROCEDURE — 3075F SYST BP GE 130 - 139MM HG: CPT | Performed by: INTERNAL MEDICINE

## 2025-02-24 PROCEDURE — 1126F AMNT PAIN NOTED NONE PRSNT: CPT | Performed by: INTERNAL MEDICINE

## 2025-02-24 PROCEDURE — 99214 OFFICE O/P EST MOD 30 MIN: CPT | Performed by: INTERNAL MEDICINE

## 2025-02-24 PROCEDURE — 3008F BODY MASS INDEX DOCD: CPT | Performed by: INTERNAL MEDICINE

## 2025-02-24 PROCEDURE — 93005 ELECTROCARDIOGRAM TRACING: CPT | Performed by: INTERNAL MEDICINE

## 2025-02-24 PROCEDURE — 3078F DIAST BP <80 MM HG: CPT | Performed by: INTERNAL MEDICINE

## 2025-02-24 PROCEDURE — 93246 EXT ECG>7D<15D RECORDING: CPT

## 2025-02-24 PROCEDURE — 1159F MED LIST DOCD IN RCRD: CPT | Performed by: INTERNAL MEDICINE

## 2025-02-24 RX ORDER — FERROUS SULFATE 137(45) MG
1 TABLET, EXTENDED RELEASE ORAL DAILY
COMMUNITY

## 2025-02-24 ASSESSMENT — ENCOUNTER SYMPTOMS
OCCASIONAL FEELINGS OF UNSTEADINESS: 0
FEVER: 0
EYE DISCHARGE: 0
LIGHT-HEADEDNESS: 0
DEPRESSION: 0
PALPITATIONS: 1
LOSS OF BALANCE: 0
IRREGULAR HEARTBEAT: 1
EYE PAIN: 0
BACK PAIN: 0
DECREASED LIBIDO: 0
PND: 0
CHANGE IN BOWEL HABIT: 0
CHILLS: 0
WHEEZING: 0
NEAR-SYNCOPE: 0
COUGH: 0
ORTHOPNEA: 0
CLAUDICATION: 0
PSYCHIATRIC NEGATIVE: 1
JOINT SWELLING: 0
DYSPNEA ON EXERTION: 0
SHORTNESS OF BREATH: 0
DIZZINESS: 0
ANOREXIA: 0
BLOATING: 0
BLURRED VISION: 0
LOSS OF SENSATION IN FEET: 1
HEMOPTYSIS: 0
DOUBLE VISION: 0
HEADACHES: 0
DECREASED APPETITE: 0
ABDOMINAL PAIN: 0
SPUTUM PRODUCTION: 0
BOWEL INCONTINENCE: 0
ENDOCRINE NEGATIVE: 1
FALLS: 0
DIAPHORESIS: 0

## 2025-02-24 ASSESSMENT — PAIN SCALES - GENERAL: PAINLEVEL_OUTOF10: 0-NO PAIN

## 2025-02-24 NOTE — PROGRESS NOTES
"Chief Complaint:   Palpitations and Follow-up     History Of Present Illness:    Sidney Fischer is a 74 y.o. male presenting with a pertinent medical history notable for essential hypertension, BPH, alpha 1 antitrypsin, dy/sl/ipidemia, dyspnea on exertion, gastroesophageal reflux disease, hypersomnia with obstructive sleep apnea, history of medullary t//hyroid carcinoma who presents to cardiology for follow up of of heart palpitations and presyncope and dyslipidemia with elevated CAC      His history is rather convoluted. Recall   --States that he will have intermittent periods of sensations of a \"pounding heart\" that usually occur in the evening. He notes that these usually occur after day of heavy exertion, such as shoveling snow, moving 50 pound bags of lawn care materials, or watching baseball games in the sun. He reports that he has no chest discomfort, palpitations, heaviness, dyspnea exertion during these episodes, usually occurs right when he is about to go to bed later that night.   He also reports intermittent periods of presyncope, lightheadedness, dizziness that occur while he is in [employer]. States that his symptoms will somewhat chanel when he is able to sit back down. Further, he is very concerned about \"circulation problems\" in his hands and arms. He reports intermittent numbness, tingling as well as change in temperature of his hands. Additionally, he is concerned about \"things building up in my heart\" and he has been doing \"a lot of research about coronary calcium and wonders whether he should have this done\"      Since he was last seen, he reports that many of his symptoms have improved following reduction his his thyroid replacement.Otherwise, no issues. He feels dramatically improved. He has been following his cholesterol levels, and is shocked at how well he is responding to his current medication. He reports no myalgias, arthralgias.     Today ( 2/6/2023) he returns for scheduled follow-up. He " "reports that his dizziness has improved, feels that this was related to his levothyroxine dose. He notes that he has not had any issues with heart palpitations. He has been shooting pool to stay active., goes square dancing every other weekend. States that he had been out of breath several years ago, but no longer. He is able to keep up and enjoys it.     10/30/2023 -- He returns for follow up. He had been doing quiet well until about 1 week prior. He had an episode of heart palpitations where he heart was \"Pounding\" and \"Slow\"  This occurred about the time of COVID vaccine. He notes that he had been in [employer] and walked ~ 7 miles daily. He was able to climb 1000' ascent ( 5500 --> 6500 feet of elevation) without chest pressure or pain, shortness of breath. He had attended a [facility] screening ( [facility]) where his cholesterol panel was obtained showing very acceptable levels with TC < 120. Its is surprising that it wasn't uploaded to his chart.  He is otherwise doing well.     9/16/2024 -- He returns for Follow up.  Since he was last seen, he has continued to do well.  He has been active.  He has gotten out of golf 2 rounds, and feels good about this.  He continues to exercise, and denies any shortness of breath, chest pain, heart palpitations with exercise.  He feels that he is slowing down some but otherwise offers no cardiovascular complaints.    2/24/2025 --> He returns for follow up. He has noted increased heart palpitations.  He has also noticed that he has had increased lower extremity pain, numbness, tingling.  It started within his toes, is now up to his knees bilaterally.  He feels that this may be more associated when he is laying down, resting, feet elevation.  He was started on duloxetine which has been helpful for some of his symptomatology.  He worries whether or not his heart palpitations may represent atrial fibrillation and whether the numbness and tingling may be related to " thrombus burden.  He has also noticed increased symptoms that he associates with chronic iron deficiency anemia nemia, and has started using over-the-counter iron supplementation.      Patient denies chest pain and angina.  Pt denies shortness of breath, dyspnea on exertion, orthopnea, and paroxysmal nocturnal dyspnea.  Pt denies worsening lower extremity edema.  Pt denies palpitations or syncope.  No recent falls.  No fever or chills.  No cough.  No change in bowel or bladder habits.  No travel.  No sick contacts.  No recent travel    12 point review of systems was performed and is otherwise negative.  Past medical history:  As above.  Medications:  Reviewed.  Allergies:  Reviewed.  Social history:    Social History     Tobacco Use    Smoking status: Former     Types: Cigars    Smokeless tobacco: Never   Vaping Use    Vaping status: Never Used   Substance Use Topics    Alcohol use: Yes     Alcohol/week: 3.0 standard drinks of alcohol     Types: 3 Cans of beer per week     Comment: 1/2 BEER OR GLASS OF WINE NIGHTLY/rarely anymore    Drug use: Never       Family history:    Family History   Problem Relation Name Age of Onset    Breast cancer Mother      Other (mesothelioma) Father      Breast cancer Sister      Diabetes type II Sister           Review of Systems   Constitutional: Negative for chills, decreased appetite, diaphoresis and fever.   HENT:  Negative for congestion, ear discharge, ear pain and hearing loss.    Eyes:  Negative for blurred vision, discharge, double vision and pain.   Cardiovascular:  Positive for irregular heartbeat and palpitations. Negative for chest pain, claudication, cyanosis, dyspnea on exertion, leg swelling, near-syncope, orthopnea and paroxysmal nocturnal dyspnea.   Respiratory:  Negative for cough, hemoptysis, shortness of breath, sputum production and wheezing.    Endocrine: Negative.    Hematologic/Lymphatic: Negative for bleeding problem.   Skin: Negative.    Musculoskeletal:   "Negative for arthritis, back pain, falls, joint pain, joint swelling and muscle weakness.   Gastrointestinal:  Negative for bloating, abdominal pain, anorexia, change in bowel habit and bowel incontinence.   Genitourinary:  Negative for bladder incontinence and decreased libido.   Neurological:  Negative for dizziness, headaches, light-headedness and loss of balance.   Psychiatric/Behavioral: Negative.           Last Recorded Vitals:  Vitals:    02/24/25 1015   BP: 130/76   BP Location: Right arm   Patient Position: Sitting   BP Cuff Size: Adult   Pulse: 79   Resp: 18   SpO2: 99%   Weight: 90.7 kg (200 lb)   Height: 1.765 m (5' 9.5\")         Past Medical History:  He has a past medical history of Dependence on other enabling machines and devices, Encounter for general adult medical examination without abnormal findings (07/12/2019), Hip pain, acute, right, Low back pain, and Personal history of other endocrine, nutritional and metabolic disease (03/03/2015).    Past Surgical History:  He has a past surgical history that includes Colonoscopy (03/03/2015); Appendectomy (03/03/2015); Tonsillectomy (03/03/2015); Sinus surgery (03/03/2015); Hernia repair (03/03/2015); Other surgical history (02/17/2017); Other surgical history (04/08/2021); and Total thyroidectomy (01/05/2022).      Social History:  He reports that he has quit smoking. His smoking use included cigars. He has never used smokeless tobacco. He reports current alcohol use of about 3.0 standard drinks of alcohol per week. He reports that he does not use drugs.    Family History:  Family History   Problem Relation Name Age of Onset    Breast cancer Mother      Other (mesothelioma) Father      Breast cancer Sister      Diabetes type II Sister          Allergies:  Sulfamethoxazole-trimethoprim, Other, Griseofulvin, and Pollen extracts    Outpatient Medications:  Current Outpatient Medications   Medication Instructions    amLODIPine (NORVASC) 5 mg, oral, Daily    " "cetirizine (ZYRTEC) 10 mg, As needed    desoximetasone (Topicort) 0.25 % cream Desoximetasone 0.25 % External Cream APPLY AND GENTLY MASSAGE INTO AFFECTED AREA(S) TWICE DAILY. as needed Refills: 0 Start : 26-Oct-2016 Active    DULoxetine (CYMBALTA) 30 mg, oral, Daily, Do not crush or chew. One every evening    DULoxetine (CYMBALTA) 60 mg, oral, Daily, Do not crush or chew.take one every after completing the 30mg dose    famotidine (PEPCID) 20 mg, Daily    ferrous sulfate (Slow Fe) 137 mg (45 mg iron) tablet extended release 1 tablet, Daily    hydrocortisone 1 % ointment As needed    levothyroxine (SYNTHROID, LEVOXYL) 112 mcg, oral, Every morning, Take on empty stomach    lovastatin (MEVACOR) 40 mg, oral, Nightly    MELATONIN ORAL 1 mg    meloxicam (MOBIC) 15 mg, oral, Daily, To take with food and 1 glass of water    neomycin-polymyxin-pramoxine (Neosporin) 3.5-10,000-10 mg-unit-mg/gram cream Apply topically.    tadalafil (CIALIS) 10 mg, oral, As needed    triamcinolone (Nasacort) 55 mcg nasal inhaler Administer into affected nostril(s).       Physical Exam:  /76 (BP Location: Right arm, Patient Position: Sitting, BP Cuff Size: Adult)   Pulse 79   Resp 18   Ht 1.765 m (5' 9.5\")   Wt 90.7 kg (200 lb)   SpO2 99%   BMI 29.11 kg/m²     General Appearance:  Alert, cooperative, no distress, appears stated age   Head:  Normocephalic, without obvious abnormality, atraumatic   Eyes:  PERRL, cboth eyes   Ears:  Normal ears   Nose: Nares normal, septum midline, mucosa normal, no drainage or sinus tenderness   Throat: Lips, mucosa, and tongue normal; teeth and gums normal   Neck: Supple,  no carotid bruit or JVD   Back:   Symmetric, no curvature, ROM normal, no CVA tenderness   Lungs:   Clear to auscultation bilaterally, respirations unlabored   Chest Wall:  No tenderness or deformity   Heart:  Regular rate and rhythm, S1, S2 normal, no murmur, rub or gallop   Abdomen:   Soft, non-tender, bowel sounds active all four " quadrants,  no masses, no organomegaly   Extremities: Extremities normal, atraumatic, no cyanosis or edema   Pulses: 2+ and symmetric   Skin: Skin color, texture, turgor normal, no rashes or lesions   Lymph nodes: Cervical, supraclavicular, and axillary nodes normal   Neurologic: Normal          Last Labs:  CBC -  Lab Results   Component Value Date    WBC 4.9 11/18/2024    HGB 12.1 (L) 12/20/2024    HCT 36.5 (L) 12/20/2024    MCV 82 11/18/2024     11/18/2024       CMP -  Lab Results   Component Value Date    CALCIUM 8.9 11/18/2024    PHOS 3.4 02/06/2023    PROT 6.8 11/18/2024    ALBUMIN 4.3 12/20/2024    AST 18 11/18/2024    ALT 14 11/18/2024    ALKPHOS 84 11/18/2024    BILITOT 0.4 11/18/2024       LIPID PANEL -   Lab Results   Component Value Date    CHOL 156 01/19/2024    TRIG 82 01/19/2024    HDL 45.6 01/19/2024    CHHDL 3.4 01/19/2024    LDLF 69 07/14/2023    VLDL 16 01/19/2024    NHDL 110 01/19/2024       RENAL FUNCTION PANEL -   Lab Results   Component Value Date    GLUCOSE 97 11/18/2024     11/18/2024    K 4.4 11/18/2024     11/18/2024    CO2 25 11/18/2024    ANIONGAP 12 11/18/2024    BUN 29 (H) 11/18/2024    CREATININE 1.08 11/18/2024    GFRMALE 71 07/14/2023    CALCIUM 8.9 11/18/2024    PHOS 3.4 02/06/2023    ALBUMIN 4.3 12/20/2024        Lab Results   Component Value Date    HGBA1C 5.3 12/20/2024       Last Cardiology Tests:  ECG:  In Office EKG       Echo:  05/2021   1. The left ventricular systolic function is normal with a 60-65% estimated ejection fraction.   2. Borderline increased LV mass.   3. Moderately increased left ventricular septal thickness.   4. RVSP within normal limits.      Cardiac Imaging:  CT HEART CALCIUM SCORING WO IV CONTRAST 2/13/2023  FINDINGS:  The score and distribution of calcium in the coronary arteries is as  follows:     .49,  .35,  LCx 0,  RCA 0,     Total   829.84    Lab review: I have personally reviewed the laboratory result(s)   Diagnostic  review: I have personally reviewed the result(s) of the EKG and Echocardiogram .   Imaging review: I have  personally reviewed the result(s) CT Coronary Artery Calcium Scoring     Assessment/Plan   Problem List Items Addressed This Visit             ICD-10-CM    Claudication, intermittent (CMS-HCC) I73.9    Relevant Orders    ECG 12 lead (Clinic Performed) (Completed)    CBC    Comprehensive metabolic panel    Methylmalonic Acid    Vitamin B1, Whole Blood    Vascular US ankle brachial index (BLANCA) without exercise    Iron deficiency anemia D50.9    Relevant Orders    CBC    Comprehensive metabolic panel    Methylmalonic Acid    Vitamin B1, Whole Blood    Iron and TIBC    Other hyperlipidemia E78.49    Relevant Orders    CBC    Comprehensive metabolic panel    Lipid Panel    Lipoprotein a    Palpitations - Primary R00.2    Relevant Orders    ECG 12 lead (Clinic Performed) (Completed)    CBC    Comprehensive metabolic panel    TSH with reflex to Free T4 if abnormal    Holter Or Event Cardiac Monitor    Primary hypertension I10    Relevant Orders    CBC    Comprehensive metabolic panel     Symptoms of possible claudication versus peripheral neuropathy as well as increased heart palpitations with some concern of atrial fibrillation.  -- Given the patient's symptoms and in order to further evaluate possible arrhythmias, we will plan to perform an event monitor.  --Given symptoms of claudication versus peripheral neuropathy, check BLANCA indices  -- Check laboratory studies for monitoring of drug effect as well as anemia evaluation as he reports similar symptoms occurring with prior episodes of anemia.  -- Return 1 to 2 weeks after completion of all cardiac testing at this time    Quincy Benavides DO

## 2025-02-25 ENCOUNTER — HOSPITAL ENCOUNTER (OUTPATIENT)
Dept: VASCULAR MEDICINE | Facility: HOSPITAL | Age: 75
Discharge: HOME | End: 2025-02-25
Payer: MEDICARE

## 2025-02-25 DIAGNOSIS — I73.9 CLAUDICATION, INTERMITTENT (CMS-HCC): ICD-10-CM

## 2025-02-25 PROCEDURE — 93922 UPR/L XTREMITY ART 2 LEVELS: CPT

## 2025-02-25 PROCEDURE — 93922 UPR/L XTREMITY ART 2 LEVELS: CPT | Performed by: SURGERY

## 2025-03-02 LAB
ALBUMIN SERPL-MCNC: 4.9 G/DL (ref 3.6–5.1)
ALP SERPL-CCNC: 80 U/L (ref 35–144)
ALT SERPL-CCNC: 22 U/L (ref 9–46)
ANION GAP SERPL CALCULATED.4IONS-SCNC: 10 MMOL/L (CALC) (ref 7–17)
AST SERPL-CCNC: 26 U/L (ref 10–35)
BILIRUB SERPL-MCNC: 0.8 MG/DL (ref 0.2–1.2)
BUN SERPL-MCNC: 17 MG/DL (ref 7–25)
CALCIUM SERPL-MCNC: 9.2 MG/DL (ref 8.6–10.3)
CHLORIDE SERPL-SCNC: 100 MMOL/L (ref 98–110)
CHOLEST SERPL-MCNC: 129 MG/DL
CHOLEST/HDLC SERPL: 3 (CALC)
CO2 SERPL-SCNC: 27 MMOL/L (ref 20–32)
CREAT SERPL-MCNC: 1 MG/DL (ref 0.7–1.28)
EGFRCR SERPLBLD CKD-EPI 2021: 79 ML/MIN/1.73M2
ERYTHROCYTE [DISTWIDTH] IN BLOOD BY AUTOMATED COUNT: 13 % (ref 11–15)
GLUCOSE SERPL-MCNC: 83 MG/DL (ref 65–139)
HCT VFR BLD AUTO: 43.4 % (ref 38.5–50)
HDLC SERPL-MCNC: 43 MG/DL
HGB BLD-MCNC: 14.7 G/DL (ref 13.2–17.1)
IRON SATN MFR SERPL: 34 % (CALC) (ref 20–48)
IRON SERPL-MCNC: 116 MCG/DL (ref 50–180)
LDLC SERPL CALC-MCNC: 69 MG/DL (CALC)
LPA SERPL-SCNC: 31 NMOL/L
MCH RBC QN AUTO: 29.6 PG (ref 27–33)
MCHC RBC AUTO-ENTMCNC: 33.9 G/DL (ref 32–36)
MCV RBC AUTO: 87.3 FL (ref 80–100)
METHYLMALONATE SERPL-SCNC: 151 NMOL/L (ref 69–390)
NONHDLC SERPL-MCNC: 86 MG/DL (CALC)
PLATELET # BLD AUTO: 342 THOUSAND/UL (ref 140–400)
PMV BLD REES-ECKER: 9.1 FL (ref 7.5–12.5)
POTASSIUM SERPL-SCNC: 4.4 MMOL/L (ref 3.5–5.3)
PROT SERPL-MCNC: 7.2 G/DL (ref 6.1–8.1)
RBC # BLD AUTO: 4.97 MILLION/UL (ref 4.2–5.8)
SODIUM SERPL-SCNC: 137 MMOL/L (ref 135–146)
TIBC SERPL-MCNC: 337 MCG/DL (CALC) (ref 250–425)
TRIGL SERPL-MCNC: 89 MG/DL
TSH SERPL-ACNC: 1.15 MIU/L (ref 0.4–4.5)
VIT B1 BLD-SCNC: 137 NMOL/L (ref 78–185)
WBC # BLD AUTO: 6.6 THOUSAND/UL (ref 3.8–10.8)

## 2025-03-03 DIAGNOSIS — G62.89 OTHER POLYNEUROPATHY: ICD-10-CM

## 2025-03-03 RX ORDER — DULOXETIN HYDROCHLORIDE 30 MG/1
30 CAPSULE, DELAYED RELEASE ORAL DAILY
Qty: 30 CAPSULE | Refills: 6 | Status: SHIPPED | OUTPATIENT
Start: 2025-03-03 | End: 2026-03-03

## 2025-03-04 ENCOUNTER — TREATMENT (OUTPATIENT)
Dept: PHYSICAL THERAPY | Facility: CLINIC | Age: 75
End: 2025-03-04
Payer: MEDICARE

## 2025-03-04 DIAGNOSIS — M16.12 PRIMARY OSTEOARTHRITIS OF LEFT HIP: ICD-10-CM

## 2025-03-04 DIAGNOSIS — R29.898 WEAKNESS OF LEFT HIP: ICD-10-CM

## 2025-03-04 DIAGNOSIS — M25.552 LEFT HIP PAIN: Primary | ICD-10-CM

## 2025-03-04 PROCEDURE — 97140 MANUAL THERAPY 1/> REGIONS: CPT | Mod: GP

## 2025-03-04 PROCEDURE — 97110 THERAPEUTIC EXERCISES: CPT | Mod: GP

## 2025-03-04 NOTE — PROGRESS NOTES
Physical Therapy  Physical Therapy Treatment Note    Patient Name: Gaurav Fischer  MRN: [id number]  Today's Date: 3/4/2025  Time Calculation  Start Time: 1100  Stop Time: 1145  Time Calculation (min): 45 min  PT Therapeutic Procedures Time Entry  Manual Therapy Time Entry: 10  Therapeutic Exercise Time Entry: 30        Insurance:  Visit number: 2 of 8 (to 4/10/25)  Authorization info: Auth needed  Insurance Type: Payor: [employer] MEDICARE / Plan: [employer] MEDICARE ADVANTAGE / Product Type: *No Product type* /   Current Problem  1. Left hip pain  Follow Up In Physical Therapy      2. Weakness of left hip  Follow Up In Physical Therapy      3. Primary osteoarthritis of left hip  Follow Up In Physical Therapy        General  Reason for Referral: L hip pain  Referred By: Nabor Grimaldo,DO  Precautions  Precautions  Precautions Comment: None to PT  Subjective:     Patient reports that he feels his HEP has helped him a lot. He will get some minimal discomfort in his anterior and lateral hip but the pain is not debilitating   Pain     Objective:   Palpation/ Observation   Increased hip musculature tightness upon palpation      Hip ROM   Flexion- R: 110 L: 90  Abduction- R: 30 L: 30  ER- R: 30  L: 25  IR- R: 25 L: 20     Hip MMT  Flexion- R: 4+/5 L: 4/5  Abduction- R: 4+/5 L: 4+/5   Adduction- R: 4+/5 L: 4/5  Extension - R: 4/5 L: 4/5     Outcome Measures:  Other Measures  Lower Extremity Funtional Score (LEFS): 30/80  Treatments:     THERE EX  Supine lumbar rotation 1 x 20   Supine adduction 3 x 10   Supine clamshell 3 x 10 (green)   Pelvic tilt with stabilizer 2 x 10   Glute bridge 2 x 8    Standing palof press 2 x 10       MANUAL  Belted hip mobilizations       Assessment:  Pt tolerated session well. Pt was able to continue to progress HEP exercises today without any increase in low back or hip pain. Pt has a good understanding on what changes have been made. Pt continues to progress towards goals established in the POC and  should continue with skilled therapy.       Plan: Continue to progress hip ROM, increase core and LE strength      Goals:  Active       PT Problem       PT Goals       Start:  02/10/25       1. Pt will increase LEFS with 65/80 or better in order to demo an increase in L hip function  2. Pt will demo 4+/5 or all hip/knee MMT in order to demo an increase in LE strength   3. Pt will be able to ascend and descend 10 steps with reciprocal gait pattern and proper knee control in order to demo and increase in functional mobility   4. Pt will demo compliance and independence with HEP   5.  Pt will be able to walk for 20 mins with step through gait pattern and non antalgic gait pattern

## 2025-03-06 ENCOUNTER — APPOINTMENT (OUTPATIENT)
Dept: CARDIOLOGY | Facility: CLINIC | Age: 75
End: 2025-03-06
Payer: MEDICARE

## 2025-03-10 ENCOUNTER — TREATMENT (OUTPATIENT)
Dept: PHYSICAL THERAPY | Facility: CLINIC | Age: 75
End: 2025-03-10
Payer: MEDICARE

## 2025-03-10 DIAGNOSIS — M25.552 LEFT HIP PAIN: Primary | ICD-10-CM

## 2025-03-10 DIAGNOSIS — R29.898 WEAKNESS OF LEFT HIP: ICD-10-CM

## 2025-03-10 DIAGNOSIS — M16.12 PRIMARY OSTEOARTHRITIS OF LEFT HIP: ICD-10-CM

## 2025-03-10 PROCEDURE — 97110 THERAPEUTIC EXERCISES: CPT | Mod: GP

## 2025-03-10 PROCEDURE — 97140 MANUAL THERAPY 1/> REGIONS: CPT | Mod: GP

## 2025-03-10 NOTE — PROGRESS NOTES
Physical Therapy  Physical Therapy Treatment Note    Patient Name: Gaurav Fischer  MRN: [id number]  Today's Date: 3/10/2025  Time Calculation  Start Time: 1300  Stop Time: 1345  Time Calculation (min): 45 min  PT Therapeutic Procedures Time Entry  Manual Therapy Time Entry: 12  Therapeutic Exercise Time Entry: 28        Insurance:  Visit number: 3 of 8 (to 4/10/25)  Authorization info: Auth needed  Insurance Type: Payor: [employer] MEDICARE / Plan: [employer] MEDICARE ADVANTAGE / Product Type: *No Product type* /   Current Problem  1. Left hip pain  Follow Up In Physical Therapy      2. Weakness of left hip  Follow Up In Physical Therapy      3. Primary osteoarthritis of left hip  Follow Up In Physical Therapy        General  Reason for Referral: L hip pain  Referred By: Nabor Grimaldo,DO  Precautions  Precautions  Precautions Comment: None to PT  Subjective:     Patient has been having some hip discomfort with his banded clamshell exercise. Besides that he feels like everything has been going well.   Pain     Objective:   Palpation/ Observation   Increased hip musculature tightness upon palpation      Hip ROM   Flexion- R: 110 L: 90  Abduction- R: 30 L: 30  ER- R: 30  L: 25  IR- R: 25 L: 20     Hip MMT  Flexion- R: 4+/5 L: 4/5  Abduction- R: 4+/5 L: 4+/5   Adduction- R: 4+/5 L: 4/5  Extension - R: 4/5 L: 4/5     Outcome Measures:  Other Measures  Lower Extremity Funtional Score (LEFS): 30/80  Treatments:     THERE EX  Supine lumbar rotation 1 x 20   Supine nerve glide 2 x 10  Banded isometric clamshell (green) 3 x 10   Adduction bridge 3 x 6   BL shuttle press 3 x 10 (25,37#)     MANUAL  Belted hip mobilizations   Long axis distraction     Assessment:  Pt tolerated session well. Pt was able to continue to progress most of his HEP. Pt needed to regress to banded isometric for clamshell due to increased discomfort he was having. Pt continues to progress towards goals established in the POC and should continue with skilled  therapy.         Plan: Continue to progress hip ROM, increase core and LE strength      Goals:  Active       PT Problem       PT Goals       Start:  02/10/25       1. Pt will increase LEFS with 65/80 or better in order to demo an increase in L hip function  2. Pt will demo 4+/5 or all hip/knee MMT in order to demo an increase in LE strength   3. Pt will be able to ascend and descend 10 steps with reciprocal gait pattern and proper knee control in order to demo and increase in functional mobility   4. Pt will demo compliance and independence with HEP   5.  Pt will be able to walk for 20 mins with step through gait pattern and non antalgic gait pattern

## 2025-03-17 ENCOUNTER — APPOINTMENT (OUTPATIENT)
Dept: PHYSICAL THERAPY | Facility: CLINIC | Age: 75
End: 2025-03-17
Payer: MEDICARE

## 2025-03-17 DIAGNOSIS — R29.898 WEAKNESS OF LEFT HIP: ICD-10-CM

## 2025-03-17 DIAGNOSIS — M25.552 LEFT HIP PAIN: Primary | ICD-10-CM

## 2025-03-18 ENCOUNTER — TREATMENT (OUTPATIENT)
Dept: PHYSICAL THERAPY | Facility: CLINIC | Age: 75
End: 2025-03-18
Payer: MEDICARE

## 2025-03-18 DIAGNOSIS — M25.552 LEFT HIP PAIN: Primary | ICD-10-CM

## 2025-03-18 DIAGNOSIS — R29.898 WEAKNESS OF LEFT HIP: ICD-10-CM

## 2025-03-18 PROCEDURE — 97110 THERAPEUTIC EXERCISES: CPT | Mod: GP

## 2025-03-18 PROCEDURE — 97140 MANUAL THERAPY 1/> REGIONS: CPT | Mod: GP

## 2025-03-18 NOTE — PROGRESS NOTES
"Physical Therapy  Physical Therapy Treatment Note    Patient Name: Gaurav Fischer  MRN: [id number]  Today's Date: 3/18/2025  Time Calculation  Start Time: 1015  Stop Time: 1100  Time Calculation (min): 45 min  PT Therapeutic Procedures Time Entry  Manual Therapy Time Entry: 12  Therapeutic Exercise Time Entry: 28        Insurance:  Visit number: 3 of 8 (to 4/10/25)  Authorization info: Auth needed  Insurance Type: Payor: [employer] MEDICARE / Plan: [employer] MEDICARE ADVANTAGE / Product Type: *No Product type* /   Current Problem  1. Left hip pain        2. Weakness of left hip          General  Reason for Referral: L hip pain  Referred By: Nabor Grimaldo,DO  Precautions  Precautions  Precautions Comment: None to PT  Subjective:     Patient needed to modify his adduction bridge exercise due to increased discomfort. Besides that his exercises have been going well.    Pain     Objective:   Palpation/ Observation   Increased hip musculature tightness upon palpation      Hip ROM   Flexion- R: 110 L: 90  Abduction- R: 30 L: 30  ER- R: 30  L: 25  IR- R: 25 L: 20     Hip MMT  Flexion- R: 4+/5 L: 4/5  Abduction- R: 4+/5 L: 4+/5   Adduction- R: 4+/5 L: 4/5  Extension - R: 4/5 L: 4/5     Outcome Measures:  Other Measures  Lower Extremity Funtional Score (LEFS): 30/80  Treatments:     THERE EX  Banded isometric clamshell (blue) 2 x 10   Banded clamshell (blue) 2 x 10  Supine adduction 2 x 10   Supine bridge 2 x 6  Banded TKE (blue) 3 x 10   4\" step up 3 x 10   MANUAL  Belted hip mobilizations   Long axis distraction     Assessment:  Pt tolerated session well. Pt did much better with bridge and adduction exercises separately. He was able to begin to work on more quad strengthening today. Pt continues to progress towards goals established in the POC and should continue with skilled therapy.           Plan: Continue to progress hip ROM, increase core and LE strength      Goals:  Active       PT Problem       PT Goals       Start:  " 02/10/25       1. Pt will increase LEFS with 65/80 or better in order to demo an increase in L hip function  2. Pt will demo 4+/5 or all hip/knee MMT in order to demo an increase in LE strength   3. Pt will be able to ascend and descend 10 steps with reciprocal gait pattern and proper knee control in order to demo and increase in functional mobility   4. Pt will demo compliance and independence with HEP   5.  Pt will be able to walk for 20 mins with step through gait pattern and non antalgic gait pattern

## 2025-03-21 LAB — BODY SURFACE AREA: 2.11 M2

## 2025-03-21 PROCEDURE — 93248 EXT ECG>7D<15D REV&INTERPJ: CPT | Performed by: INTERNAL MEDICINE

## 2025-03-24 ENCOUNTER — TREATMENT (OUTPATIENT)
Dept: PHYSICAL THERAPY | Facility: CLINIC | Age: 75
End: 2025-03-24
Payer: MEDICARE

## 2025-03-24 DIAGNOSIS — R29.898 WEAKNESS OF LEFT HIP: ICD-10-CM

## 2025-03-24 DIAGNOSIS — M25.552 LEFT HIP PAIN: Primary | ICD-10-CM

## 2025-03-24 DIAGNOSIS — M16.12 PRIMARY OSTEOARTHRITIS OF LEFT HIP: ICD-10-CM

## 2025-03-24 PROCEDURE — 97140 MANUAL THERAPY 1/> REGIONS: CPT | Mod: GP

## 2025-03-24 PROCEDURE — 97110 THERAPEUTIC EXERCISES: CPT | Mod: GP

## 2025-03-24 NOTE — PROGRESS NOTES
Physical Therapy  Physical Therapy Treatment Note    Patient Name: Gaurav Fischer  MRN: [id number]  Today's Date: 3/24/2025  Time Calculation  Start Time: 1000  Stop Time: 1045  Time Calculation (min): 45 min  PT Therapeutic Procedures Time Entry  Manual Therapy Time Entry: 25  Therapeutic Exercise Time Entry: 15        Insurance:  Visit number: 4 of 8 (to 4/10/25)  Authorization info: Auth needed  Insurance Type: Payor: [employer]IC[employer] / Plan: [employer] MEDICARE ADVANTAGE / Product Type: *No Product type* /   Current Problem  1. Left hip pain  Follow Up In Physical Therapy      2. Weakness of left hip  Follow Up In Physical Therapy      3. Primary osteoarthritis of left hip  Follow Up In Physical Therapy        General  Reason for Referral: L hip pain  Referred By: Nabor Grimaldo,DO  Precautions  Precautions  Precautions Comment: None to PT  Subjective:     Patient continues to feel like he is making steady progress with his hip. He was having some increased numbness in his feet however   Pain     Objective:   Palpation/ Observation   Increased hip musculature tightness upon palpation      Hip ROM   Flexion- R: 110 L: 90  Abduction- R: 30 L: 30  ER- R: 30  L: 25  IR- R: 25 L: 20     Hip MMT  Flexion- R: 4+/5 L: 4/5  Abduction- R: 4+/5 L: 4+/5   Adduction- R: 4+/5 L: 4/5  Extension - R: 4/5 L: 4/5     Outcome Measures:  Other Measures  Lower Extremity Funtional Score (LEFS): 30/80  Treatments:     THERE EX  Banded isometric clamshell (black) 3 x 10    Supine adduction 2 x 10   Supine bridge 2 x 6  Prone on elbows 2 x 30 sec     MANUAL  Belted hip mobilizations   Long axis distraction   Theragun to lateral glute and TFL   PA mobs to lumbar spine     Assessment:  Pt tolerated session well. Pt had increased tightness in his TFL and lateral glute but did better after IASTM was performed. Pt was able to continue to work on lumbar flexibility without any increase in hip pain. Pt continues to progress towards goals established  in the POC and should continue with skilled therapy.             Plan: Continue to progress hip ROM, increase core and LE strength      Goals:  Active       PT Problem       PT Goals       Start:  02/10/25       1. Pt will increase LEFS with 65/80 or better in order to demo an increase in L hip function  2. Pt will demo 4+/5 or all hip/knee MMT in order to demo an increase in LE strength   3. Pt will be able to ascend and descend 10 steps with reciprocal gait pattern and proper knee control in order to demo and increase in functional mobility   4. Pt will demo compliance and independence with HEP   5.  Pt will be able to walk for 20 mins with step through gait pattern and non antalgic gait pattern

## 2025-03-30 NOTE — PROGRESS NOTES
Physical Therapy  Physical Therapy Treatment Note    Patient Name: Gaurav Fischer  MRN: [id number]  Today's Date: 3/31/2025  Time Calculation  Start Time: 1045  Stop Time: 1145  Time Calculation (min): 60 min  PT Therapeutic Procedures Time Entry  Manual Therapy Time Entry: 25  Therapeutic Exercise Time Entry: 25        Insurance:  Visit number: 5 of 8 (to 4/10/25)  Authorization info: Auth needed  Insurance Type: Payor: [employer] / Plan: [employer] MEDICARE ADVANTAGE / Product Type: *No Product type* /   Current Problem  1. Left hip pain  Follow Up In Physical Therapy      2. Weakness of left hip  Follow Up In Physical Therapy      3. Primary osteoarthritis of left hip  Follow Up In Physical Therapy        General     Precautions     Subjective:     Patient continues to feel like he is making steady progress with his hip. Does still have pain in front of hip and in glut at times.      Pain     Objective:   Palpation/ Observation   Increased hip musculature tightness upon palpation      Hip ROM   Flexion- R: 110 L: 90  Abduction- R: 30 L: 30  ER- R: 30  L: 25  IR- R: 25 L: 20     Hip MMT  Flexion- R: 4+/5 L: 4/5  Abduction- R: 4+/5 L: 4+/5   Adduction- R: 4+/5 L: 4/5  Extension - R: 4/5 L: 4/5     Outcome Measures:  Other Measures  Lower Extremity Funtional Score (LEFS): 30/80  Treatments:     THERE EX  Tendon loading YTB  legs at 90/90 3 x   Banded isometric clamshell (black) 3 x 10    Bridge with add x 10   NWB HF stretch with rollerstick   Standing HF stretch  Review bed mobility and use of leg   MANUAL  Belted hip mobilizations   Scoops  Theragun to lateral glute and TFL   DTM to glut, HF      Assessment:  Pt continues to progress towards goals established in the POC and should continue with skilled therapy.             Plan: Continue to progress hip ROM, increase core and LE strength      Goals:  Active       PT Problem       PT Goals       Start:  02/10/25       1. Pt will increase LEFS with 65/80 or  better in order to demo an increase in L hip function  2. Pt will demo 4+/5 or all hip/knee MMT in order to demo an increase in LE strength   3. Pt will be able to ascend and descend 10 steps with reciprocal gait pattern and proper knee control in order to demo and increase in functional mobility   4. Pt will demo compliance and independence with HEP   5.  Pt will be able to walk for 20 mins with step through gait pattern and non antalgic gait pattern

## 2025-03-31 ENCOUNTER — TREATMENT (OUTPATIENT)
Dept: PHYSICAL THERAPY | Facility: CLINIC | Age: 75
End: 2025-03-31
Payer: MEDICARE

## 2025-03-31 DIAGNOSIS — M25.552 LEFT HIP PAIN: Primary | ICD-10-CM

## 2025-03-31 DIAGNOSIS — M16.12 PRIMARY OSTEOARTHRITIS OF LEFT HIP: ICD-10-CM

## 2025-03-31 DIAGNOSIS — R29.898 WEAKNESS OF LEFT HIP: ICD-10-CM

## 2025-03-31 PROCEDURE — 97140 MANUAL THERAPY 1/> REGIONS: CPT | Mod: GP,CQ

## 2025-03-31 PROCEDURE — 97110 THERAPEUTIC EXERCISES: CPT | Mod: GP,CQ

## 2025-04-06 NOTE — PROGRESS NOTES
Physical Therapy  Physical Therapy Treatment Note    Patient Name: Gaurav Fischer  MRN: [id number]  Today's Date: 4/8/2025  Time Calculation  Start Time: 0100  Stop Time: 0145  Time Calculation (min): 45 min  PT Therapeutic Procedures Time Entry  Manual Therapy Time Entry: 15  Therapeutic Exercise Time Entry: 30        Insurance:  Visit number: 6 of 8 (to 4/10/25)  Authorization info: Auth needed  Insurance Type: Payor: [employer] / Plan: [employer] MEDICARE ADVANTAGE / Product Type: *No Product type* /   Current Problem  1. Left hip pain  Follow Up In Physical Therapy      2. Weakness of left hip  Follow Up In Physical Therapy      3. Primary osteoarthritis of left hip  Follow Up In Physical Therapy        General     Precautions     Subjective:     Patient continues to feel like he is making steady progress with his hip. Does still have pain in front of hip and in glut at times.      Pain     Objective:   Palpation/ Observation   Increased hip musculature tightness upon palpation      Hip ROM   Flexion- R: 110 L: 90  Abduction- R: 30 L: 30  ER- R: 30  L: 25  IR- R: 25 L: 20     Hip MMT  Flexion- R: 4+/5 L: 4/5  Abduction- R: 4+/5 L: 4+/5   Adduction- R: 4+/5 L: 4/5  Extension - R: 4/5 L: 4/5     Outcome Measures:  Other Measures  Lower Extremity Funtional Score (LEFS): 30/80  Treatments:     THERE EX  Tendon loading YTB  legs at 90/90 3 x   PPT Banded isometric clamshell (black) 3 x 10    Bridge with add x 10   NWB HF stretch with rollerstick   Standing HF stretch  Review bed mobility and use of leg   MANUAL  Belted hip mobilizations   Scoops  Theragun to lateral glute and TFL   DTM to glut, HF      Assessment:   Patient has a good understanding of HEP and will continue to strengthen with this.  He will contact Dr. Barber for evaluation regarding possible THR.           Plan: Discharge at this time with HEP     Goals:  Active       PT Problem       PT Goals       Start:  02/10/25       1. Pt will  increase LEFS with 65/80 or better in order to demo an increase in L hip function  2. Pt will demo 4+/5 or all hip/knee MMT in order to demo an increase in LE strength   3. Pt will be able to ascend and descend 10 steps with reciprocal gait pattern and proper knee control in order to demo and increase in functional mobility   4. Pt will demo compliance and independence with HEP   5.  Pt will be able to walk for 20 mins with step through gait pattern and non antalgic gait pattern

## 2025-04-07 ENCOUNTER — APPOINTMENT (OUTPATIENT)
Dept: CARDIOLOGY | Facility: CLINIC | Age: 75
End: 2025-04-07
Payer: MEDICARE

## 2025-04-07 ENCOUNTER — APPOINTMENT (OUTPATIENT)
Dept: PHYSICAL THERAPY | Facility: CLINIC | Age: 75
End: 2025-04-07
Payer: MEDICARE

## 2025-04-07 DIAGNOSIS — R29.898 WEAKNESS OF LEFT HIP: ICD-10-CM

## 2025-04-07 DIAGNOSIS — M25.552 LEFT HIP PAIN: Primary | ICD-10-CM

## 2025-04-08 ENCOUNTER — APPOINTMENT (OUTPATIENT)
Dept: CARDIOLOGY | Facility: CLINIC | Age: 75
End: 2025-04-08
Payer: MEDICARE

## 2025-04-08 ENCOUNTER — TREATMENT (OUTPATIENT)
Dept: PHYSICAL THERAPY | Facility: CLINIC | Age: 75
End: 2025-04-08
Payer: MEDICARE

## 2025-04-08 DIAGNOSIS — M25.552 LEFT HIP PAIN: Primary | ICD-10-CM

## 2025-04-08 DIAGNOSIS — M16.12 PRIMARY OSTEOARTHRITIS OF LEFT HIP: ICD-10-CM

## 2025-04-08 DIAGNOSIS — R29.898 WEAKNESS OF LEFT HIP: ICD-10-CM

## 2025-04-08 PROCEDURE — 97140 MANUAL THERAPY 1/> REGIONS: CPT | Mod: GP,CQ

## 2025-04-08 PROCEDURE — 97110 THERAPEUTIC EXERCISES: CPT | Mod: GP,CQ

## 2025-04-15 NOTE — PROGRESS NOTES
Jazzy Barber MD   Adult Reconstruction and Joint Replacement Surgery  Phone: [phone]     Fax: [phone]       Name: Gaurav Fischer  Age: 74 y.o.   : 1950   Date of Visit: 2025    INITIAL CONSULTATION    CC: Left hip pain    HPI:  This patient presents with 10 months of LEFT hip pain.     Patient has tried the following Activity modification, Tylenol (arthritis dosing) , Physical therapy, Corticosteroid injections , and Xray. Date of last steroid injection: 25. Patient does have pain at night. The patient does not report falls related to this problem.  Patient is able to walk {Blocks: 53300}. Patient is currently using nothing as assistive device. Primarily complains of groin, buttock, and lateral hip pain. Patient has difficulty with donning and doffing shoes and socks, stairs, and getting in/out of car . The pain is significantly impacting their ability to perform activities of daily living. Patient reports no longer able to do activities such as walk longer distances.     Focused History  PMH: Reviewed and PE/DVT: no. 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   PSH: Reviewed , Hip/Knee replacement: no, Hip/Knee surgery: no, Anesthesia complications: no, Spine surgery: lumbar discectomy, 25 years ago roughly, Surgical infection: no, and Weight loss surgery: no  SHx: Reviewed, Occupation: retired , Current smoker: no, EtOH intake weekly: 1 bottle of beer on weekend, Social support: sons but not driving age, and Preferred physical activities: pool [email or web address]  Jehovah´s Witness: no  Meds: Reviewed, Current Anticoagulants: no, Weight loss medication: no, and Current Opioids: no  Allergies: Reviewed  and The patient reports no contraindications or allergies to cephalosporins, aspirin, NSAIDs or opioids, except as noted above.  Dental Hx: Last routine cleaning: had tooth surgery 2 months ago, no active issues and All invasive dental  work must be completed 3+ months prior to joint replacement surgery. Dental cleaning must be completed 6+ weeks prior to joint replacement surgery. Patient are to avoid any invasive dental work 3-6 months post-surgically.   FH: No family history of any bleeding or clotting disorders.    HISTORY/PROMs  PROMs   No questionnaires on file.     Past Medical History:   Diagnosis Date    Dependence on other enabling machines and devices     History of continuous positive airway pressure (CPAP) therapy at home    Encounter for general adult medical examination without abnormal findings 07/12/2019    Welcome to Medicare preventive visit    Hip pain, acute, right     Low back pain     Personal history of other endocrine, nutritional and metabolic disease 03/03/2015    History of type 2 multiple endocrine neoplasia (MEN)       Past Medical History:   Diagnosis Date    Dependence on other enabling machines and devices     History of continuous positive airway pressure (CPAP) therapy at home    Encounter for general adult medical examination without abnormal findings 07/12/2019    Welcome to Medicare preventive visit    Hip pain, acute, right     Low back pain     Personal history of other endocrine, nutritional and metabolic disease 03/03/2015    History of type 2 multiple endocrine neoplasia (MEN)     Documented in chart and reviewed.     Past Surgical History:   Procedure Laterality Date    APPENDECTOMY  03/03/2015    Appendectomy    COLONOSCOPY  03/03/2015    Complete Colonoscopy    HERNIA REPAIR  03/03/2015    Hernia Repair    OTHER SURGICAL HISTORY  02/17/2017    Laminectomy Decompressive Up To Two Lumbar Segments    OTHER SURGICAL HISTORY  04/08/2021    Back surgery    SINUS SURGERY  03/03/2015    Sinus Surgery    TONSILLECTOMY  03/03/2015    Tonsillectomy    TOTAL THYROIDECTOMY  01/05/2022    Thyroid Surgery Total Thyroidectomy       Allergies: He is allergic to sulfamethoxazole-trimethoprim, other, griseofulvin, and  pollen extracts.     Medications:  Current Outpatient Medications   Medication Instructions    amLODIPine (NORVASC) 5 mg, oral, Daily    cetirizine (ZYRTEC) 10 mg, As needed    desoximetasone (Topicort) 0.25 % cream Desoximetasone 0.25 % External Cream APPLY AND GENTLY MASSAGE INTO AFFECTED AREA(S) TWICE DAILY. as needed Refills: 0 Start : 26-Oct-2016 Active    DULoxetine (CYMBALTA) 30 mg, oral, Daily, Do not crush or chew. One every evening    famotidine (PEPCID) 20 mg, Daily    ferrous sulfate (Slow Fe) 137 mg (45 mg iron) tablet extended release 1 tablet, Daily    hydrocortisone 1 % ointment As needed    levothyroxine (SYNTHROID, LEVOXYL) 112 mcg, oral, Every morning, Take on empty stomach    lovastatin (MEVACOR) 40 mg, oral, Nightly    MELATONIN ORAL 1 mg    meloxicam (MOBIC) 15 mg, oral, Daily, To take with food and 1 glass of water    neomycin-polymyxin-pramoxine (Neosporin) 3.5-10,000-10 mg-unit-mg/gram cream Apply topically.    tadalafil (CIALIS) 10 mg, oral, As needed    triamcinolone (Nasacort) 55 mcg nasal inhaler Administer into affected nostril(s).       Family History   Problem Relation Name Age of Onset    Breast cancer Mother      Other (mesothelioma) Father      Breast cancer Sister      Diabetes type II Sister       Documented in chart and reviewed.     Social History     Tobacco Use    Smoking status: Former     Types: Cigars    Smokeless tobacco: Never   Substance Use Topics    Alcohol use: Yes     Alcohol/week: 3.0 standard drinks of alcohol     Types: 3 Cans of beer per week     Comment: 1/2 BEER OR GLASS OF WINE NIGHTLY/rarely anymore        Review of Systems: Review of systems completed with medical assistant intake. Please refer to this note.     Physical Exam:  BMI: 29.    General: The patient is well appearing and has an appropriate affect.     Neurological Examination: SILT in SPN/DPN/Sural/Saphenous/Tibial nerves. 5/5 EHL, FHL, Tibial anterior, Gastrocnemius. Coordination grossly intact.      Cardiovascular Exam: Capillary refill <2 seconds.     Lymphatic Examination: There is no obvious lymphatic swelling present around the involved joint.    Skin Exam: Skin around the pertinent joint is without evidence of infection or rash.    Gait: The patient ambulates with a ***coxalgic gait.     Lumbar spine:    No tenderness to palpation midline.    ***Negative straight leg raise bilaterally.    Left Hip Examination:    ***Examination of the hip reveals the skin to be intact.    There is mild*** tenderness over the greater trochanter.    There is no obvious swelling.    There is a {POSITIVE/NEGATIVE:81582} Stinchfield test.    Range of motion is: full extension*** to *** degrees of flexion.    The hip internally rotates to *** degrees and externally rotates to *** degrees.    Abduction is *** degrees and adduction is *** degrees.    There is groin and buttock pain with hip motion.    Abductor strength ***4/5.    Right Hip Examination:    ***Examination of the hip reveals the skin to be intact.    There is no tenderness over the greater trochanter.    There is no obvious swelling.    There is a {POSITIVE/NEGATIVE:03034} Stinchfield test.    Range of motion is full extension to *** degrees of flexion.    The hip internally rotates to 20 and externally rotates 40 degrees.    Abduction is 50 degrees and adduction is 20 degrees.    There is no groin and buttock pain with hip motion.    Abductor strength ***4/5.    Right Knee Examination:  ***Examination of the right knee reveals the skin to be intact. There is no obvious swelling.    There is no tenderness to palpation.    Range of motion is full extension to 120 degrees of flexion.    The knee is stable.    There is no grinding with range of motion.    There is no patellofemoral crepitus.    Left Knee Examination:  ***Examination of the left knee reveals the skin to be intact. There is no obvious swelling.    There is no tenderness to palpation.    Range of  "motion is full extension to 120 degrees of flexion.    The knee is stable.    There is no grinding with range of motion.    There is no patellofemoral crepitus.    Prior Labs:   Prior Labs:   Lab Results   Component Value Date    WBC 6.6 02/24/2025    HGB 14.7 02/24/2025    HCT 43.4 02/24/2025    MCV 87.3 02/24/2025     02/24/2025      Lab Results   Component Value Date    INR 1.0 08/14/2024    PROTIME 10.9 08/14/2024         Lab Results   Component Value Date    GLUCOSE 83 02/24/2025    CALCIUM 9.2 02/24/2025     02/24/2025    K 4.4 02/24/2025    CO2 27 02/24/2025     02/24/2025    BUN 17 02/24/2025    CREATININE 1.00 02/24/2025      No results found for: \"CKTOTAL\", \"CKMB\", \"CKMBINDEX\", \"TROPONINI\"   Lab Results   Component Value Date    HGBA1C 5.3 12/20/2024         No results found for: \"CRP\"   No results found for: \"SEDRATE\"      Radiographs:  Radiographs were personally reviewed today with the patient. There is evidence of severe LEFT hip osteoarthritis with bone on bone apposition.    Impression:  This patient presents with severe LEFT hip osteoarthritis with bone on bone apposition. This is ***now affecting activities of daily living. ***All appropriate nonsurgical management options have been tried and failed. ***The patient is not a candidate for surgery at this time.    Diagnosis:   Primary osteoarthritis of left hip    Left hip pain     Recommendations / Plan:    I have discussed the options in detail with the patient. We have discussed anti-inflammatory medication, activity modification, physical therapy, corticosteroid injections, and total hip replacement surgery. ***The patient has not yet exhausted all conservative treatment measures.     The risks and benefits of all these treatment options have been discussed in detail. The patient has tried at least 3 months of the above conservative treatments and continues to have disabling pain, impaired activities of daily living and " worsened quality of life.*** The patient is a candidate for {LEFT/RIGHT:03063} JULISA total hip replacement. We discussed anterior and posterolateral surgical approaches to the hip joint with my rationale for *** approach. Risks and benefits of all approaches were reviewed. We discussed expected rehabilitation, DVT prophylaxis, time points during recovery, likely functional outcomes and long-term issues with hip replacement procedures.    We have reviewed the typical recovery after surgery and have discussed the fact that full recovery can take up to 1 year. ***For anterior approach surgery, I specifically discussed the increased risk for intra-operative fracture, the risk of aseptic femoral failure, and the risk for lateral femoral cutaneous nerve injury.    ***The patient has pre-existing hardware around the joint. Discussed that some or all of these implants may need to be removed to accommodate a joint replacement.    The patient does not have any of the following contraindications to arthroplasty including active infection of the hip joint, systemic bacteremia, active skin infection or an open wound at the surgical site, neuropathic arthritis or severe, rapidly progressive neurological disease.     Patient will consider proceeding with {LEFT/RIGHT:39945} JULISA. All questions were answered today. If the patient chooses to move forward with surgical scheduling, they will be enrolled in a preoperative arthroplasty teaching class and the pre-admission testing pathway. The patient was encouraged to contact their primary care physician*** to discuss fitness for surgery.     Social support after surgery: {Social:94841}  Pain medication plan: {Pain Plan:28642}  Additional preoperative considerations: {Preop:42451}    Diagnosis: {Surg DX:90916}   Procedure: {Surg Procedure:50296}  Surgery Location: {Surg Location:90911}   Equipment Requests: {Equipment Requests Primary:97678}  Anesthesia: {Anes:17537}  Discharge:  {DISCHARGE:67191}    Cards***     ***The risks and benefits of all these treatment options have been discussed in detail.     ***The patient has tried at least 3 months of the above conservative treatments and continues to have disabling pain, impaired activities of daily living and worsened quality of life.  Reviewed the surgical optimization steps to optimize their chances for a successful joint replacement surgery.      ***Currently their BMI is ***.  Discussed that obesity is a risk factor for continued progression of osteoarthritis. Each pound of weight loss offloads their hip and knee joints by 3-6 pounds.  The most effective of these options is weight loss mainly through restricting caloric intake. ***They would need to lose significant weight before being scheduled for surgery. A referral to a nutritionist was offered***. A referral to bariatric surgery was offered***.     ***A physical therapy prescription was ordered for the patient.  ***Patient will continue their home exercise program. ***Strategies for pain management using over-the-counter anti-inflammatory medications reviewed.  ***The patient was prescribed ***for pain management. ***Discussed the potential benefit of a steroid injection and the patient was referred to my nonsurgical sports partners for consideration. Encouraged them to maintain range of motion and strength around the knee joints.  They will continue to implement these strategies in addressing their pain.      ***Patient was advised to seek further refills for prescription anti-inflammatory medications (e.g. Celebrex, Meloxicam) from their primary care physician as careful monitoring of kidney function, heart function, blood pressure, and other health considerations is important while on these medications.      ***Recommend the patient continue optimizing nonsurgical treatment interventions as outlined above for management of their arthritis.  I would be happy to see them again to  discuss surgery if indicated or they are more optimized or to review progress with nonsurgical treatment of arthritis.  The patient verbalizes understanding with the recommendations and treatment plan as outlined above and is in agreement.  Questions were addressed.    _____________  Jazzy Barber MD   Attending Orthopaedic Surgeon  [facility]    [facility]     This note was transcribed with dictation software.  Please excuse any typographical errors, program misunderstandings leading to inadvertent insertions or deletions of inappropriate wording, pronoun errors and other unintentional transcription errors not noticed on proof-reading.

## 2025-04-18 ENCOUNTER — APPOINTMENT (OUTPATIENT)
Dept: RADIOLOGY | Facility: HOSPITAL | Age: 75
End: 2025-04-18
Payer: MEDICARE

## 2025-04-18 ENCOUNTER — OFFICE VISIT (OUTPATIENT)
Dept: ORTHOPEDIC SURGERY | Facility: HOSPITAL | Age: 75
End: 2025-04-18
Payer: MEDICARE

## 2025-04-18 ENCOUNTER — APPOINTMENT (OUTPATIENT)
Dept: ORTHOPEDIC SURGERY | Facility: HOSPITAL | Age: 75
End: 2025-04-18
Payer: MEDICARE

## 2025-04-18 DIAGNOSIS — M16.12 PRIMARY OSTEOARTHRITIS OF LEFT HIP: Primary | ICD-10-CM

## 2025-04-18 DIAGNOSIS — M25.552 LEFT HIP PAIN: ICD-10-CM

## 2025-04-18 PROCEDURE — 1123F ACP DISCUSS/DSCN MKR DOCD: CPT | Performed by: STUDENT IN AN ORGANIZED HEALTH CARE EDUCATION/TRAINING PROGRAM

## 2025-04-18 PROCEDURE — 99215 OFFICE O/P EST HI 40 MIN: CPT | Performed by: STUDENT IN AN ORGANIZED HEALTH CARE EDUCATION/TRAINING PROGRAM

## 2025-04-18 PROCEDURE — 99215 OFFICE O/P EST HI 40 MIN: CPT | Mod: 57 | Performed by: STUDENT IN AN ORGANIZED HEALTH CARE EDUCATION/TRAINING PROGRAM

## 2025-04-18 NOTE — LETTER
2025     Renetta Barnett MD  [address]  [address]  [facility] [address]    Patient: Sidney Fischer   YOB: 1950   Date of Visit: 2025       Dear Dr. Renetta Barnett MD:    Thank you for referring Sidney Fischer to me for evaluation. Below are my notes for this consultation.  If you have questions, please do not hesitate to call me. I look forward to following your patient along with you.       Sincerely,     Jazzy Barber MD      CC: No Recipients  ______________________________________________________________________________________        Jazzy Barber MD   Adult Reconstruction and Joint Replacement Surgery  Phone: [phone]     Fax: [phone]       Name: Gaurav Fischer  Age: 74 y.o.   : 1950   Date of Visit: 2025    INITIAL CONSULTATION    CC: Left hip pain    HPI:  This patient presents with 10 months of LEFT hip pain.     Patient has tried the following Activity modification, Tylenol (arthritis dosing) , Physical therapy, Corticosteroid injections , and Xray. Date of last steroid injection: 25. Patient does have pain at night. The patient does not report falls related to this problem.  Patient is able to walk 2-3 blocks. Patient is currently using nothing as assistive device. Primarily complains of groin, buttock, and lateral hip pain. Patient has difficulty with donning and doffing shoes and socks, stairs, and getting in/out of car . The pain is significantly impacting their ability to perform activities of daily living. Patient reports no longer able to do activities such as walk longer distances.     Focused History  PMH: Reviewed and PE/DVT: no.  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   PSH: Reviewed , Hip/Knee replacement: no, Hip/Knee surgery: no, Anesthesia complications: no, Spine surgery: lumbar discectomy, 25 years ago roughly, Surgical infection: no, and Weight loss surgery:  no  SHx: Reviewed, Occupation: retired , Current smoker: no, EtOH intake weekly: 1 bottle of beer on weekend, Social support: sons but not driving age, and Preferred physical activities: pool tournaments  Jehovah´s Witness: no  Meds: Reviewed, Current Anticoagulants: no, Weight loss medication: no, and Current Opioids: no  Allergies: Reviewed  and The patient reports no contraindications or allergies to cephalosporins, aspirin, NSAIDs or opioids, except as noted above.  Dental Hx: Last routine cleaning: had tooth surgery 2 months ago, no active issues and All invasive dental work must be completed 3+ months prior to joint replacement surgery. Dental cleaning must be completed 6+ weeks prior to joint replacement surgery. Patient are to avoid any invasive dental work 3-6 months post-surgically.   FH: No family history of any bleeding or clotting disorders.    HISTORY/PROMs  PROMs   No questionnaires on file.     Past Medical History:   Diagnosis Date   • Dependence on other enabling machines and devices     History of continuous positive airway pressure (CPAP) therapy at home   • Encounter for general adult medical examination without abnormal findings 07/12/2019    Welcome to Medicare preventive visit   • Hip pain, acute, right    • Low back pain    • Personal history of other endocrine, nutritional and metabolic disease 03/03/2015    History of type 2 multiple endocrine neoplasia (MEN)       Past Medical History:   Diagnosis Date   • Dependence on other enabling machines and devices     History of continuous positive airway pressure (CPAP) therapy at home   • Encounter for general adult medical examination without abnormal findings 07/12/2019    Welcome to Medicare preventive visit   • Hip pain, acute, right    • Low back pain    • Personal history of other endocrine, nutritional and metabolic disease 03/03/2015    History of type 2 multiple endocrine neoplasia (MEN)     Documented in chart and reviewed.      Past Surgical History:   Procedure Laterality Date   • APPENDECTOMY  03/03/2015    Appendectomy   • COLONOSCOPY  03/03/2015    Complete Colonoscopy   • HERNIA REPAIR  03/03/2015    Hernia Repair   • OTHER SURGICAL HISTORY  02/17/2017    Laminectomy Decompressive Up To Two Lumbar Segments   • OTHER SURGICAL HISTORY  04/08/2021    Back surgery   • SINUS SURGERY  03/03/2015    Sinus Surgery   • TONSILLECTOMY  03/03/2015    Tonsillectomy   • TOTAL THYROIDECTOMY  01/05/2022    Thyroid Surgery Total Thyroidectomy       Allergies: He is allergic to sulfamethoxazole-trimethoprim, other, griseofulvin, and pollen extracts.     Medications:  Current Outpatient Medications   Medication Instructions   • amLODIPine (NORVASC) 5 mg, oral, Daily   • cetirizine (ZYRTEC) 10 mg, As needed   • desoximetasone (Topicort) 0.25 % cream Desoximetasone 0.25 % External Cream APPLY AND GENTLY MASSAGE INTO AFFECTED AREA(S) TWICE DAILY. as needed Refills: 0 Start : 26-Oct-2016 Active   • DULoxetine (CYMBALTA) 30 mg, oral, Daily, Do not crush or chew. One every evening   • famotidine (PEPCID) 20 mg, Daily   • ferrous sulfate (Slow Fe) 137 mg (45 mg iron) tablet extended release 1 tablet, Daily   • hydrocortisone 1 % ointment As needed   • levothyroxine (SYNTHROID, LEVOXYL) 112 mcg, oral, Every morning, Take on empty stomach   • lovastatin (MEVACOR) 40 mg, oral, Nightly   • MELATONIN ORAL 1 mg   • meloxicam (MOBIC) 15 mg, oral, Daily, To take with food and 1 glass of water   • neomycin-polymyxin-pramoxine (Neosporin) 3.5-10,000-10 mg-unit-mg/gram cream Apply topically.   • tadalafil (CIALIS) 10 mg, oral, As needed   • triamcinolone (Nasacort) 55 mcg nasal inhaler Administer into affected nostril(s).       Family History   Problem Relation Name Age of Onset   • Breast cancer Mother     • Other (mesothelioma) Father     • Breast cancer Sister     • Diabetes type II Sister       Documented in chart and reviewed.     Social History     Tobacco Use    • Smoking status: Former     Types: Cigars   • Smokeless tobacco: Never   Substance Use Topics   • Alcohol use: Yes     Alcohol/week: 3.0 standard drinks of alcohol     Types: 3 Cans of beer per week     Comment: 1/2 BEER OR GLASS OF WINE NIGHTLY/rarely anymore        Review of Systems: Review of systems completed with medical assistant intake. Please refer to this note.     Physical Exam:  BMI: 29.    General: The patient is well appearing and has an appropriate affect.     Neurological Examination: SILT in SPN/DPN/Sural/Saphenous/Tibial nerves. 5/5 EHL, FHL, Tibial anterior, Gastrocnemius. Coordination grossly intact.     Cardiovascular Exam: Capillary refill <2 seconds.     Lymphatic Examination: There is no obvious lymphatic swelling present around the involved joint.    Skin Exam: Skin around the pertinent joint is without evidence of infection or rash.    Gait: The patient ambulates with a coxalgic gait.     Lumbar spine:    No tenderness to palpation midline.    Negative straight leg raise bilaterally.    Left Hip Examination:    Examination of the hip reveals the skin to be intact.    There is mild tenderness over the greater trochanter.    There is no obvious swelling.    There is a positive Stinchfield test.    Range of motion is: full extension to 95 degrees of flexion.    The hip internally rotates to 10 degrees and externally rotates to 35 degrees.    Abduction is 35 degrees and adduction is 10 degrees.    There is groin and buttock pain with hip motion.    Abductor strength 4/5.    Right Hip Examination:    Examination of the hip reveals the skin to be intact.    There is no tenderness over the greater trochanter.    There is no obvious swelling.    There is a negative Stinchfield test.    Range of motion is full extension to 100 degrees of flexion.    The hip internally rotates to 20 and externally rotates 40 degrees.    Abduction is 50 degrees and adduction is 20 degrees.    There is no groin and  "buttock pain with hip motion.    Abductor strength 4/5.    Right Knee Examination:  Examination of the right knee reveals the skin to be intact. There is no obvious swelling.    There is no tenderness to palpation.    Range of motion is full extension to 120 degrees of flexion.    The knee is stable.    There is no grinding with range of motion.    There is no patellofemoral crepitus.    Left Knee Examination:  Examination of the left knee reveals the skin to be intact. There is no obvious swelling.    There is no tenderness to palpation.    Range of motion is full extension to 120 degrees of flexion.    The knee is stable.    There is no grinding with range of motion.    There is no patellofemoral crepitus.    Prior Labs:   Prior Labs:   Lab Results   Component Value Date    WBC 6.6 02/24/2025    HGB 14.7 02/24/2025    HCT 43.4 02/24/2025    MCV 87.3 02/24/2025     02/24/2025      Lab Results   Component Value Date    INR 1.0 08/14/2024    PROTIME 10.9 08/14/2024         Lab Results   Component Value Date    GLUCOSE 83 02/24/2025    CALCIUM 9.2 02/24/2025     02/24/2025    K 4.4 02/24/2025    CO2 27 02/24/2025     02/24/2025    BUN 17 02/24/2025    CREATININE 1.00 02/24/2025      No results found for: \"CKTOTAL\", \"CKMB\", \"CKMBINDEX\", \"TROPONINI\"   Lab Results   Component Value Date    HGBA1C 5.3 12/20/2024         No results found for: \"CRP\"   No results found for: \"SEDRATE\"      Radiographs:  Radiographs were personally reviewed today with the patient. There is evidence of severe LEFT hip osteoarthritis with bone on bone apposition.    Impression:  This patient presents with severe LEFT hip osteoarthritis with bone on bone apposition. This is now affecting activities of daily living. All appropriate nonsurgical management options have been tried and failed.     Diagnosis:   Primary osteoarthritis of left hip    Left hip pain     Recommendations / Plan:    I have discussed the options in detail " with the patient. We have discussed anti-inflammatory medication, activity modification, physical therapy, corticosteroid injections, and total hip replacement surgery.     The risks and benefits of all these treatment options have been discussed in detail. The patient has tried at least 3 months of the above conservative treatments and continues to have disabling pain, impaired activities of daily living and worsened quality of life. The patient is a candidate for LEFT JULISA total hip replacement. We discussed anterior and posterolateral surgical approaches to the hip joint with my rationale for anterior approach. Risks and benefits of all approaches were reviewed. We discussed expected rehabilitation, DVT prophylaxis, time points during recovery, likely functional outcomes and long-term issues with hip replacement procedures.    We have reviewed the typical recovery after surgery and have discussed the fact that full recovery can take up to 1 year. For anterior approach surgery, I specifically discussed the increased risk for intra-operative fracture, the risk of aseptic femoral failure, and the risk for lateral femoral cutaneous nerve injury.    The patient does not have any of the following contraindications to arthroplasty including active infection of the hip joint, systemic bacteremia, active skin infection or an open wound at the surgical site, neuropathic arthritis or severe, rapidly progressive neurological disease.     Patient will consider proceeding with LEFT JULISA. All questions were answered today. If the patient chooses to move forward with surgical scheduling, they will be enrolled in a preoperative arthroplasty teaching class and the pre-admission testing pathway. The patient was encouraged to contact their primary care physician to discuss fitness for surgery.     Social support after surgery: TBD   Pain medication plan: Standard (Acetominophen, Meloxicam, Oxycodone, Tramadol)   Additional  preoperative considerations:   [ ] Cardiology clearance -seen in February 2025, Dr. Benavides, needs surgical clearance    Diagnosis: M16.12 - LEFT hip osteoarthritis   Procedure: 36190 - Total HIP Arthroplasty (JULISA) - ANTERIOR  Surgery Location: [facility]   Equipment Requests: JULISA - ANTERIOR: [place] table accessories, [employer] [place] Finger Control with AS14 attachment, Cylindrical silvia ([id number]) and DEPUY: Emphasys, Actis  Anesthesia: JULISA: Regional - Spinal   Discharge: Overnight     _____________  Jazzy Barber MD   Attending Orthopaedic Surgeon  [facility]    [facility]     This note was transcribed with dictation software.  Please excuse any typographical errors, program misunderstandings leading to inadvertent insertions or deletions of inappropriate wording, pronoun errors and other unintentional transcription errors not noticed on proof-reading.

## 2025-05-02 ENCOUNTER — APPOINTMENT (OUTPATIENT)
Dept: ORTHOPEDIC SURGERY | Facility: HOSPITAL | Age: 75
End: 2025-05-02
Payer: MEDICARE

## 2025-05-06 LAB
ATRIAL RATE: 79 BPM
P AXIS: 86 DEGREES
P OFFSET: 163 MS
P ONSET: 118 MS
PR INTERVAL: 184 MS
Q ONSET: 210 MS
QRS COUNT: 13 BEATS
QRS DURATION: 96 MS
QT INTERVAL: 392 MS
QTC CALCULATION(BAZETT): 449 MS
QTC FREDERICIA: 429 MS
R AXIS: 53 DEGREES
T AXIS: 79 DEGREES
T OFFSET: 406 MS
VENTRICULAR RATE: 79 BPM

## 2025-05-07 ENCOUNTER — APPOINTMENT (OUTPATIENT)
Dept: NEUROLOGY | Facility: CLINIC | Age: 75
End: 2025-05-07
Payer: MEDICARE

## 2025-05-07 VITALS
DIASTOLIC BLOOD PRESSURE: 90 MMHG | SYSTOLIC BLOOD PRESSURE: 136 MMHG | TEMPERATURE: 96.9 F | HEART RATE: 72 BPM | WEIGHT: 212 LBS | BODY MASS INDEX: 30.35 KG/M2 | HEIGHT: 70 IN

## 2025-05-07 DIAGNOSIS — M54.50 CHRONIC LOW BACK PAIN, UNSPECIFIED BACK PAIN LATERALITY, UNSPECIFIED WHETHER SCIATICA PRESENT: ICD-10-CM

## 2025-05-07 DIAGNOSIS — G62.89 OTHER POLYNEUROPATHY: ICD-10-CM

## 2025-05-07 DIAGNOSIS — R20.0 LOWER EXTREMITY NUMBNESS: Primary | ICD-10-CM

## 2025-05-07 DIAGNOSIS — G89.29 CHRONIC LOW BACK PAIN, UNSPECIFIED BACK PAIN LATERALITY, UNSPECIFIED WHETHER SCIATICA PRESENT: ICD-10-CM

## 2025-05-07 PROCEDURE — 99204 OFFICE O/P NEW MOD 45 MIN: CPT | Performed by: PSYCHIATRY & NEUROLOGY

## 2025-05-07 PROCEDURE — G2211 COMPLEX E/M VISIT ADD ON: HCPCS | Performed by: PSYCHIATRY & NEUROLOGY

## 2025-05-07 PROCEDURE — 1036F TOBACCO NON-USER: CPT | Performed by: PSYCHIATRY & NEUROLOGY

## 2025-05-07 PROCEDURE — 3075F SYST BP GE 130 - 139MM HG: CPT | Performed by: PSYCHIATRY & NEUROLOGY

## 2025-05-07 PROCEDURE — 1159F MED LIST DOCD IN RCRD: CPT | Performed by: PSYCHIATRY & NEUROLOGY

## 2025-05-07 PROCEDURE — 3080F DIAST BP >= 90 MM HG: CPT | Performed by: PSYCHIATRY & NEUROLOGY

## 2025-05-07 PROCEDURE — 3008F BODY MASS INDEX DOCD: CPT | Performed by: PSYCHIATRY & NEUROLOGY

## 2025-05-07 RX ORDER — AZELASTINE 1 MG/ML
SPRAY, METERED NASAL
COMMUNITY

## 2025-05-07 ASSESSMENT — PATIENT HEALTH QUESTIONNAIRE - PHQ9
SUM OF ALL RESPONSES TO PHQ9 QUESTIONS 1 & 2: 0
1. LITTLE INTEREST OR PLEASURE IN DOING THINGS: NOT AT ALL
2. FEELING DOWN, DEPRESSED OR HOPELESS: NOT AT ALL

## 2025-05-07 ASSESSMENT — LIFESTYLE VARIABLES
AUDIT-C TOTAL SCORE: 4
HOW OFTEN DO YOU HAVE SIX OR MORE DRINKS ON ONE OCCASION: NEVER
HOW MANY STANDARD DRINKS CONTAINING ALCOHOL DO YOU HAVE ON A TYPICAL DAY: 1 OR 2
SKIP TO QUESTIONS 9-10: 1
HOW OFTEN DO YOU HAVE A DRINK CONTAINING ALCOHOL: 4 OR MORE TIMES A WEEK

## 2025-05-07 NOTE — PROGRESS NOTES
"Consulting Physician: Kaleigh Toribio    Chief Complaint: Neuropathy Evaluation    History Of Present Illness  Gaurav Fischer \"Sidney\" is a 74 y.o. male presenting with above.    In September 2024, he developed numbness in his toes.  He notes it when he wakes up in the morning.  He notices that the longer he lies down, the more the numbness travels.  In fact, the numbness can travel up to his knees.  When he wakes up, the numbness does improve (however, at times, it can stay with him throughout the day).  He does note some weakness in his legs.  He notes that it can be difficult to get up from a toilet or get up from a chair.  He does have chronic low back pain and left hip pain.  He denies any weakness/numbness in his arms.  He does note occasional balance difficulty when the numbness is bad.       Past Medical History  Medical History[1]    Surgical History  Surgical History[2]    Family History  Family History[3]     Social History   reports that he quit smoking about 53 years ago. His smoking use included cigars and cigarettes. He started smoking about 53 years ago. He has a 0.1 pack-year smoking history. He has never used smokeless tobacco. He reports current alcohol use of about 2.0 standard drinks of alcohol per week. He reports that he does not use drugs.     Allergies  Sulfamethoxazole-trimethoprim, Other, Griseofulvin, and Pollen extracts    Medications  Current Medications[4]      Last Recorded Vitals   There were no vitals taken for this visit.    Objective:  Gen: NAD  Neuro:  --HIF: A&O X 3, repetition and naming intact  --CN:  PERRLA, EOMI, VFF, no visible facial asymmetry, facial sensation intact, no tongue or palatal deviation, SCM intact  --Motor: Moves all 4 extremities equally; no focal deficits  --Sensory: Intact to light touch, intact to pinprick  --Reflex: 3+ in UE, 2+ patella, 2+ achilles, toes down, de leon's negative  --Cerebellum: FTN and HTS intact  --Gait: Normal, narrow based.  Toe and " "Heal Walking Intact.  Tandem Intact    Relevant Results  Lab Results   Component Value Date    WBC 6.6 02/24/2025    HGB 14.7 02/24/2025    HCT 43.4 02/24/2025    MCV 87.3 02/24/2025     02/24/2025       Lab Results   Component Value Date    GLUCOSE 83 02/24/2025    CALCIUM 9.2 02/24/2025     02/24/2025    K 4.4 02/24/2025    CO2 27 02/24/2025     02/24/2025    BUN 17 02/24/2025    CREATININE 1.00 02/24/2025       Lab Results   Component Value Date    HGBA1C 5.3 12/20/2024       Lab Results   Component Value Date    CHOL 129 02/24/2025    CHOL 156 01/19/2024    CHOL 132 07/14/2023     Lab Results   Component Value Date    HDL 43 02/24/2025    HDL 45.6 01/19/2024    HDL 48.5 07/14/2023     Lab Results   Component Value Date    LDLCALC 69 02/24/2025    LDLCALC 94 01/19/2024     Lab Results   Component Value Date    TRIG 89 02/24/2025    TRIG 82 01/19/2024    TRIG 71 07/14/2023     No components found for: \"CHOLHDL\"    [facility] records:  Vitamin E: 6.4  Vitamin B6:  64.6  Vitamin B1: 170.3  MMA: 0.21  Kappa Free: 23  K/L Ratio: 1.65  Copper: 93  Vitamin B12: 437    MRI C Spine:  Cervical spondylosis with moderate canal stenosis at C5-C6.     Variable degrees of mild to moderate foraminal stenosis as detailed.     No pathologic enhancement.     EMG/NCV (I personally reviewed the images/tracings with the following interpretation)  No evidence of polyneuropathy  Possible L5 radiculopathy     Assessment:  This is a 74 year old male presenting for evaluation of neuropathy.  The patient has noted numbness in his feet that ascends up to his knees often when he wakes up in the morning.  Of note, he does have chronic low back pain.  On exam, he is noted to have distal vibratory sensory loss.  He is noted to have brisk DTRs in his upper extremities.  I reviwed his workup at [facility].  Labs were unremarkable.  MRI  C Spine did show moderate spinal stenosis.    Other considerations for his symptoms include lumbar spinal " stenosis.  His EMG/NCV from 2024 did suggest a lumbar radiculopathy.    Recommend MRI L SPine        Spencer Driscoll MD  [facility]  Department of Neurology      A copy of this note was sent to the referring provider.         [1]   Past Medical History:  Diagnosis Date    Benign prostatic hyperplasia     Cancer (Multi)     Dependence on other enabling machines and devices     History of continuous positive airway pressure (CPAP) therapy at home    Encounter for general adult medical examination without abnormal findings 07/12/2019    Welcome to Medicare preventive visit    Erectile dysfunction     Hip pain, acute, right     Hypertension     Low back pain     Numbness September 2025    Peripheral neuropathy September 2025    Personal history of other endocrine, nutritional and metabolic disease 03/03/2015    History of type 2 multiple endocrine neoplasia (MEN)    Sleep apnea     Urinary incontinence     Urinary tract infection    [2]   Past Surgical History:  Procedure Laterality Date    APPENDECTOMY  03/03/2015    Appendectomy    COLONOSCOPY  03/03/2015    Complete Colonoscopy    HERNIA REPAIR  03/03/2015    Hernia Repair    LUMBAR LAMINECTOMY  2002    OTHER SURGICAL HISTORY  02/17/2017    Laminectomy Decompressive Up To Two Lumbar Segments    OTHER SURGICAL HISTORY  04/08/2021    Back surgery    SINUS SURGERY  03/03/2015    Sinus Surgery    TONSILLECTOMY  03/03/2015    Tonsillectomy    TOTAL THYROIDECTOMY  01/05/2022    Thyroid Surgery Total Thyroidectomy   [3]   Family History  Problem Relation Name Age of Onset    Breast cancer Mother      Other (mesothelioma) Father Dilshad Sr     Cancer Father Dilshad Sr     Breast cancer Sister      Diabetes type II Sister     [4]   Current Outpatient Medications:     amLODIPine (Norvasc) 5 mg tablet, Take 1 tablet (5 mg) by mouth once daily., Disp: 90 tablet, Rfl: 1    azelastine (Astelin) 137 mcg (0.1 %) nasal spray, Administer into affected nostril(s)., Disp: ,  Rfl:     cetirizine (ZyrTEC) 10 mg tablet, Take 1 tablet (10 mg) by mouth if needed., Disp: , Rfl:     desoximetasone (Topicort) 0.25 % cream, Desoximetasone 0.25 % External Cream APPLY AND GENTLY MASSAGE INTO AFFECTED AREA(S) TWICE DAILY. as needed Refills: 0 Start : 26-Oct-2016 Active, Disp: , Rfl:     famotidine (Pepcid) 20 mg tablet, Take 1 tablet (20 mg) by mouth once daily., Disp: , Rfl:     ferrous sulfate (Slow Fe) 137 mg (45 mg iron) tablet extended release, Take 1 tablet (45 mg of iron) by mouth once daily., Disp: , Rfl:     hydrocortisone 1 % ointment, Apply topically if needed., Disp: , Rfl:     levothyroxine (Synthroid, Levoxyl) 112 mcg tablet, TAKE 1 TABLET (112 MCG) BY MOUTH ONCE DAILY IN THE MORNING. TAKE ON EMPTY STOMACH, Disp: 90 tablet, Rfl: 2    lovastatin (Mevacor) 40 mg tablet, Take 1 tablet (40 mg) by mouth once daily at bedtime., Disp: 90 tablet, Rfl: 2    MELATONIN ORAL, Take 1 mg by mouth. Take as directed, Disp: , Rfl:     neomycin-polymyxin-pramoxine (Neosporin) 3.5-10,000-10 mg-unit-mg/gram cream, Apply topically., Disp: , Rfl:     tadalafil (Cialis) 10 mg tablet, Take 1 tablet (10 mg) by mouth if needed for erectile dysfunction., Disp: 30 tablet, Rfl: 11    triamcinolone (Nasacort) 55 mcg nasal inhaler, Administer into affected nostril(s)., Disp: , Rfl:     DULoxetine (Cymbalta) 30 mg DR capsule, Take 1 capsule (30 mg) by mouth once daily. Do not crush or chew. One every evening (Patient not taking: Reported on 5/7/2025), Disp: 30 capsule, Rfl: 6    meloxicam (Mobic) 15 mg tablet, TAKE 1 TABLET (15 MG) BY MOUTH ONCE DAILY. TO TAKE WITH FOOD AND 1 GLASS OF WATER, Disp: 30 tablet, Rfl: 1

## 2025-05-07 NOTE — PROGRESS NOTES
Subjective   Sidney Fischer is a 74 y.o. right-handed male referred for evaluation of possible neuropathy. He describes symptoms of numbness. Onset of symptoms was September of last year (2024). Symptoms are currently of mild severity. Symptoms occur at bedtime, during sleep, and severity also depends on how long he sleeps and last could last hours to all day. He denies burning, lancinating pain, tingling, cramping, squeezing, hypersensitivity, and allodynia. Symptoms are symmetric and worse in the lower extremities. He also describes autonomic symptoms of  dry eyes. He denies orthostasis, bloating, nausea, early satiety, diarrhea, constipation, alternating diarrhea and constipation, dry mouth, dry eyes and dry mouth, blurred vision, lack of sweating, and sexual dysfunction. Previous treatment has included duloxetine, which had improved the symptoms. He did stop the Duloxetine 3 weeks ago because he was unable to sleep he started at 60 mg and was dropped to 30  mg.

## 2025-05-15 ENCOUNTER — HOSPITAL ENCOUNTER (OUTPATIENT)
Dept: RADIOLOGY | Facility: CLINIC | Age: 75
Discharge: HOME | End: 2025-05-15
Payer: MEDICARE

## 2025-05-15 ENCOUNTER — HOSPITAL ENCOUNTER (OUTPATIENT)
Dept: RADIOLOGY | Facility: HOSPITAL | Age: 75
Discharge: HOME | End: 2025-05-15
Payer: MEDICARE

## 2025-05-15 ENCOUNTER — APPOINTMENT (OUTPATIENT)
Dept: RADIOLOGY | Facility: HOSPITAL | Age: 75
End: 2025-05-15
Payer: MEDICARE

## 2025-05-15 DIAGNOSIS — M25.552 LEFT HIP PAIN: ICD-10-CM

## 2025-05-15 DIAGNOSIS — R20.0 LOWER EXTREMITY NUMBNESS: ICD-10-CM

## 2025-05-15 DIAGNOSIS — G89.29 CHRONIC LOW BACK PAIN, UNSPECIFIED BACK PAIN LATERALITY, UNSPECIFIED WHETHER SCIATICA PRESENT: ICD-10-CM

## 2025-05-15 DIAGNOSIS — M54.50 CHRONIC LOW BACK PAIN, UNSPECIFIED BACK PAIN LATERALITY, UNSPECIFIED WHETHER SCIATICA PRESENT: ICD-10-CM

## 2025-05-15 PROCEDURE — 73502 X-RAY EXAM HIP UNI 2-3 VIEWS: CPT | Mod: LT

## 2025-05-15 PROCEDURE — 72148 MRI LUMBAR SPINE W/O DYE: CPT

## 2025-05-20 DIAGNOSIS — M48.061 SPINAL STENOSIS OF LUMBAR REGION, UNSPECIFIED WHETHER NEUROGENIC CLAUDICATION PRESENT: Primary | ICD-10-CM

## 2025-05-21 ENCOUNTER — TELEMEDICINE (OUTPATIENT)
Dept: UROLOGY | Facility: CLINIC | Age: 75
End: 2025-05-21
Payer: MEDICARE

## 2025-05-21 ENCOUNTER — APPOINTMENT (OUTPATIENT)
Dept: RADIOLOGY | Facility: HOSPITAL | Age: 75
End: 2025-05-21
Payer: MEDICARE

## 2025-05-21 DIAGNOSIS — R35.0 BENIGN PROSTATIC HYPERPLASIA WITH URINARY FREQUENCY: ICD-10-CM

## 2025-05-21 DIAGNOSIS — N28.1 KIDNEY CYSTS: Primary | ICD-10-CM

## 2025-05-21 DIAGNOSIS — N40.1 BENIGN PROSTATIC HYPERPLASIA WITH URINARY FREQUENCY: ICD-10-CM

## 2025-05-21 PROCEDURE — G2211 COMPLEX E/M VISIT ADD ON: HCPCS | Performed by: UROLOGY

## 2025-05-21 PROCEDURE — 99214 OFFICE O/P EST MOD 30 MIN: CPT | Performed by: UROLOGY

## 2025-05-21 NOTE — PROGRESS NOTES
Subjective   Gaurav Fischer is a 74 y.o. male male with history of BPH s/p HoLEP on 8/26/2024 and ED, presenting today for a follow up visit. During his last visit patient had complaints of occasional mild residual stress incontinence. Today, he reports almost complete resolution of incontinence. Patient underwent MRI of spine on 5/15/2025 which showed exophytic lesion from the right kidney. Denies any recent gross hematuria, fevers, chills, urinary retention, intractable flank or abdominal pain, nausea or vomiting.          Past Medical History:   Diagnosis Date    Benign prostatic hyperplasia     Cancer (Multi)     Dependence on other enabling machines and devices     History of continuous positive airway pressure (CPAP) therapy at home    Encounter for general adult medical examination without abnormal findings 07/12/2019    Welcome to Medicare preventive visit    Erectile dysfunction     Hip pain, acute, right     Hypertension     Low back pain     Numbness September 2025    Peripheral neuropathy September 2025    Personal history of other endocrine, nutritional and metabolic disease 03/03/2015    History of type 2 multiple endocrine neoplasia (MEN)    Sleep apnea     Urinary incontinence     Urinary tract infection      Past Surgical History:   Procedure Laterality Date    APPENDECTOMY  03/03/2015    Appendectomy    COLONOSCOPY  03/03/2015    Complete Colonoscopy    HERNIA REPAIR  03/03/2015    Hernia Repair    LUMBAR LAMINECTOMY  2002    OTHER SURGICAL HISTORY  02/17/2017    Laminectomy Decompressive Up To Two Lumbar Segments    OTHER SURGICAL HISTORY  04/08/2021    Back surgery    SINUS SURGERY  03/03/2015    Sinus Surgery    TONSILLECTOMY  03/03/2015    Tonsillectomy    TOTAL THYROIDECTOMY  01/05/2022    Thyroid Surgery Total Thyroidectomy     Family History   Problem Relation Name Age of Onset    Breast cancer Mother      Other (mesothelioma) Father Dilshad Sr     Cancer Father Dilshad Sr     Breast  cancer Sister      Diabetes type II Sister       Current Outpatient Medications   Medication Sig Dispense Refill    lovastatin (Mevacor) 40 mg tablet Take 1 tablet (40 mg) by mouth once daily at bedtime. 90 tablet 0    amLODIPine (Norvasc) 5 mg tablet Take 1 tablet (5 mg) by mouth once daily. 90 tablet 1    azelastine (Astelin) 137 mcg (0.1 %) nasal spray Administer into affected nostril(s).      cetirizine (ZyrTEC) 10 mg tablet Take 1 tablet (10 mg) by mouth if needed.      desoximetasone (Topicort) 0.25 % cream Desoximetasone 0.25 % External Cream APPLY AND GENTLY MASSAGE INTO AFFECTED AREA(S) TWICE DAILY. as needed Refills: 0 Start : 26-Oct-2016 Active      DULoxetine (Cymbalta) 30 mg DR capsule Take 1 capsule (30 mg) by mouth once daily. Do not crush or chew. One every evening (Patient not taking: Reported on 5/7/2025) 30 capsule 6    famotidine (Pepcid) 20 mg tablet Take 1 tablet (20 mg) by mouth once daily.      ferrous sulfate (Slow Fe) 137 mg (45 mg iron) tablet extended release Take 1 tablet (45 mg of iron) by mouth once daily.      hydrocortisone 1 % ointment Apply topically if needed.      levothyroxine (Synthroid, Levoxyl) 112 mcg tablet TAKE 1 TABLET (112 MCG) BY MOUTH ONCE DAILY IN THE MORNING. TAKE ON EMPTY STOMACH 90 tablet 2    MELATONIN ORAL Take 1 mg by mouth. Take as directed      meloxicam (Mobic) 15 mg tablet TAKE 1 TABLET (15 MG) BY MOUTH ONCE DAILY. TO TAKE WITH FOOD AND 1 GLASS OF WATER 30 tablet 1    neomycin-polymyxin-pramoxine (Neosporin) 3.5-10,000-10 mg-unit-mg/gram cream Apply topically.      tadalafil (Cialis) 10 mg tablet Take 1 tablet (10 mg) by mouth if needed for erectile dysfunction. 30 tablet 11    triamcinolone (Nasacort) 55 mcg nasal inhaler Administer into affected nostril(s).       No current facility-administered medications for this visit.     Allergies   Allergen Reactions    Sulfamethoxazole-Trimethoprim Anaphylaxis     patient states throat swells    Other Other      Mildew    Griseofulvin Itching     antifungals    Pollen Extracts Other     sneezing     Social History     Socioeconomic History    Marital status:      Spouse name: Not on file    Number of children: Not on file    Years of education: Not on file    Highest education level: Not on file   Occupational History    Not on file   Tobacco Use    Smoking status: Former     Current packs/day: 0.00     Average packs/day: 0.3 packs/day for 0.3 years (0.1 ttl pk-yrs)     Types: Cigars, Cigarettes     Start date: 1971     Quit date: 1971     Years since quittin.5    Smokeless tobacco: Never   Vaping Use    Vaping status: Never Used   Substance and Sexual Activity    Alcohol use: Yes     Alcohol/week: 2.0 standard drinks of alcohol     Types: 2 Cans of beer per week     Comment: 1/2 can per day usually    Drug use: Never    Sexual activity: Yes     Partners: Female     Birth control/protection: None   Other Topics Concern    Not on file   Social History Narrative    Not on file     Social Drivers of Health     Financial Resource Strain: Not on file   Food Insecurity: Not on file   Transportation Needs: Not on file   Physical Activity: Not on file   Stress: Not on file   Social Connections: Not on file   Intimate Partner Violence: Not on file   Housing Stability: Not on file       Review of Systems  Pertinent items are noted in HPI.    Objective           Lab Review  Lab Results   Component Value Date    WBC 6.6 2025    RBC 4.97 2025    HGB 14.7 2025    HCT 43.4 2025     2025      Lab Results   Component Value Date    BUN 17 2025    CREATININE 1.00 2025        Assessment/Plan   There are no diagnoses linked to this encounter.    BPH s/p HoLEP on 2024  Mild stress incontinence     Patient reports improvement in stress incontinence.     We will continue to monitor and follow up in 1 year.     Renal cyst     Patient underwent MRI of spine on 5/15/2025  which showed exophytic lesion from the right kidney.     I reviewed renal US from 11/16/2020 which showed  a 1.1 cm cyst in the interpolar right kidney with a single septation and without internal vascular flow.     We will obtain a renal US and follow up virtually to review.     All questions were answered to the patient's satisfaction. Patient agrees with the plan and wishes to proceed. Follow-up will be scheduled appropriately.     E&M visit today is associated with current or anticipated ongoing medical care services related to a patient's single, serious condition or a complex condition.    Scribed for Dr. Tejada by Nolvia Arenas. I , Dr Tejada, have personally reviewed and agreed with the information entered by the Virtual Scribe.

## 2025-05-22 ENCOUNTER — OFFICE VISIT (OUTPATIENT)
Dept: CARDIOLOGY | Facility: CLINIC | Age: 75
End: 2025-05-22
Payer: MEDICARE

## 2025-05-22 VITALS
BODY MASS INDEX: 29.92 KG/M2 | HEIGHT: 70 IN | WEIGHT: 209 LBS | SYSTOLIC BLOOD PRESSURE: 145 MMHG | DIASTOLIC BLOOD PRESSURE: 85 MMHG | OXYGEN SATURATION: 97 % | HEART RATE: 69 BPM

## 2025-05-22 DIAGNOSIS — E78.49 OTHER HYPERLIPIDEMIA: ICD-10-CM

## 2025-05-22 DIAGNOSIS — R93.1 ELEVATED CORONARY ARTERY CALCIUM SCORE: ICD-10-CM

## 2025-05-22 DIAGNOSIS — I10 PRIMARY HYPERTENSION: Primary | ICD-10-CM

## 2025-05-22 DIAGNOSIS — Z01.810 PREOP CARDIOVASCULAR EXAM: ICD-10-CM

## 2025-05-22 DIAGNOSIS — I48.0 PAROXYSMAL ATRIAL FIBRILLATION (MULTI): ICD-10-CM

## 2025-05-22 PROCEDURE — 3079F DIAST BP 80-89 MM HG: CPT | Performed by: INTERNAL MEDICINE

## 2025-05-22 PROCEDURE — 99215 OFFICE O/P EST HI 40 MIN: CPT | Performed by: INTERNAL MEDICINE

## 2025-05-22 PROCEDURE — 1159F MED LIST DOCD IN RCRD: CPT | Performed by: INTERNAL MEDICINE

## 2025-05-22 PROCEDURE — 1036F TOBACCO NON-USER: CPT | Performed by: INTERNAL MEDICINE

## 2025-05-22 PROCEDURE — 3077F SYST BP >= 140 MM HG: CPT | Performed by: INTERNAL MEDICINE

## 2025-05-22 PROCEDURE — 1125F AMNT PAIN NOTED PAIN PRSNT: CPT | Performed by: INTERNAL MEDICINE

## 2025-05-22 PROCEDURE — G2211 COMPLEX E/M VISIT ADD ON: HCPCS | Performed by: INTERNAL MEDICINE

## 2025-05-22 PROCEDURE — 3008F BODY MASS INDEX DOCD: CPT | Performed by: INTERNAL MEDICINE

## 2025-05-22 PROCEDURE — 93005 ELECTROCARDIOGRAM TRACING: CPT | Performed by: INTERNAL MEDICINE

## 2025-05-22 RX ORDER — AMINOPHYLLINE 25 MG/ML
125 INJECTION, SOLUTION INTRAVENOUS ONCE AS NEEDED
OUTPATIENT
Start: 2025-05-22

## 2025-05-22 RX ORDER — ASPIRIN 81 MG/1
1 TABLET ORAL DAILY
COMMUNITY

## 2025-05-22 RX ORDER — REGADENOSON 0.08 MG/ML
0.4 INJECTION, SOLUTION INTRAVENOUS
OUTPATIENT
Start: 2025-05-22

## 2025-05-22 ASSESSMENT — ENCOUNTER SYMPTOMS
EYE DISCHARGE: 0
DIAPHORESIS: 0
SHORTNESS OF BREATH: 0
DYSPNEA ON EXERTION: 0
PSYCHIATRIC NEGATIVE: 1
LOSS OF BALANCE: 0
ORTHOPNEA: 0
FALLS: 0
PND: 0
DECREASED LIBIDO: 0
WHEEZING: 0
BLURRED VISION: 0
ABDOMINAL PAIN: 0
IRREGULAR HEARTBEAT: 1
COUGH: 0
FEVER: 0
DECREASED APPETITE: 0
NEAR-SYNCOPE: 0
CHANGE IN BOWEL HABIT: 0
BACK PAIN: 0
CLAUDICATION: 0
HEADACHES: 0
DIZZINESS: 0
BOWEL INCONTINENCE: 0
LIGHT-HEADEDNESS: 0
CHILLS: 0
HEMOPTYSIS: 0
EYE PAIN: 0
JOINT SWELLING: 0
DOUBLE VISION: 0
SPUTUM PRODUCTION: 0
ANOREXIA: 0
BLOATING: 0
PALPITATIONS: 1
ENDOCRINE NEGATIVE: 1

## 2025-05-22 ASSESSMENT — PAIN SCALES - GENERAL: PAINLEVEL_OUTOF10: 2

## 2025-05-22 NOTE — PROGRESS NOTES
"Chief Complaint:   Follow-up and Pre-op Clearance     History Of Present Illness:    Sidney Fischer is a 74 y.o. male presenting with a pertinent medical history notable for essential hypertension, BPH, alpha 1 antitrypsin, dy/sl/ipidemia, dyspnea on exertion, gastroesophageal reflux disease, hypersomnia with obstructive sleep apnea, history of medullary t//hyroid carcinoma who presents to cardiology for follow up of of heart palpitations and presyncope and dyslipidemia with elevated CAC      His history is rather convoluted. Recall   --States that he will have intermittent periods of sensations of a \"pounding heart\" that usually occur in the evening. He notes that these usually occur after day of heavy exertion, such as shoveling snow, moving 50 pound bags of lawn care materials, or watching baseball games in the sun. He reports that he has no chest discomfort, palpitations, heaviness, dyspnea exertion during these episodes, usually occurs right when he is about to go to bed later that night.   He also reports intermittent periods of presyncope, lightheadedness, dizziness that occur while he is in [place]. States that his symptoms will somewhat chanel when he is able to sit back down. Further, he is very concerned about \"circulation problems\" in his hands and arms. He reports intermittent numbness, tingling as well as change in temperature of his hands. Additionally, he is concerned about \"things building up in my heart\" and he has been doing \"a lot of research about coronary calcium and wonders whether he should have this done\"      Since he was last seen, he reports that many of his symptoms have improved following reduction his his thyroid replacement.Otherwise, no issues. He feels dramatically improved. He has been following his cholesterol levels, and is shocked at how well he is responding to his current medication. He reports no myalgias, arthralgias.     Today ( 2/6/2023) he returns for scheduled follow-up. He " "reports that his dizziness has improved, feels that this was related to his levothyroxine dose. He notes that he has not had any issues with heart palpitations. He has been shooting pool to stay active., goes square dancing every other weekend. States that he had been out of breath several years ago, but no longer. He is able to keep up and enjoys it.     10/30/2023 -- He returns for follow up. He had been doing quiet well until about 1 week prior. He had an episode of heart palpitations where he heart was \"Pounding\" and \"Slow\"  This occurred about the time of COVID vaccine. He notes that he had been in [employer] and walked ~ 7 miles daily. He was able to climb 1000' ascent ( 5500 --> 6500 feet of elevation) without chest pressure or pain, shortness of breath. He had attended a [facility] screening ( [facility]) where his cholesterol panel was obtained showing very acceptable levels with TC < 120. Its is surprising that it wasn't uploaded to his chart.  He is otherwise doing well.     9/16/2024 -- He returns for Follow up.  Since he was last seen, he has continued to do well.  He has been active.  He has gotten out of golf 2 rounds, and feels good about this.  He continues to exercise, and denies any shortness of breath, chest pain, heart palpitations with exercise.  He feels that he is slowing down some but otherwise offers no cardiovascular complaints.    2/24/2025 --> He returns for follow up. He has noted increased heart palpitations.  He has also noticed that he has had increased lower extremity pain, numbness, tingling.  It started within his toes, is now up to his knees bilaterally.  He feels that this may be more associated when he is laying down, resting, feet elevation.  He was started on duloxetine which has been helpful for some of his symptomatology.  He worries whether or not his heart palpitations may represent atrial fibrillation and whether the numbness and tingling may be related to " "thrombus burden.  He has also noticed increased symptoms that he associates with chronic iron deficiency anemia nemia, and has started using over-the-counter iron supplementation.    2025--> He returns for follow up.  Since he was last seen, he reports that his hip has been bother thing him more and he is starting to be impacted. He met with Orthopedic Surgery for possible intervention who requested a \"letter or paragraph\" stating that he is OK to go?  He has not been assessed by PAT and has not date for surgery?  We discussed the results of his event monitor which did show ~ 3-4 hours of Nocturnal Atrial Fibrillation on current study.         Patient denies chest pain and angina.  Pt denies shortness of breath, dyspnea on exertion, orthopnea, and paroxysmal nocturnal dyspnea.  Pt denies worsening lower extremity edema.  Pt denies palpitations or syncope.  No recent falls.  No fever or chills.  No cough.  No change in bowel or bladder habits.  No travel.  No sick contacts.  No recent travel    12 point review of systems was performed and is otherwise negative.  Past medical history:  As above.  Medications:  Reviewed.  Allergies:  Reviewed.  Social history:    Social History     Tobacco Use    Smoking status: Former     Current packs/day: 0.00     Average packs/day: 0.3 packs/day for 0.3 years (0.1 ttl pk-yrs)     Types: Cigars, Cigarettes     Start date: 1971     Quit date: 1971     Years since quittin.5    Smokeless tobacco: Never   Vaping Use    Vaping status: Never Used   Substance Use Topics    Alcohol use: Yes     Alcohol/week: 2.0 standard drinks of alcohol     Types: 2 Cans of beer per week    Drug use: Never       Family history:    Family History   Problem Relation Name Age of Onset    Breast cancer Mother      Other (mesothelioma) Father Dilshad Sr     Cancer Father Dilshad Sr     Breast cancer Sister      Diabetes type II Sister           Review of Systems   Constitutional: Negative " "for chills, decreased appetite, diaphoresis and fever.   HENT:  Negative for congestion, ear discharge, ear pain and hearing loss.    Eyes:  Negative for blurred vision, discharge, double vision and pain.   Cardiovascular:  Positive for irregular heartbeat and palpitations. Negative for chest pain, claudication, cyanosis, dyspnea on exertion, leg swelling, near-syncope, orthopnea and paroxysmal nocturnal dyspnea.   Respiratory:  Negative for cough, hemoptysis, shortness of breath, sputum production and wheezing.    Endocrine: Negative.    Hematologic/Lymphatic: Negative for bleeding problem.   Skin: Negative.    Musculoskeletal:  Negative for arthritis, back pain, falls, joint pain, joint swelling and muscle weakness.   Gastrointestinal:  Negative for bloating, abdominal pain, anorexia, change in bowel habit and bowel incontinence.   Genitourinary:  Negative for bladder incontinence and decreased libido.   Neurological:  Negative for dizziness, headaches, light-headedness and loss of balance.   Psychiatric/Behavioral: Negative.           Last Recorded Vitals:  Vitals:    05/22/25 1034   BP: 145/85   BP Location: Right arm   Patient Position: Sitting   Pulse: 69   SpO2: 97%   Weight: 94.8 kg (209 lb)   Height: 1.765 m (5' 9.5\")         Past Medical History:  He has a past medical history of Benign prostatic hyperplasia, Cancer (Multi), Dependence on other enabling machines and devices, Encounter for general adult medical examination without abnormal findings (07/12/2019), Erectile dysfunction, Hip pain, acute, right, Hypertension, Low back pain, Numbness (September 2025), Peripheral neuropathy (September 2025), Personal history of other endocrine, nutritional and metabolic disease (03/03/2015), Sleep apnea, Urinary incontinence, and Urinary tract infection.    Past Surgical History:  He has a past surgical history that includes Colonoscopy (03/03/2015); Appendectomy (03/03/2015); Tonsillectomy (03/03/2015); Sinus " surgery (03/03/2015); Hernia repair (03/03/2015); Other surgical history (02/17/2017); Other surgical history (04/08/2021); Total thyroidectomy (01/05/2022); and Lumbar laminectomy (2002).      Social History:  He reports that he quit smoking about 53 years ago. His smoking use included cigars and cigarettes. He started smoking about 53 years ago. He has a 0.1 pack-year smoking history. He has never used smokeless tobacco. He reports current alcohol use of about 2.0 standard drinks of alcohol per week. He reports that he does not use drugs.    Family History:  Family History   Problem Relation Name Age of Onset    Breast cancer Mother      Other (mesothelioma) Father Dilshad Sr     Cancer Father Dilshad Sr     Breast cancer Sister      Diabetes type II Sister          Allergies:  Sulfamethoxazole-trimethoprim, Other, Griseofulvin, and Pollen extracts    Outpatient Medications:  Current Outpatient Medications   Medication Instructions    amLODIPine (NORVASC) 5 mg, oral, Daily    apixaban (ELIQUIS) 5 mg, oral, 2 times daily    aspirin 81 mg tablet 1 tablet, Daily    azelastine (Astelin) 137 mcg (0.1 %) nasal spray Administer into affected nostril(s).    cetirizine (ZYRTEC) 10 mg, As needed    desoximetasone (Topicort) 0.25 % cream Desoximetasone 0.25 % External Cream APPLY AND GENTLY MASSAGE INTO AFFECTED AREA(S) TWICE DAILY. as needed Refills: 0 Start : 26-Oct-2016 Active    DULoxetine (CYMBALTA) 30 mg, oral, Daily, Do not crush or chew. One every evening    famotidine (PEPCID) 20 mg, Daily    ferrous sulfate (Slow Fe) 137 mg (45 mg iron) tablet extended release 1 tablet, Daily    hydrocortisone 1 % ointment As needed    levothyroxine (SYNTHROID, LEVOXYL) 112 mcg, oral, Every morning, Take on empty stomach    lovastatin (MEVACOR) 40 mg, oral, Nightly    MELATONIN ORAL 1 mg    meloxicam (MOBIC) 15 mg, oral, Daily, To take with food and 1 glass of water    neomycin-polymyxin-pramoxine (Neosporin) 3.5-10,000-10  "mg-unit-mg/gram cream Apply topically.    tadalafil (CIALIS) 10 mg, oral, As needed    triamcinolone (Nasacort) 55 mcg nasal inhaler Administer into affected nostril(s).       Physical Exam:  /85 (BP Location: Right arm, Patient Position: Sitting)   Pulse 69   Ht 1.765 m (5' 9.5\")   Wt 94.8 kg (209 lb)   SpO2 97%   BMI 30.42 kg/m²   General:  Patient is awake, alert, and oriented.  Patient is in no acute distress.  HEENT:  Pupils equal and reactive.  Normocephalic.  Moist mucosa.    Neck:  No thyromegaly.  Normal Jugular Venous Pressure.  Cardiovascular:  Regular rate and rhythm.  Normal S1 and S2.  1/6 TEETEE.  Pulmonary:  Clear to auscultation bilaterally.  Abdomen:  Soft. Non-tender.   Non-distended.  Positive bowel sounds.  Lower Extremities:  2+ pedal pulses. No LE edema.  Neurologic:  Cranial nerves intact.  No focal deficit.   Skin: Skin warm and dry, normal skin turgor.   Psychiatric: Normal affect.      Last Labs:  CBC -  Lab Results   Component Value Date    WBC 6.6 02/24/2025    HGB 14.7 02/24/2025    HCT 43.4 02/24/2025    MCV 87.3 02/24/2025     02/24/2025       CMP -  Lab Results   Component Value Date    CALCIUM 9.2 02/24/2025    PHOS 3.4 02/06/2023    PROT 7.2 02/24/2025    ALBUMIN 4.9 02/24/2025    AST 26 02/24/2025    ALT 22 02/24/2025    ALKPHOS 80 02/24/2025    BILITOT 0.8 02/24/2025       LIPID PANEL -   Lab Results   Component Value Date    CHOL 129 02/24/2025    TRIG 89 02/24/2025    HDL 43 02/24/2025    CHHDL 3.0 02/24/2025    LDLF 69 07/14/2023    VLDL 16 01/19/2024    NHDL 86 02/24/2025       RENAL FUNCTION PANEL -   Lab Results   Component Value Date    GLUCOSE 83 02/24/2025     02/24/2025    K 4.4 02/24/2025     02/24/2025    CO2 27 02/24/2025    ANIONGAP 10 02/24/2025    BUN 17 02/24/2025    CREATININE 1.00 02/24/2025    GFRMALE 71 07/14/2023    CALCIUM 9.2 02/24/2025    PHOS 3.4 02/06/2023    ALBUMIN 4.9 02/24/2025        Lab Results   Component Value Date    " HGBA1C 5.3 12/20/2024       Last Cardiology Tests:  ECG:  In Office EKG 5/22/2025 --> NSR        Event monitor 2/2025  Prescribed time 14 days, diagnostic time 13 days 12 hours 30 minutes  Total analyzed beats 1,568,632  Predominant rhythm sinus rhythm  Minimum heart rate 50, maximum heart rate 131, average heart rate 81  636 ventricular ectopic beats burden less than 1%  1406 supraventricular ectopic beats burden less than 1%.  4 episodes of SVT longest 4 beats  Atrial fibrillation was detected.  2% atrial fibrillation of which 1% of total atrial fibrillation spent in RVR.  Longest episode 3 hours 39 minutes 9 seconds occurring at 0 125      IN Office EKG 02/24/2025      Echo:  05/2021   1. The left ventricular systolic function is normal with a 60-65% estimated ejection fraction.   2. Borderline increased LV mass.   3. Moderately increased left ventricular septal thickness.   4. RVSP within normal limits.      Cardiac Imaging:  CT HEART CALCIUM SCORING WO IV CONTRAST 2/13/2023  FINDINGS:  The score and distribution of calcium in the coronary arteries is as  follows:     .49,  .35,  LCx 0,  RCA 0,     Total   829.84    Lab review: I have personally reviewed the laboratory result(s)   Diagnostic review: I have personally reviewed the result(s) of the EKG and Echocardiogram .   Imaging review: I have  personally reviewed the result(s) CT Coronary Artery Calcium Scoring     Assessment/Plan   Problem List Items Addressed This Visit           ICD-10-CM    Elevated coronary artery calcium score R93.1    Relevant Orders    ECG 12 lead (Clinic Performed)    Cardiology Interpretation Of Nuclear Stress - See Other Report For Nuclear Portion    Nuclear Stress Test    Other hyperlipidemia E78.49    Relevant Orders    ECG 12 lead (Clinic Performed)    Paroxysmal atrial fibrillation (Multi) I48.0    Begin oral anticoagulation for elevated [id number] score  --Apixaban 5 mg twice daily as he does not meet  requirements for dose reduction at this time  --Minimal burden, will hold off on rate control at this time.  Depending on symptomatology, may consider ablation therapy--as he would be a good candidate for this.         Relevant Medications    apixaban (Eliquis) 5 mg tablet    Other Relevant Orders    ECG 12 lead (Clinic Performed)    Preop cardiovascular exam Z01.810    Final restratification will be delivered after completion of stress testing.  At this time, we would certainly favor anticoagulation for DVT prophylaxis and that he now carries a diagnosis of atrial fibrillation and would benefit continued use of anticoagulation          Relevant Orders    ECG 12 lead (Clinic Performed)    Cardiology Interpretation Of Nuclear Stress - See Other Report For Nuclear Portion    Nuclear Stress Test    Primary hypertension - Primary I10    Relevant Orders    ECG 12 lead (Clinic Performed)     -- Return 1 to 2 weeks after completion of all cardiac testing at this time      Quincy Benavides DO   Division of Cardiovascular Medicine  [facility]

## 2025-05-22 NOTE — PATIENT INSTRUCTIONS
"It was a pleasure seeing you today. I would like to see you back in clinic in  4  months. You can call my office if questions arise between now and our next visit.     Today, we talked about your overall health   --  For radiology scheduling (Cardiac calcium score, CTA with HeartFlow, Cardiac MRI, NM cardiac stress test, PET viability study) the phone number is [phone]  -- >please try to schedule at [facility] as they have the newest scanner for the best images     -- Once you have this scheduled, please call us back and we will complete risk stratification via Telehealth so that you can proceed     -- You have ~ 4 hours of nocturnal Atrial Fibrillation. We will begin Anticoagulation to help protect you from strokes in Atrial Fibrillation.     -- Please resume daily aspirin 81 mg     -- Take your Iron Tablet with a \"Shot\" of orange juice. The Citric Acid helps with the absorption. The other option is to talk with the pharmacy about a \"Slow Release\" Iron supplement to help with the constipation issues  -- Try and use Polyethelyen Glycol ( Go-Lytelye )   -- You an also use a Glycerin Suppository form the bottom as well.    -- Please continue your current medications     -- Let us know if you feel more heart palpitations, if they are longer in duration as we will need to look at an event monitor .     Below are some Heart Health Tips that we provide to all of our patients. I hope you fing them useful.     - If you are having problems with medications, consider looking at the following websites.   --  \"GoodRx\"   --  \"[facility]\"      - We are happy to supply written prescriptions if needed to allow you to obtain your medications from different pharmacies. Additionally, if you are having issues with mail order delivery, please let us know. We can send a limited supply of your medications to your local pharmacy.     -  We recommend you follow a heart healthy diet. Watch food labels and try not to " eat more than 2,500 mg of sodium per day. Avoid foods high in salt like processed meats (lunch meats, mullins, and sausage), processed foods (boxed dinners, canned soups), fried and fast foods. Monitor serving sizes and if the sodium per serving size is more than 200 mg, avoid those foods. If the sodium per serving size is between 100-200 mg, you can use those in limited quantities. Try to choose foods where the amount of sodium per serving size is less than 100 mg. Try to eat a diet rich in fruits and vegetables, whole grains, low fat dairy products, skinless poultry and fish, nuts, beans, non-tropical vegetable oils. Limit saturated fat, trans fat, sodium, red meats, and sugar-sweetened beverages.   Limit alcohol     -The combination of a reduced-calorie diet and increased physical activity is recommended. Adults should aim to get at least 150 minutes of moderate physical activity per week (30 minutes of moderate physical activities at least 5 days per week). Examples of moderate physical activities include brisk walking, swimming, aerobic dancing, heavy gardening, jumping rope, bicycling 10 MPH or faster, tennis, hiking uphill or with a heavy backpack. Please let us know if you would like to learn more about your nutrition and calories and additional options including weight loss programs to help you reach your goal.     -If you smoke, stop smoking. If you stop smoking you can help get rid of a major source of stress to your heart. Smoking makes your heart rate and blood pressure go up and increases your risk or developing cardiovascular diseases and worsen symptoms associated with heart failure.     -Obtain a BP monitor and monitor your BP daily. Check it around the same time each day; at least 1 hour after taking your medications. Record your BP in a log and bring your log with you to your doctors appointment.     -F/u with your PCP as recommended.

## 2025-05-22 NOTE — ASSESSMENT & PLAN NOTE
Final restratification will be delivered after completion of stress testing.  At this time, we would certainly favor anticoagulation for DVT prophylaxis and that he now carries a diagnosis of atrial fibrillation and would benefit continued use of anticoagulation

## 2025-05-22 NOTE — ASSESSMENT & PLAN NOTE
Begin oral anticoagulation for elevated [id number] score  --Apixaban 5 mg twice daily as he does not meet requirements for dose reduction at this time  --Minimal burden, will hold off on rate control at this time.  Depending on symptomatology, may consider ablation therapy--as he would be a good candidate for this.

## 2025-05-23 ENCOUNTER — HOSPITAL ENCOUNTER (OUTPATIENT)
Dept: RADIOLOGY | Facility: HOSPITAL | Age: 75
Discharge: HOME | End: 2025-05-23
Payer: MEDICARE

## 2025-05-23 DIAGNOSIS — N28.1 KIDNEY CYSTS: ICD-10-CM

## 2025-05-23 LAB
ATRIAL RATE: 69 BPM
P AXIS: 62 DEGREES
P OFFSET: 177 MS
P ONSET: 129 MS
PR INTERVAL: 194 MS
Q ONSET: 226 MS
QRS COUNT: 11 BEATS
QRS DURATION: 92 MS
QT INTERVAL: 380 MS
QTC CALCULATION(BAZETT): 407 MS
QTC FREDERICIA: 398 MS
R AXIS: 3 DEGREES
T AXIS: 84 DEGREES
T OFFSET: 416 MS
VENTRICULAR RATE: 69 BPM

## 2025-05-23 PROCEDURE — 76770 US EXAM ABDO BACK WALL COMP: CPT

## 2025-05-27 ENCOUNTER — APPOINTMENT (OUTPATIENT)
Dept: ORTHOPEDIC SURGERY | Facility: HOSPITAL | Age: 75
End: 2025-05-27
Payer: MEDICARE

## 2025-05-28 DIAGNOSIS — N28.89 RENAL MASS: ICD-10-CM

## 2025-06-03 ENCOUNTER — TELEPHONE (OUTPATIENT)
Dept: CARDIOLOGY | Facility: HOSPITAL | Age: 75
End: 2025-06-03

## 2025-06-03 ENCOUNTER — APPOINTMENT (OUTPATIENT)
Dept: UROLOGY | Facility: CLINIC | Age: 75
End: 2025-06-03
Payer: MEDICARE

## 2025-06-03 NOTE — TELEPHONE ENCOUNTER
Instructions for nuclear pharmacological/exercise stress test:     Spoke to: patient  Test: Nuclear Pharmacologic Stress Test    Please arrive@ 6:45 am to registration B  This test takes approximately 3-4 hours  Wear comfortable clothing and if you are exercising wear appropriate comfortable shoes  No food 4 hours before your test, water is ok  No caffeine 12 hours before your test- this includes coffee, tea, pop, chocolate, and any decaffeinated beverages  Please do not use nicotine replacement or smoke 4 hours prior to test,

## 2025-06-04 ENCOUNTER — HOSPITAL ENCOUNTER (OUTPATIENT)
Dept: RADIOLOGY | Facility: HOSPITAL | Age: 75
Discharge: HOME | End: 2025-06-04
Payer: MEDICARE

## 2025-06-04 ENCOUNTER — HOSPITAL ENCOUNTER (OUTPATIENT)
Dept: CARDIOLOGY | Facility: HOSPITAL | Age: 75
Discharge: HOME | End: 2025-06-04
Payer: MEDICARE

## 2025-06-04 DIAGNOSIS — R93.1 ELEVATED CORONARY ARTERY CALCIUM SCORE: ICD-10-CM

## 2025-06-04 DIAGNOSIS — Z01.810 PREOP CARDIOVASCULAR EXAM: ICD-10-CM

## 2025-06-04 PROCEDURE — 93016 CV STRESS TEST SUPVJ ONLY: CPT | Performed by: INTERNAL MEDICINE

## 2025-06-04 PROCEDURE — 3430000001 HC RX 343 DIAGNOSTIC RADIOPHARMACEUTICALS: Performed by: INTERNAL MEDICINE

## 2025-06-04 PROCEDURE — 93018 CV STRESS TEST I&R ONLY: CPT | Performed by: INTERNAL MEDICINE

## 2025-06-04 PROCEDURE — 78452 HT MUSCLE IMAGE SPECT MULT: CPT

## 2025-06-04 PROCEDURE — 2500000004 HC RX 250 GENERAL PHARMACY W/ HCPCS (ALT 636 FOR OP/ED): Performed by: INTERNAL MEDICINE

## 2025-06-04 PROCEDURE — A9502 TC99M TETROFOSMIN: HCPCS | Performed by: INTERNAL MEDICINE

## 2025-06-04 PROCEDURE — 93017 CV STRESS TEST TRACING ONLY: CPT

## 2025-06-04 RX ORDER — REGADENOSON 0.08 MG/ML
0.4 INJECTION, SOLUTION INTRAVENOUS
Status: COMPLETED | OUTPATIENT
Start: 2025-06-04 | End: 2025-06-04

## 2025-06-04 RX ADMIN — REGADENOSON 0.4 MG: 0.08 INJECTION, SOLUTION INTRAVENOUS at 08:33

## 2025-06-04 RX ADMIN — TETROFOSMIN 11 MILLICURIE: 0.23 INJECTION, POWDER, LYOPHILIZED, FOR SOLUTION INTRAVENOUS at 07:23

## 2025-06-04 RX ADMIN — TETROFOSMIN 33.3 MILLICURIE: 0.23 INJECTION, POWDER, LYOPHILIZED, FOR SOLUTION INTRAVENOUS at 08:37

## 2025-06-07 ENCOUNTER — APPOINTMENT (OUTPATIENT)
Dept: RADIOLOGY | Facility: HOSPITAL | Age: 75
End: 2025-06-07
Payer: MEDICARE

## 2025-06-10 ENCOUNTER — TELEPHONE (OUTPATIENT)
Dept: ORTHOPEDIC SURGERY | Facility: CLINIC | Age: 75
End: 2025-06-10
Payer: MEDICARE

## 2025-06-10 NOTE — TELEPHONE ENCOUNTER
Copied from CRM #[id number]. Topic: Appointment Scheduled  >> Apr 10, 2025  3:07 PM Brianda RODNEY wrote:  Good Afternoon I have a pt that want to set up an appt but there is nothing on the schedule , he can be reached by the number on file

## 2025-06-11 ENCOUNTER — APPOINTMENT (OUTPATIENT)
Dept: UROLOGY | Facility: CLINIC | Age: 75
End: 2025-06-11
Payer: MEDICARE

## 2025-06-15 ENCOUNTER — HOSPITAL ENCOUNTER (OUTPATIENT)
Dept: RADIOLOGY | Facility: HOSPITAL | Age: 75
Discharge: HOME | End: 2025-06-15
Payer: MEDICARE

## 2025-06-15 DIAGNOSIS — N28.89 RENAL MASS: ICD-10-CM

## 2025-06-15 PROCEDURE — 74183 MRI ABD W/O CNTR FLWD CNTR: CPT | Performed by: STUDENT IN AN ORGANIZED HEALTH CARE EDUCATION/TRAINING PROGRAM

## 2025-06-15 PROCEDURE — 2550000001 HC RX 255 CONTRASTS: Performed by: UROLOGY

## 2025-06-15 PROCEDURE — 74183 MRI ABD W/O CNTR FLWD CNTR: CPT

## 2025-06-15 PROCEDURE — A9575 INJ GADOTERATE MEGLUMI 0.1ML: HCPCS | Performed by: UROLOGY

## 2025-06-15 RX ORDER — GADOTERATE MEGLUMINE 376.9 MG/ML
20 INJECTION INTRAVENOUS
Status: COMPLETED | OUTPATIENT
Start: 2025-06-15 | End: 2025-06-15

## 2025-06-15 RX ADMIN — GADOTERATE MEGLUMINE 20 ML: 376.9 INJECTION INTRAVENOUS at 11:00

## 2025-06-16 ENCOUNTER — APPOINTMENT (OUTPATIENT)
Dept: PRIMARY CARE | Facility: CLINIC | Age: 75
End: 2025-06-16
Payer: MEDICARE

## 2025-06-16 ENCOUNTER — APPOINTMENT (OUTPATIENT)
Dept: CARDIOLOGY | Facility: CLINIC | Age: 75
End: 2025-06-16
Payer: MEDICARE

## 2025-06-17 ENCOUNTER — APPOINTMENT (OUTPATIENT)
Dept: ORTHOPEDIC SURGERY | Facility: HOSPITAL | Age: 75
End: 2025-06-17
Payer: MEDICARE

## 2025-06-20 ENCOUNTER — APPOINTMENT (OUTPATIENT)
Dept: UROLOGY | Facility: CLINIC | Age: 75
End: 2025-06-20
Payer: MEDICARE

## 2025-06-20 DIAGNOSIS — N28.89 RENAL MASS: Primary | ICD-10-CM

## 2025-06-20 PROBLEM — M16.12 UNILATERAL PRIMARY OSTEOARTHRITIS, LEFT HIP: Status: ACTIVE | Noted: 2025-06-20

## 2025-06-20 PROCEDURE — 99214 OFFICE O/P EST MOD 30 MIN: CPT | Performed by: UROLOGY

## 2025-06-20 PROCEDURE — G2211 COMPLEX E/M VISIT ADD ON: HCPCS | Performed by: UROLOGY

## 2025-06-20 NOTE — PROGRESS NOTES
Scribed for Dr. Faby Tejada by Shahab Yun. I, Dr. Faby Tejada, have personally reviewed and agreed with the information entered by the Virtual Scribe. This visit was completed via telemedicine. All issues as below were discussed and addressed but no physical exam was performed unless allowed by visual confirmation. If it was felt that the patient should be evaluated in clinic, then they were directed there. Patient verbal consented to the visit. 06/20/25    History of Present Illness:  TODAY: (06/20/25)  Gaurav Fischer is a 75 y.o. male presenting virtually with history of BPH s/p HoLEP on 8/26/2024, and erectile dysfunction. Following his HoLEP, he c/o occasional mild residual stress incontinence which gradually improved. Last visit, reported was nearly resolved. In the interim, he had a MRI of the spine (05/15/25) which revealed an incidental exophytic right renal lesion.    Presents for virtual follow up and MRI results.   Reports he has been doing well overall.   No acute or worsening complaints.     MRI kidney (06/15/25) personally reviewed and independently interpreted.  Right midpole exophytic solid-cystic renal mass versus solid mass with central necrotic core. Recommend tissue sampling. No vascular or rosemary involvement    Medical History[1]  Surgical History[2]  Family History[3]  Tobacco Use History[4]  Current Medications[5]  Allergies[6]  Past medical, surgical, family and social history in the chart was reviewed and is accurate including any additions to what is in this HPI.    Review of systems:   Pertinent information as listed in the HPI.      Objective   There were no vitals taken for this visit.  Physical Exam:  Exam limited 2/2 being a telehealth visit.     Lab Review:  Lab Results   Component Value Date    WBC 6.6 02/24/2025    RBC 4.97 02/24/2025    HGB 14.7 02/24/2025    HCT 43.4 02/24/2025     02/24/2025      Lab Results   Component Value Date    BUN 17 02/24/2025    CREATININE  1.00 02/24/2025      Lab Results   Component Value Date    HGBA1C 5.3 12/20/2024     Lab Results   Component Value Date    CHOL 129 02/24/2025    TRIG 89 02/24/2025    HDL 43 02/24/2025    LDLDIRECT 66 02/06/2023    ALT 22 02/24/2025    AST 26 02/24/2025     02/24/2025    K 4.4 02/24/2025     02/24/2025    CO2 27 02/24/2025    TSH 1.15 02/24/2025    INR 1.0 08/14/2024    GLUF 96 05/19/2022        ASSESSMENT:  Problem List Items Addressed This Visit    None     PLAN:  #BPH ~ s/p HoLEP on 8/26/2024  #Stress urinary incontinence ~ mild  Patient reported CHRIS following HoLEP which has gradually improved, now nearly completely resolved. Suspect will continue to improve, provided reassurance. We will continue to monitor and follow up in 1 year.     #Renal mass, right ~ 5.0 x 3.8 x 3.8 cm  Discussed non-surgical versus surgical options including continued observation vs percutaneous ablation vs nephrectomy. Risks, benefits, and complications reviewed. I do not recommend continued observation given size and malignant potential. He elects to follow up with Dr. De Anda for an evaluation and further discuss options.   Provided referral to Dr. De Anda for procedural planning.   Follow up will be scheduled appropriately.     Discussion:  Discussed MRI findings with patient which demonstrated a right midpole exophytic renal lesion with solid-cystic features or solid mass with central necrosis. No signs of distant metastasis on imaging. Ddx includes renal cell carcinoma (RCC), oncocytoma, or complex cystic neoplasm (Bosniak IV).     MRI kidney (06/15/25) personally reviewed and independently interpreted.  Right midpole exophytic solid-cystic renal mass versus solid mass with central necrotic core. Recommend tissue sampling. No vascular or rosemary involvement.    All questions were answered to the patient’s satisfaction.  Patient agrees with the plan and wishes to proceed.  Continue follow-up for ongoing care of his chronic  medical conditions.    Scribed for Dr. Faby Tejada by Shahab Yun. I, Dr. Faby Tejada, have personally reviewed and agreed with the information entered by the Virtual Scribe. 25         [1]   Past Medical History:  Diagnosis Date    Benign prostatic hyperplasia     Cancer (Multi)     Dependence on other enabling machines and devices     History of continuous positive airway pressure (CPAP) therapy at home    Encounter for general adult medical examination without abnormal findings 2019    Welcome to Medicare preventive visit    Erectile dysfunction     Hip pain, acute, right     Hypertension     Low back pain     Numbness 2025    Peripheral neuropathy 2025    Personal history of other endocrine, nutritional and metabolic disease 2015    History of type 2 multiple endocrine neoplasia (MEN)    Sleep apnea     Urinary incontinence     Urinary tract infection    [2]   Past Surgical History:  Procedure Laterality Date    APPENDECTOMY  2015    Appendectomy    COLONOSCOPY  2015    Complete Colonoscopy    HERNIA REPAIR  2015    Hernia Repair    LUMBAR LAMINECTOMY  2002    OTHER SURGICAL HISTORY  2017    Laminectomy Decompressive Up To Two Lumbar Segments    OTHER SURGICAL HISTORY  2021    Back surgery    SINUS SURGERY  2015    Sinus Surgery    TONSILLECTOMY  2015    Tonsillectomy    TOTAL THYROIDECTOMY  2022    Thyroid Surgery Total Thyroidectomy   [3]   Family History  Problem Relation Name Age of Onset    Breast cancer Mother      Other (mesothelioma) Father Dilshad Sr     Cancer Father Dilshad Sr     Breast cancer Sister      Diabetes type II Sister     [4]   Social History  Tobacco Use   Smoking Status Former    Current packs/day: 0.00    Average packs/day: 0.3 packs/day for 0.3 years (0.1 ttl pk-yrs)    Types: Cigars, Cigarettes    Start date: 1971    Quit date: 1971    Years since quittin.5   Smokeless Tobacco Never    [5]   Current Outpatient Medications   Medication Sig Dispense Refill    amLODIPine (Norvasc) 5 mg tablet Take 1 tablet (5 mg) by mouth once daily. 90 tablet 1    apixaban (Eliquis) 5 mg tablet Take 1 tablet (5 mg) by mouth 2 times a day. 60 tablet 11    aspirin 81 mg tablet Take 1 tablet by mouth once daily.      azelastine (Astelin) 137 mcg (0.1 %) nasal spray Administer into affected nostril(s).      cetirizine (ZyrTEC) 10 mg tablet Take 1 tablet (10 mg) by mouth if needed.      desoximetasone (Topicort) 0.25 % cream Desoximetasone 0.25 % External Cream APPLY AND GENTLY MASSAGE INTO AFFECTED AREA(S) TWICE DAILY. as needed Refills: 0 Start : 26-Oct-2016 Active      DULoxetine (Cymbalta) 30 mg DR capsule Take 1 capsule (30 mg) by mouth once daily. Do not crush or chew. One every evening (Patient not taking: Reported on 5/22/2025) 30 capsule 6    famotidine (Pepcid) 20 mg tablet Take 1 tablet (20 mg) by mouth once daily.      ferrous sulfate (Slow Fe) 137 mg (45 mg iron) tablet extended release Take 1 tablet (45 mg of elemental iron) by mouth once daily.      hydrocortisone 1 % ointment Apply topically if needed.      levothyroxine (Synthroid, Levoxyl) 112 mcg tablet TAKE 1 TABLET (112 MCG) BY MOUTH ONCE DAILY IN THE MORNING. TAKE ON EMPTY STOMACH 90 tablet 2    lovastatin (Mevacor) 40 mg tablet Take 1 tablet (40 mg) by mouth once daily at bedtime. 90 tablet 0    MELATONIN ORAL Take 1 mg by mouth. Take as directed      meloxicam (Mobic) 15 mg tablet TAKE 1 TABLET (15 MG) BY MOUTH ONCE DAILY. TO TAKE WITH FOOD AND 1 GLASS OF WATER 30 tablet 1    neomycin-polymyxin-pramoxine (Neosporin) 3.5-10,000-10 mg-unit-mg/gram cream Apply topically.      tadalafil (Cialis) 10 mg tablet Take 1 tablet (10 mg) by mouth if needed for erectile dysfunction. 30 tablet 11    triamcinolone (Nasacort) 55 mcg nasal inhaler Administer into affected nostril(s).       No current facility-administered medications for this visit.   [6]    Allergies  Allergen Reactions    Sulfamethoxazole-Trimethoprim Anaphylaxis     patient states throat swells    Other Other     Mildew    Griseofulvin Itching     antifungals    Pollen Extracts Other     sneezing

## 2025-06-23 ENCOUNTER — APPOINTMENT (OUTPATIENT)
Dept: ORTHOPEDIC SURGERY | Facility: CLINIC | Age: 75
End: 2025-06-23
Payer: MEDICARE

## 2025-06-25 ENCOUNTER — APPOINTMENT (OUTPATIENT)
Dept: PRIMARY CARE | Facility: CLINIC | Age: 75
End: 2025-06-25
Payer: MEDICARE

## 2025-06-25 VITALS
HEART RATE: 72 BPM | OXYGEN SATURATION: 96 % | WEIGHT: 213.6 LBS | SYSTOLIC BLOOD PRESSURE: 130 MMHG | DIASTOLIC BLOOD PRESSURE: 80 MMHG | BODY MASS INDEX: 31.09 KG/M2

## 2025-06-25 DIAGNOSIS — E88.01 ALPHA-1-ANTITRYPSIN DEFICIENCY (MULTI): ICD-10-CM

## 2025-06-25 DIAGNOSIS — C73 MEDULLARY THYROID CARCINOMA: ICD-10-CM

## 2025-06-25 DIAGNOSIS — I10 PRIMARY HYPERTENSION: ICD-10-CM

## 2025-06-25 DIAGNOSIS — Z12.5 SCREENING FOR PROSTATE CANCER: ICD-10-CM

## 2025-06-25 DIAGNOSIS — E03.9 ACQUIRED HYPOTHYROIDISM: ICD-10-CM

## 2025-06-25 DIAGNOSIS — Z98.890 H/O TOTAL THYROIDECTOMY: ICD-10-CM

## 2025-06-25 DIAGNOSIS — E89.2 S/P SUBTOTAL PARATHYROIDECTOMY (CMS/HCC): ICD-10-CM

## 2025-06-25 DIAGNOSIS — E78.49 OTHER HYPERLIPIDEMIA: ICD-10-CM

## 2025-06-25 DIAGNOSIS — I48.0 PAROXYSMAL ATRIAL FIBRILLATION (MULTI): ICD-10-CM

## 2025-06-25 DIAGNOSIS — Z00.00 MEDICARE ANNUAL WELLNESS VISIT, SUBSEQUENT: Primary | Chronic | ICD-10-CM

## 2025-06-25 DIAGNOSIS — Z90.89 H/O TOTAL THYROIDECTOMY: ICD-10-CM

## 2025-06-25 DIAGNOSIS — Z00.00 ROUTINE GENERAL MEDICAL EXAMINATION AT HEALTH CARE FACILITY: ICD-10-CM

## 2025-06-25 PROCEDURE — 1036F TOBACCO NON-USER: CPT | Performed by: INTERNAL MEDICINE

## 2025-06-25 PROCEDURE — G0439 PPPS, SUBSEQ VISIT: HCPCS | Performed by: INTERNAL MEDICINE

## 2025-06-25 PROCEDURE — 3079F DIAST BP 80-89 MM HG: CPT | Performed by: INTERNAL MEDICINE

## 2025-06-25 PROCEDURE — 3075F SYST BP GE 130 - 139MM HG: CPT | Performed by: INTERNAL MEDICINE

## 2025-06-25 PROCEDURE — 1158F ADVNC CARE PLAN TLK DOCD: CPT | Performed by: INTERNAL MEDICINE

## 2025-06-25 PROCEDURE — 1160F RVW MEDS BY RX/DR IN RCRD: CPT | Performed by: INTERNAL MEDICINE

## 2025-06-25 PROCEDURE — 1159F MED LIST DOCD IN RCRD: CPT | Performed by: INTERNAL MEDICINE

## 2025-06-25 PROCEDURE — 1170F FXNL STATUS ASSESSED: CPT | Performed by: INTERNAL MEDICINE

## 2025-06-25 PROCEDURE — 99214 OFFICE O/P EST MOD 30 MIN: CPT | Performed by: INTERNAL MEDICINE

## 2025-06-25 ASSESSMENT — ACTIVITIES OF DAILY LIVING (ADL)
DOING_HOUSEWORK: INDEPENDENT
GROCERY_SHOPPING: INDEPENDENT
BATHING: INDEPENDENT
MANAGING_FINANCES: INDEPENDENT
TAKING_MEDICATION: INDEPENDENT
DRESSING: INDEPENDENT

## 2025-06-25 ASSESSMENT — PATIENT HEALTH QUESTIONNAIRE - PHQ9
2. FEELING DOWN, DEPRESSED OR HOPELESS: NOT AT ALL
SUM OF ALL RESPONSES TO PHQ9 QUESTIONS 1 AND 2: 0
1. LITTLE INTEREST OR PLEASURE IN DOING THINGS: NOT AT ALL

## 2025-06-25 ASSESSMENT — ENCOUNTER SYMPTOMS: CONSTITUTIONAL NEGATIVE: 1

## 2025-06-25 NOTE — PROGRESS NOTES
"Patient ID: Gaurav Fischer \"Sidney\" is a 75 y.o. male who presents for Medicare Annual Wellness Visit Subsequent and Follow-up (MEDICARE WELLNESS VISIT ).    /80   Pulse 72   Wt 96.9 kg (213 lb 9.6 oz)   SpO2 96%   BMI 31.09 kg/m²     HPI      Hypertension on medications controlled  No chest pain, no shortness of breath, no PND, no orthopnea,  No leg swelling, no palpitation, no headache        Patient was recently diagnosed with right renal mass  He has a follow-up appointment with urology Dr. De Anda  On 7/10/2025 for consultation      He had his history of medullary thyroid carcinoma  Status post total thyroidectomy      Iatrogenic acquired hypothyroidism      Also status post subtotal partial parathyroidectomy      Hyperlipidemia on medications controlled      Alpha 1 antitrypsin deficiency  Seems stable      PAROXYSMAL ATRIAL FIBRILLATION  ON ELIQUIS 5MG PO BID   AND ON ASPIRIN 81MG A DAY          He is obese  No apnea  No snoring  No gasping or choking for air at night  No daytime somnolence  NO am fatigue   No AM headache          Subjective     Review of Systems   Constitutional: Negative.    All other systems reviewed and are negative.      Objective     Physical Exam  Vitals and nursing note reviewed.   Constitutional:       Appearance: Normal appearance. He is obese.   Neck:      Vascular: No carotid bruit.   Cardiovascular:      Rate and Rhythm: Normal rate and regular rhythm.      Heart sounds: Normal heart sounds. No murmur heard.  Pulmonary:      Effort: Pulmonary effort is normal.      Breath sounds: Normal breath sounds.   Abdominal:      Palpations: There is no mass.   Musculoskeletal:      Right lower leg: No edema.      Left lower leg: No edema.   Skin:     Capillary Refill: Capillary refill takes more than 3 seconds.   Neurological:      General: No focal deficit present.      Mental Status: He is oriented to person, place, and time. Mental status is at baseline.   Psychiatric:         " Mood and Affect: Mood normal.         Behavior: Behavior normal.         Thought Content: Thought content normal.         Judgment: Judgment normal.         Lab Results   Component Value Date    WBC 6.6 02/24/2025    HGB 14.7 02/24/2025    HCT 43.4 02/24/2025    MCV 87.3 02/24/2025     02/24/2025           Problem List Items Addressed This Visit       Primary hypertension    Acquired hypothyroidism    Relevant Orders    Tsh With Reflex To Free T4 If Abnormal    Other hyperlipidemia    Alpha-1-antitrypsin deficiency (Multi)    stable         H/O total thyroidectomy    Relevant Orders    Tsh With Reflex To Free T4 If Abnormal    Medullary thyroid carcinoma    Stable pt see endocrinology          Relevant Orders    Calcitonin     Other Visit Diagnoses         Medicare annual wellness visit, subsequent  (Chronic)   -  Primary      Routine general medical examination at health care facility        Relevant Orders    1 Year Follow Up In [facility] - PCP - Wellness Exam      S/P subtotal parathyroidectomy (CMS/[facility])          Screening for prostate cancer        Relevant Orders    Prostate Spec.Ag,Screen                A/P         Will get calcitonin, TSH, PSA\  Discussed with the patient the effect of morbid obesity and overall all-cause mortality  Discussed with the patient the effect of morbid obesity and physical mental wellbeing  Discussed with the patient the effect of morbid obesity and cardiovascular cerebrovascular health  Discussed with the patient the effect of morbid obesity and hypertension, diabetes, obstructive sleep apnea fatal arrhythmias and sudden death  Discussed with the patient the effect of morbid obesity and chronic kidney health, chronic pain, depression, cancer/malignancy  Advised him to cut back total calories intake and total portion size          Advance Care Planning Note     Discussion Date: 06/25/25   Discussion Participants: patient    The patient wishes to discuss Advance  Care Planning today and the following is a brief summary of our discussion.     Patient has capacity to make their own medical decisions: Yes  Health Care Agent/Surrogate Decision Maker documented in chart: Yes    Documents on file and valid:  Advance Directive/Living Will: No   Health Care Power of : Yes  Other:     Communication of Medical Status/Prognosis:        Communication of Treatment Goals/Options:       Discussed with the patient end-of-life choices patient is presently full code he does not have a living will or healthcare power of  he will think about getting it done when ready will bring it on next office visit so that it can be scanned into the chart.  Documentation    Treatment Decisions  Goals of Care: survival is paramount regardless of prognosis, treatment outcome, or burden       Follow Up Plan      Team Members      Time Statement: Total face to face time spent on advance care planning was 5 minutes with 5 minutes spent in counseling, including the explanation.    Renetta Barnett MD  6/25/2025 12:57 PM

## 2025-06-26 LAB
CALCIT SERPL-MCNC: NORMAL NG/L
PSA SERPL-MCNC: 0.53 NG/ML
TSH SERPL-ACNC: 1.62 MIU/L (ref 0.4–4.5)

## 2025-06-27 ENCOUNTER — APPOINTMENT (OUTPATIENT)
Dept: ORTHOPEDIC SURGERY | Facility: CLINIC | Age: 75
End: 2025-06-27
Payer: MEDICARE

## 2025-06-30 ENCOUNTER — DOCUMENTATION (OUTPATIENT)
Dept: CARDIOLOGY | Facility: CLINIC | Age: 75
End: 2025-06-30
Payer: MEDICARE

## 2025-06-30 LAB
CALCIT SERPL-MCNC: <2 PG/ML
PSA SERPL-MCNC: 0.53 NG/ML
TSH SERPL-ACNC: 1.62 MIU/L (ref 0.4–4.5)

## 2025-07-03 ENCOUNTER — EDUCATION (OUTPATIENT)
Dept: ORTHOPEDIC SURGERY | Facility: CLINIC | Age: 75
End: 2025-07-03
Payer: MEDICARE

## 2025-07-03 ENCOUNTER — APPOINTMENT (OUTPATIENT)
Facility: CLINIC | Age: 75
End: 2025-07-03
Payer: MEDICARE

## 2025-07-03 PROCEDURE — E0143 WALKER FOLDING WHEELED W/O S: HCPCS | Performed by: STUDENT IN AN ORGANIZED HEALTH CARE EDUCATION/TRAINING PROGRAM

## 2025-07-03 PROCEDURE — E0218 FLUID CIRC COLD PAD W PUMP: HCPCS | Performed by: STUDENT IN AN ORGANIZED HEALTH CARE EDUCATION/TRAINING PROGRAM

## 2025-07-03 NOTE — GROUP NOTE
In addition to the group class activities, Sidney Fischer had the following lab work completed:  No orders of the defined types were placed in this encounter.    Gaurav Fischer  attended joint class on 7/3 and Brought a care partner to the class. The preop survey was completed and the patient provided attestation that they understand the content reviewed and that they do have a care partner identified to assist with recovery. Patient was provided with a folder of materials and instructed to call orthopedic navigator with questions.   A new History and Physical was not completed.    This class lasted approximately 2 hours and had 10 participants.   Thank you for attending our Joint Replacement class today in preparation for your upcoming surgery.  Topics discussed include:    MyChart Enrollment  Communication with Care Team  My Chart is the best form of communication to reach all of your caregivers  You can send messages to specific care givers, or a care team  Continued Education  You will be enrolled in a Total Joint Replacement care plan to receive additional education before and after surgery  You can review a short recording of the class content  Access to Medical Records  You can access test results, office notes, appointments, etc.  You can connect to other healthcare systems who use [facility] ([facility], [facility], [facility], etc.)  [id number]  Program Information  Using Meds to Beds is our standard process for joint replacements at [facility] to minimize issues with homegoing medications. Please let us know ahead of time if there is a reason why Meds to Beds cannot be used    Background/Understanding of Joint Replacement Surgery  Potential for same day discharge  Any questions or concerns about your specific surgery/plan are to be directed to the surgeon's office    How to Prepare for Surgery  Use of Nicotine Products/Smoking  Stop several weeks before surgery  Such products slow down the healing process and increase  risk of post-op infection and complications  Clearance for Surgery  Medical Clearance by Specialists  Dental Clearance  Cracked/Broken/Loose teeth left untreated may postpone surgery  The importance of post-op antibiotics for dental visits per surgeon protocol  Preadmission Testing  **Potential for postponed surgery if appropriate clearance is not obtained  Medication Instruction  Follow instructions provided by the doctor who prescribes your medication (typically, but not limited to cardiologist)  Preadmission testing will provide additional instructions during your appointment on what to stop and what to take as you get closer to surgery  For clarification of these instructions, please call preadmission testing directly - [phone]  Tips for Preparing the home for discharge from the hospital  Care Partner  Requirement for surgery, the patient must have a plan to have help at home  Potential for postponed surgery if plan for home support cannot be established  How the care partner can help after surgery  CHG Body Wash/Mouth Wash  Follow the instructions given at preadmission testing  Body wash is to be used on the body and hair for 5 washes  Mouthwash is to be used the night before and morning of surgery  **This is a system-wide protocol developed by infectious disease professionals, we will not alter our recommendations for those with sensitive skin or those who have special hair needs.  Please follow the instructions as they are written as this will provide the best infection prevention measures for surgery.  Should you have an allergy to one of the products, please discuss with your preadmission team**    What to Expect in the Hospital/At Home  Morning of Surgery NPO Guidelines  Nothing to eat after midnight  Water can be consumed up to 2 hours prior to arrival  Surgical and Post-Surgical Care Team  Surgical Team  Anesthesia Team  Nursing  Physical Therapy  Care Coordinating  Pharmacy  Hospital Arrival  Instructions  Arrive at the time provided to you  Consider traffic patterns (rush-hour) based on arrival time  Have arrangements made for a ride home  If discharging same day, care partner should remain at the hospital  Recovering after Surgery  Recovery Room - Visitors are not brought back  Transition to hospital room - 2nd Floor, Visitors will be directed to your room  The presence of and strategies for controlling surgical pain and swelling  The importance of early mobility  Side effects after surgery  What to expect if staying overnight    Discharge Planning  The intended plan for discharge will be for patients to discharge home  All patients require a care partner (family, friend, neighbor, etc.) to stay with the patient for the first few nights after surgery  The inability to secure help at home will postpone surgery  Home Care Services set up per surgeon order  Physical Therapy  Occupational Therapy  **If desired, private duty care can be arranged by the patient ahead of time**  Outpatient Physical Therapy per surgeon order    Recovering at Home  Wound Care  Follow wound care instructions found in your discharge paperwork  Bandage is water-resistant and you may shower with the bandage  Do not scrub directly over the bandage  Do not submerge in water until cleared (bathtub, hot tub, pool, etc.)    Post-Op Risk Prevention  Infection Prevention  Promptly seek treatment for any infections post-operatively  Routine dental visits must be postponed for 3 months after surgery  Your surgeon may require antibiotics prior to future dental visits  Any concerns for infection not related directly to the knee or the hip should be managed by your primary care provider  Blood Clots  Be sure to complete the course of blood thinning medication as prescribed by your surgeon  Movement every 1-2 hours during the day is encouraged to prevent blood clots  Monitor for signs of blood clots  Wear compression stockings as prescribed by  your surgeon  Constipation  Constipation is common following surgery  Drink plenty of fluids  Take stool softener/laxative as prescribed by your surgeon  Move around frequently  Eat foods high in fiber  Fall Prevention  Prepare home ahead of time to clear space to move with walker  Remove throw rugs and electrical cords from walkways  Install railings near any stairways with more than 2 steps  Use night lights for increased visibility at night  Continue to use your assistive device until cleared by surgeon or physical therapy  Dislocation Prevention - Not all procedures will have dislocation precautions  Follow dislocation precautions provided by your surgeon  It is OK to resume sexual activity about 6 weeks following surgery  Be sure to follow any dislocation precautions assigned    Durable Medical Equipment  Cold Therapy  Breg Cold Therapy Machines  Ice/Gel Packs  Assistive Devices  Folding Walker with Wheels (in the front only)  No Rollators  Crutches if approved by Physical Therapy and Surgeon after surgery  Hip Kits  Raised Toilet Seats  Additional Compression Stockings    Joint Preservation  Healthy Activities when Cleared  Walking  Swimming  Bike Riding  Activities to Avoid  Refrain from repetitive motions which have a high impact on the joint  Gradual Progression  Progress activity slowly, listen to your body  Common Findings - NORMAL after surgery  Clicking/Grinding  Numbness near incision    Physical Therapy  Prehabilitation exercises  START TODAY ON BOTH LEGS  Surgery Specific Precautions  Follow surgery specific precautions found in your discharge paperwork    Follow-Up Visit  All patients will see their surgeon for a follow up visit after surgery  The visit may range from 2-6 weeks after surgery and is surgeon specific      Please don't hesitate to reach out if you have any additional questions or concerns.    Amaury Ye BSN, RN  Susana Starks BSN, RN-BC  Orthopedic Nurses  [facility]    [phone]

## 2025-07-07 ENCOUNTER — APPOINTMENT (OUTPATIENT)
Dept: UROLOGY | Facility: CLINIC | Age: 75
End: 2025-07-07
Payer: MEDICARE

## 2025-07-08 NOTE — PROGRESS NOTES
Jazzy Barber MD   Adult Reconstruction and Joint Replacement Surgery  Phone: [phone]     Fax: [phone]       Name: Gaurav Fischer  Age: 75 y.o.   : 1950   Date of Visit: 2025    PREOPERATIVE CONSULTATION    CC: Left hip pain    HPI:  This patient presents for discussion of upcoming LEFT total HIP arthroplasty.     The patient reports 1 years of LEFT hip pain.     The patient has tried at least 3 months of conservative treatments and continues to have disabling pain, impaired activities of daily living and worsened quality of life. They rate their pain and/or debilitation as severe at times.     Patient has tried the following Activity modification, Tylenol (arthritis dosing) , Physical therapy, Corticosteroid injections , and Xray. Date of last steroid injection: 25. Patient does have pain at night. The patient does not report falls related to this problem.  Patient is able to walk 2-3 blocks. Patient is currently using nothing as assistive device. Primarily complains of groin, buttock, and lateral hip pain. Patient has difficulty with donning and doffing shoes and socks, stairs, and getting in/out of car . The pain is significantly impacting their ability to perform activities of daily living. Patient reports no longer able to do activities such as walk longer distances.      Focused History  PMH: Reviewed and PE/DVT: no.  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   PSH: Reviewed , Hip/Knee replacement: no, Hip/Knee surgery: no, Anesthesia complications: no, Spine surgery: lumbar discectomy, 25 years ago roughly, Surgical infection: no, and Weight loss surgery: no  SHx: Reviewed, Occupation: retired , Current smoker: no, EtOH intake weekly: 1 bottle of beer on weekend, Social support: sons but not driving age, and Preferred physical activities: pool tournaments  Jehovah´s Witness: no  Meds: Reviewed, Current Anticoagulants:  no, Weight loss medication: no, and Current Opioids: no  Allergies: Reviewed  and The patient reports no contraindications or allergies to cephalosporins, aspirin, NSAIDs or opioids, except as noted above.  Dental Hx: Last routine cleaning: had tooth surgery 2 months ago, no active issues and All invasive dental work must be completed 3+ months prior to joint replacement surgery. Dental cleaning must be completed 6+ weeks prior to joint replacement surgery. Patient are to avoid any invasive dental work 3-6 months post-surgically.   FH: No family history of any bleeding or clotting disorders.    PROMs/HISTORY  PROMs   Hoos Jr-Hip Disability And Osteoarthritis Outcome Score For Joint Replacement    6/24/2025  5:04 PM EDT - Filed by Patient   Instructions    What amount of hip pain have you experienced the last week during the following activities?   Going up or down stairs Moderate   Walking on an uneven surface Moderate   The following questions concern your physical function. By this we mean your ability to move around and to look after yourself. For each of the following activities please indicate the degree of difficulty you have experienced in the last week due to your hip.   Rising from sitting Severe   Bending to floor/ an object Extreme   Lying in bed (turning over, maintaining hip position) Moderate   Sitting Moderate   Hoos Jr Scoring (range: 0 - 100) 43.34     [facility] 12 Item Health Survey (Vr-12)    6/24/2025  5:10 PM EDT - Filed by Patient   In general, would you say your health is: Poor   The following questions are about activities you might do during a typical day. Does your health now limit you in these activities?  If so, how much?   Moderate activities, such as moving a table, pushing a vacuum , bowling, or playing golf? Yes, Limited A Lot   Climbing several flights of stairs? Yes, Limited A Lot   During the past 4 weeks, have you had any of the following problems with your  work or other regular daily activities as a result of your physical health?   Accomplished less than you would like. Yes, All Of The Time   Were limited in the kind of work or other activities. Yes, All Of The Time   During the past 4 weeks, have you had any of the following problems with your work or other regular daily activities as a result of any emotional problems (such as feeling depressed or anxious)?   Accomplished less than you would like Yes, All Of The Time   Didn't do work or other activities as carefully as usual Yes, All Of The Time   During the past 4 weeks, how much did pain interfere with your normal work (including both work outside the home and house work)? Extremely   How much of the time during the past 4 weeks:   Have you felt calm and peaceful? A Little Of The Time   Did you have a lot of energy? None Of The Time   Have you felt downhearted and blue? A Little Of The Time   During the past 4 weeks, how much of the time has your physical health or emotional problems interfered with your social activities (like visiting with friends, relatives, etc.)? Most Of The Time   Compared to one year ago, how would you rate your physical health in general now? Much Worse   Compared to one year ago, how would you rate your emotional problems (such as feeling anxious, depressed or irritable) now? Slightly Worse   Ort Vr-12 Question Pcs (range: 0 - 100) 16.04   Ort Vr-12 Question McS (range: 0 - 100) 29.51          Medical History[1]    Medical History[2]  Documented in chart and reviewed.     Surgical History[3]    Allergies: He is allergic to sulfamethoxazole-trimethoprim, other, griseofulvin, and pollen extracts.     Medications:  Current Outpatient Medications   Medication Instructions    amLODIPine (NORVASC) 5 mg, oral, Daily    apixaban (ELIQUIS) 5 mg, oral, 2 times daily    aspirin 81 mg tablet 1 tablet, Daily    azelastine (Astelin) 137 mcg (0.1 %) nasal spray Administer into affected nostril(s).     cetirizine (ZYRTEC) 10 mg, As needed    desoximetasone (Topicort) 0.25 % cream Desoximetasone 0.25 % External Cream APPLY AND GENTLY MASSAGE INTO AFFECTED AREA(S) TWICE DAILY. as needed Refills: 0 Start : 26-Oct-2016 Active    famotidine (PEPCID) 20 mg, Daily    ferrous sulfate (Slow Fe) 137 mg (45 mg iron) tablet extended release 1 tablet, Daily    hydrocortisone 1 % ointment As needed    levothyroxine (SYNTHROID, LEVOXYL) 112 mcg, oral, Every morning, Take on empty stomach    lovastatin (MEVACOR) 40 mg, oral, Nightly    MELATONIN ORAL 1 mg    meloxicam (MOBIC) 15 mg, oral, Daily, To take with food and 1 glass of water    neomycin-polymyxin-pramoxine (Neosporin) 3.5-10,000-10 mg-unit-mg/gram cream Apply topically.    tadalafil (CIALIS) 10 mg, oral, As needed    triamcinolone (Nasacort) 55 mcg nasal inhaler Administer into affected nostril(s).       Family History[4]  Documented in chart and reviewed.     Social History     Tobacco Use    Smoking status: Former     Current packs/day: 0.00     Average packs/day: 0.3 packs/day for 0.6 years (0.2 ttl pk-yrs)     Types: Cigars, Cigarettes     Start date: 1971     Quit date: 1971     Years since quittin.6    Smokeless tobacco: Never   Substance Use Topics    Alcohol use: Yes     Alcohol/week: 2.0 standard drinks of alcohol     Types: 2 Cans of beer per week     Comment: 1 can per day usually on Tuesday and on Saturday        Review of Systems: Review of systems completed with medical assistant intake. Please refer to this note.     Physical Exam:  BMI: 31.    General: The patient is well appearing and has an appropriate affect.      Neurological Examination: SILT in SPN/DPN/Sural/Saphenous/Tibial nerves. 5/5 EHL, FHL, Tibial anterior, Gastrocnemius. Coordination grossly intact.      Cardiovascular Exam: Capillary refill <2 seconds.      Lymphatic Examination: There is no obvious lymphatic swelling present around the involved joint.     Skin Exam: Skin  around the pertinent joint is without evidence of infection or rash.     Gait: The patient ambulates with a coxalgic gait.      Lumbar spine:     No tenderness to palpation midline.     Negative straight leg raise bilaterally.     Left Hip Examination:     Examination of the hip reveals the skin to be intact.     There is mild tenderness over the greater trochanter.     There is no obvious swelling.     There is a positive Stinchfield test.     Range of motion is: full extension to 95 degrees of flexion.     The hip internally rotates to 10 degrees and externally rotates to 35 degrees.     Abduction is 35 degrees and adduction is 10 degrees.     There is groin and buttock pain with hip motion.     Abductor strength 4/5.     Right Hip Examination:     Examination of the hip reveals the skin to be intact.     There is no tenderness over the greater trochanter.     There is no obvious swelling.     There is a negative Stinchfield test.     Range of motion is full extension to 100 degrees of flexion.     The hip internally rotates to 20 and externally rotates 40 degrees.     Abduction is 50 degrees and adduction is 20 degrees.     There is no groin and buttock pain with hip motion.     Abductor strength 4/5.     Right Knee Examination:  Examination of the right knee reveals the skin to be intact. There is no obvious swelling.     There is no tenderness to palpation.     Range of motion is full extension to 120 degrees of flexion.     The knee is stable.     There is no grinding with range of motion.     There is no patellofemoral crepitus.     Left Knee Examination:  Examination of the left knee reveals the skin to be intact. There is no obvious swelling.     There is no tenderness to palpation.     Range of motion is full extension to 120 degrees of flexion.     The knee is stable.     There is no grinding with range of motion.     There is no patellofemoral crepitus.    Radiographs:  Radiographs were personally  reviewed today with the patient. There is evidence of severe LEFT hip osteoarthritis with bone on bone apposition.    Impression:  This patient presents with severe LEFT hip osteoarthritis with bone on bone apposition. This is now affecting activities of daily living. All appropriate nonsurgical management options have been tried and failed.    Diagnosis:   Primary osteoarthritis of left hip     Recommendations / Plan:    I have discussed the options in detail with the patient. We have discussed anti-inflammatory medication, activity modification, physical therapy, corticosteroid injections, and total hip replacement surgery.    The risks and benefits of all these treatment options have been discussed in detail. The patient has tried at least 3 months of the above conservative treatments and continues to have disabling pain, impaired activities of daily living and worsened quality of life. The patient is a candidate for LEFT total hip replacement. We discussed anterior and posterolateral surgical approaches to the hip joint with my rationale for anterior approach. Risks and benefits of all approaches were reviewed. We discussed expected rehabilitation, DVT prophylaxis using Aspirin, time points during recovery, likely functional outcomes and long-term issues with hip replacement procedures.    We discussed the hospital stay, postoperative rehab and follow up required as well as the potential risks of surgery including bleeding, infection, need for reoperation, failure of the operation to provide symptomatic relief, leg length discrepancy, need for multiple revision surgeries in the setting of bleeding, wear, infection, instability, dislocation requiring closed and/or open reduction and/or revision, damage to major neurovascular structures, venous thrombolic disease, complications of regional and/or general anesthesia, as well as death. The patient also understands that a good result is not guaranteed and that poor  outcomes can at times be unavoidable. The patient may also have persistent and chronic pain that could require further intervention. The patient confirms their understanding of the risks as well as the benefits of surgery.     We have reviewed the typical recovery after surgery and have discussed the fact that full recovery can take up to 1 year. Bearing surfaces were discussed in detail. The risks and benefits of all available bearing surfaces: metal on poly, Oxinium on poly, and dual mobility were discussed. Specifically, the risks of long-term wear and osteolysis were discussed. We also discussed the potential for metal hypersensitivity reactions that could potentially require further surgery.  For anterior approach surgery, I specifically discussed the increased risk for intra-operative fracture, the risk of aseptic femoral failure, and the risk for lateral femoral cutaneous nerve injury.    The patient does not have any of the following contraindications to arthroplasty including active infection of the hip joint, systemic bacteremia, active skin infection or an open wound at the surgical site, neuropathic arthritis or severe, rapidly progressive neurological disease.     Patient will consider proceeding with LEFT JULISA. All questions were answered today. If the patient chooses to move forward with surgical scheduling, they will be enrolled in a preoperative arthroplasty teaching class and the pre-admission testing pathway. The patient was encouraged to contact their primary care physician to discuss fitness for surgery.     Social support after surgery: significant other  Pain medication plan: Standard (Acetominophen, Meloxicam, Oxycodone, Tramadol)   Additional preoperative considerations: None    Diagnosis: M16.12 - LEFT hip osteoarthritis   Procedure: 20936 - Total HIP Arthroplasty (JULISA) - ANTERIOR  Surgery Location: [facility]   Equipment Requests: JULISA - ANTERIOR: Fertile table accessories, Midas Ashkan Finger Control  with AS14 attachment, Cylindrical silvia ([id number]) and DEPUY: Emphasys, Actis  Anesthesia: JULISA: Regional - Spinal   Discharge: Overnight    _____________  Jazzy Barber MD   Attending Orthopaedic Surgeon  [facility]    [facility]       This office note was transcribed with dictation software.  Please excuse any typographical errors, program misunderstandings leading to inadvertent insertions or deletions of inappropriate wording, pronoun errors and other unintentional transcription errors not noticed on proof-reading.                              [1]   Past Medical History:  Diagnosis Date    Allergic     Anemia     Back to normal?    Arthritis June 2024    Left hip    Atrial fibrillation (Multi) April 2025    Benign prostatic hyperplasia     Cancer (Multi)     Chronic kidney disease June 2025    5 cm mass in kidney    COVID-19     CPAP (continuous positive airway pressure) dependence     Dependence on other enabling machines and devices     History of continuous positive airway pressure (CPAP) therapy at home    Disease of thyroid gland     Encounter for general adult medical examination without abnormal findings 07/12/2019    Welcome to Medicare preventive visit    Erectile dysfunction     Fractures 1967    Collar bone    GERD (gastroesophageal reflux disease)     Hernia, internal     Abdominal    Hip pain, acute, right     Hypertension     Low back pain     Numbness September 2025    Obesity     Peripheral neuropathy September 2025    Personal history of other endocrine, nutritional and metabolic disease 03/03/2015    History of type 2 multiple endocrine neoplasia (MEN)    Sleep apnea     Urinary incontinence     Urinary tract infection    [2]   Past Medical History:  Diagnosis Date    Allergic     Anemia     Back to normal?    Arthritis June 2024    Left hip    Atrial fibrillation (Multi) April 2025    Benign prostatic hyperplasia     Cancer (Multi)      Chronic kidney disease June 2025    5 cm mass in kidney    COVID-19     CPAP (continuous positive airway pressure) dependence     Dependence on other enabling machines and devices     History of continuous positive airway pressure (CPAP) therapy at home    Disease of thyroid gland     Encounter for general adult medical examination without abnormal findings 07/12/2019    Welcome to Medicare preventive visit    Erectile dysfunction     Fractures 1967    Collar bone    GERD (gastroesophageal reflux disease)     Hernia, internal     Abdominal    Hip pain, acute, right     Hypertension     Low back pain     Numbness September 2025    Obesity     Peripheral neuropathy September 2025    Personal history of other endocrine, nutritional and metabolic disease 03/03/2015    History of type 2 multiple endocrine neoplasia (MEN)    Sleep apnea     Urinary incontinence     Urinary tract infection    [3]   Past Surgical History:  Procedure Laterality Date    APPENDECTOMY  03/03/2015    Appendectomy    BACK SURGERY      CIRCUMCISION, PRIMARY      COLONOSCOPY  03/03/2015    Complete Colonoscopy    FLEXIBLE SIGMOIDOSCOPY      HERNIA REPAIR  03/03/2015    Hernia Repair    LUMBAR LAMINECTOMY  2002    OTHER SURGICAL HISTORY  02/17/2017    Laminectomy Decompressive Up To Two Lumbar Segments    OTHER SURGICAL HISTORY  04/08/2021    Back surgery    PROSTATE SURGERY  2024    HoLEP    SINUS SURGERY  03/03/2015    Sinus Surgery    THYROID SURGERY      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

## 2025-07-09 ENCOUNTER — HOSPITAL ENCOUNTER (OUTPATIENT)
Dept: RADIOLOGY | Facility: HOSPITAL | Age: 75
Discharge: HOME | End: 2025-07-09
Payer: MEDICARE

## 2025-07-09 ENCOUNTER — OFFICE VISIT (OUTPATIENT)
Dept: ORTHOPEDIC SURGERY | Facility: HOSPITAL | Age: 75
End: 2025-07-09
Payer: MEDICARE

## 2025-07-09 DIAGNOSIS — M16.12 PRIMARY OSTEOARTHRITIS OF LEFT HIP: ICD-10-CM

## 2025-07-09 DIAGNOSIS — M16.12 PRIMARY OSTEOARTHRITIS OF LEFT HIP: Primary | ICD-10-CM

## 2025-07-09 PROCEDURE — 72170 X-RAY EXAM OF PELVIS: CPT

## 2025-07-09 PROCEDURE — 99212 OFFICE O/P EST SF 10 MIN: CPT | Performed by: STUDENT IN AN ORGANIZED HEALTH CARE EDUCATION/TRAINING PROGRAM

## 2025-07-09 PROCEDURE — 72170 X-RAY EXAM OF PELVIS: CPT | Performed by: RADIOLOGY

## 2025-07-09 PROCEDURE — 77073 BONE LENGTH STUDIES: CPT | Performed by: RADIOLOGY

## 2025-07-09 PROCEDURE — 1159F MED LIST DOCD IN RCRD: CPT | Performed by: STUDENT IN AN ORGANIZED HEALTH CARE EDUCATION/TRAINING PROGRAM

## 2025-07-09 PROCEDURE — 77073 BONE LENGTH STUDIES: CPT

## 2025-07-09 PROCEDURE — 99214 OFFICE O/P EST MOD 30 MIN: CPT | Performed by: STUDENT IN AN ORGANIZED HEALTH CARE EDUCATION/TRAINING PROGRAM

## 2025-07-09 ASSESSMENT — PAIN SCALES - GENERAL: PAINLEVEL_OUTOF10: 5 - MODERATE PAIN

## 2025-07-09 ASSESSMENT — PAIN DESCRIPTION - DESCRIPTORS: DESCRIPTORS: SHARP

## 2025-07-09 ASSESSMENT — PAIN - FUNCTIONAL ASSESSMENT: PAIN_FUNCTIONAL_ASSESSMENT: 0-10

## 2025-07-10 ENCOUNTER — APPOINTMENT (OUTPATIENT)
Dept: UROLOGY | Facility: CLINIC | Age: 75
End: 2025-07-10
Payer: MEDICARE

## 2025-07-10 VITALS — BODY MASS INDEX: 30.55 KG/M2 | TEMPERATURE: 97.6 F | HEIGHT: 70 IN | WEIGHT: 213.4 LBS

## 2025-07-10 DIAGNOSIS — N28.89 RENAL MASS: Primary | ICD-10-CM

## 2025-07-10 PROCEDURE — 99205 OFFICE O/P NEW HI 60 MIN: CPT | Performed by: STUDENT IN AN ORGANIZED HEALTH CARE EDUCATION/TRAINING PROGRAM

## 2025-07-10 PROCEDURE — 3008F BODY MASS INDEX DOCD: CPT | Performed by: STUDENT IN AN ORGANIZED HEALTH CARE EDUCATION/TRAINING PROGRAM

## 2025-07-10 PROCEDURE — 1159F MED LIST DOCD IN RCRD: CPT | Performed by: STUDENT IN AN ORGANIZED HEALTH CARE EDUCATION/TRAINING PROGRAM

## 2025-07-10 PROCEDURE — 1125F AMNT PAIN NOTED PAIN PRSNT: CPT | Performed by: STUDENT IN AN ORGANIZED HEALTH CARE EDUCATION/TRAINING PROGRAM

## 2025-07-10 RX ORDER — CEFAZOLIN SODIUM 2 G/100ML
2 INJECTION, SOLUTION INTRAVENOUS ONCE
Status: CANCELLED | OUTPATIENT
Start: 2025-07-10 | End: 2025-07-10

## 2025-07-10 ASSESSMENT — PAIN SCALES - GENERAL: PAINLEVEL_OUTOF10: 2

## 2025-07-10 NOTE — PROGRESS NOTES
"HPI:    Gaurav Fischer \"Sidney\" is a 75 y.o. male referred by Dr. Tejada for 5 cm renal mass. Hx of BPH w/ LUTS, ED, HTN, HLD, GERD, DEANGELO, Afib, anemia. MRI Kidney (6/15/25) showed 5 cm right midpole exophytic solid-cystic renal mass versus solid mass with central necrotic core, no vascular or rosemary involvement. Denies hematuria. Doing well.     Non-smoker   SHx: 2x umbilical hernia repair (laparoscopically)  No FHx of kidney cancer.    Cre: 1.00 (2/24/25)  PSA: 0.53 (6/25/25)    MRI Kidney (6/15/25): 5 cm right midpole exophytic solid-cystic renal mass versus solid mass with central necrotic core, no vascular or rosemary involvement.    Review of Systems:  All systems reviewed. Anything negative noted in the HPI.    Physical Exam:  Vitals signs reviewed.  Constitutional:      Appearance: Well-developed.  HENT:     Head: Normocephalic and atraumatic.  Neck:     Musculoskeletal: Normal range of motion.  Pulmonary:     Effort: Pulmonary effort is normal.  Musculoskeletal: Normal range of motion.  Skin:     General: Skin is warm and dry.  Neurological:     Mental Status: Alert and oriented to person, place, and time.  Psychiatric:        Behavior: Behavior normal.        Thought Content: Thought content normal.        Judgment: Judgment normal.  Genitourinary:     Penis: Normal.      Scrotum/Testes: Normal.     Comments:  Abdominal:     Palpations: Abdomen is soft. There is no mass.     Tenderness: There is no tenderness. There is no guarding or rebound.     Hernia: No hernia is present.     Comments:     Procedures:    Assessment/Plan   Gaurav Fischer \"Sidney\" is a 75 y.o. male referred by Dr. Tejada for 5 cm renal mass. Hx of BPH w/ LUTS, ED, HTN, HLD, GERD, DEANGELO, Afib, anemia. MRI Kidney (6/15/25) showed 5 cm right midpole exophytic solid-cystic renal mass versus solid mass with central necrotic core, no vascular or rosemary involvement. Denies hematuria. Doing well. Management options including risks, benefits and " alternatives discussed at length and all questions answered. Patient prefers to proceed with CT chest and open partial right nephrectomy.           Scribe Attestation:  By signing my name below, Bernie PEREIRA Scribe   attest that this documentation has been prepared under the direction and in the presence of Milton De Anda MD.

## 2025-07-14 DIAGNOSIS — N28.89 RENAL MASS: Primary | ICD-10-CM

## 2025-07-14 DIAGNOSIS — N28.89 KIDNEY MASS: ICD-10-CM

## 2025-07-14 RX ORDER — CEFAZOLIN SODIUM 2 G/100ML
2 INJECTION, SOLUTION INTRAVENOUS ONCE
Status: CANCELLED | OUTPATIENT
Start: 2025-07-14 | End: 2025-07-14

## 2025-07-16 ENCOUNTER — APPOINTMENT (OUTPATIENT)
Dept: ORTHOPEDIC SURGERY | Facility: CLINIC | Age: 75
End: 2025-07-16
Payer: MEDICARE

## 2025-07-21 PROBLEM — N28.89 KIDNEY MASS: Status: ACTIVE | Noted: 2025-07-14

## 2025-07-22 ENCOUNTER — TELEPHONE (OUTPATIENT)
Dept: ORTHOPEDIC SURGERY | Facility: HOSPITAL | Age: 75
End: 2025-07-22

## 2025-07-22 ENCOUNTER — CLINICAL SUPPORT (OUTPATIENT)
Dept: PREADMISSION TESTING | Facility: HOSPITAL | Age: 75
End: 2025-07-22
Payer: MEDICARE

## 2025-07-22 NOTE — TELEPHONE ENCOUNTER
Called patient to discuss upcoming surgery. Confirmed that the plan is for an overnight hospital stay. The patient has obtained their medical equipment. The patient does have a care partner to assist them at home postoperatively. They live in a condo.  The patient does have stairs required for navigating the home. The patient currently does not use an assistive device. Reminded patient to follow PAT instructions leading up to surgery and to watch for a phone call from preop the day prior to their surgery to receive details about arrival time. All questions answered at this time.

## 2025-07-22 NOTE — CPM/PAT NURSE NOTE
"[facility]/PAT Nurse Note      Name: Gaurav Fischer (Gaurav Fischer \"Sidney\")  /Age: 1950/75 y.o.       Medical History[1]    Surgical History[2]    Patient  reports being sexually active and has had partner(s) who are female. He reports using the following method of birth control/protection: None.    Family History[3]    Allergies[4]    Prior to Admission medications   Medication Sig Start Date End Date Taking? Authorizing Provider   amLODIPine (Norvasc) 5 mg tablet Take 1 tablet (5 mg) by mouth once daily. 3/31/25   Renetta Barnett MD   apixaban (Eliquis) 5 mg tablet Take 1 tablet (5 mg) by mouth 2 times a day. 25  Quincy Benavides DO   aspirin 81 mg tablet Take 1 tablet by mouth once daily.    Historical Provider, MD   azelastine (Astelin) 137 mcg (0.1 %) nasal spray Administer into affected nostril(s).    Historical Provider, MD   cetirizine (ZyrTEC) 10 mg tablet Take 1 tablet (10 mg) by mouth if needed. 3/3/15   Historical Provider, MD   desoximetasone (Topicort) 0.25 % cream Desoximetasone 0.25 % External Cream APPLY AND GENTLY MASSAGE INTO AFFECTED AREA(S) TWICE DAILY. as needed Refills: 0 Start : 26-Oct-2016 Active 10/26/16   Historical Provider, MD   famotidine (Pepcid) 20 mg tablet Take 1 tablet (20 mg) by mouth once daily.    Historical Provider, MD   ferrous sulfate (Slow Fe) 137 mg (45 mg iron) tablet extended release Take 1 tablet (45 mg of elemental iron) by mouth once daily.    Historical Provider, MD   hydrocortisone 1 % ointment Apply topically if needed.    Historical Provider, MD   levothyroxine (Synthroid, Levoxyl) 112 mcg tablet TAKE 1 TABLET (112 MCG) BY MOUTH ONCE DAILY IN THE MORNING. TAKE ON EMPTY STOMACH 24   Renetta Barnett MD   lovastatin (Mevacor) 40 mg tablet Take 1 tablet (40 mg) by mouth once daily at bedtime. 25   Renetta Barnett MD   MELATONIN ORAL Take 1 mg by mouth. Take as directed    Historical Provider, MD   meloxicam (Mobic) 15 mg tablet " TAKE 1 TABLET (15 MG) BY MOUTH ONCE DAILY. TO TAKE WITH FOOD AND 1 GLASS OF WATER 1/29/25 1/29/26  Renetta Barnett MD   neomycin-polymyxin-pramoxine (Neosporin) 3.5-10,000-10 mg-unit-mg/gram cream Apply topically.    Historical Provider, MD   tadalafil (Cialis) 10 mg tablet Take 1 tablet (10 mg) by mouth if needed for erectile dysfunction. 5/21/24 5/22/25  Faby Tejada MD   triamcinolone (Nasacort) 55 mcg nasal inhaler Administer into affected nostril(s).    Historical Provider, MD BLADIMIR RUDOLPH     DASI Risk Score      Flowsheet Row Questionnaire Series Submission from 6/24/2025 in [facility] OR with Generic Provider Mycjose Pre-Admission Testing from 8/12/2024 in [facility]   Can you take care of yourself (eat, dress, bathe, or use toilet)?  2.75  filed at 06/24/2025 2150 2.75 filed at 08/12/2024 1302   Can you walk indoors, such as around your house? 1.75  filed at 06/24/2025 2150 1.75 filed at 08/12/2024 1302   Can you walk a block or two on level ground?  2.75  filed at 06/24/2025 2150 2.75 filed at 08/12/2024 1302   Can you climb a flight of stairs or walk up a hill? 5.5  filed at 06/24/2025 2150 5.5 filed at 08/12/2024 1302   Can you run a short distance? 0  filed at 06/24/2025 2150 8 filed at 08/12/2024 1302   Can you do light work around the house like dusting or washing dishes? 2.7  filed at 06/24/2025 2150 2.7 filed at 08/12/2024 1302   Can you do moderate work around the house like vacuuming, sweeping floors or carrying groceries? 3.5  filed at 06/24/2025 2150 3.5 filed at 08/12/2024 1302   Can you do heavy work around the house like scrubbing floors or lifting and moving heavy furniture?  0  filed at 06/24/2025 2150 8 filed at 08/12/2024 1302   Can you do yard work like raking leaves, weeding or pushing a mower? 0  filed at 06/24/2025 2150 4.5 filed at 08/12/2024 1302   Can you have sexual relations? 5.25  filed at 06/24/2025 2150 5.25 filed at 08/12/2024 1302   Can  you participate in moderate recreational activities like golf, bowling, dancing, doubles tennis or throwing a baseball or football? 0  filed at 06/24/2025 2150 0 filed at 08/12/2024 1302   Can you participate in strenous sports like swimming, singles tennis, football, basketball, or skiing? 0  filed at 06/24/2025 2150 0 filed at 08/12/2024 1302   DASI SCORE 24.2  filed at 06/24/2025 2150 44.7 filed at 08/12/2024 1302   METS Score (Will be calculated only when all the questions are answered) 5.7  filed at 06/24/2025 2150 8.2 filed at 08/12/2024 1302     Caprini DVT Assessment    No data to display  Modified Frailty Index    No data to display  [id number] Stroke Risk Points  Current as of just now        3 0 to 9 Points:      No Change          The BPF7XM3-[id number] risk score (Lip SEAMUS, et al. 2009. © 2010 [employer]) quantifies the risk of stroke for a patient with atrial fibrillation. For patients without atrial fibrillation or under the age of 18 this score appears as N/A. Higher score values generally indicate higher risk of stroke.          Points Metrics   0 Has Congestive Heart Failure:  No     Patients with congestive heart failure get 1 point.    Current as of just now   1 Has Hypertension:  Yes     Patients with hypertension get 1 point.    Current as of just now   1 Age:  75     Patients 65 to 74 years old get 1 point, or patients 75 years and older get 2 points.    Current as of just now   0 Has Diabetes:  No     Patients with diabetes get 1 point.    Current as of just now   0 Had Stroke:  No  Had TIA:  No  Had Thromboembolism:  No     Patients who have had a stroke, TIA, or thromboembolism get 2 points.    Current as of just now   1 Has Vascular Disease:  Yes     Patients with vascular disease get 1 point.    Current as of just now   0 Clinically Relevant Sex:  Male     Patients with a clinically relevant sex of Female get 1 point.    Current as of just now             Revised  Cardiac Risk Index    No data to display  Apfel Simplified Score    No data to display  Risk Analysis Index Results This Encounter    No data found in the last 10 encounters.       Stop Bang Score      Flowsheet Row Pre-Admission Testing from 8/12/2024 in [facility]   Do you snore loudly? 1 filed at 08/12/2024 1303   Do you often feel tired or fatigued after your sleep? 0 filed at 08/12/2024 1303   Has anyone ever observed you stop breathing in your sleep? 0 filed at 08/12/2024 1303   Do you have or are you being treated for high blood pressure? 1 filed at 08/12/2024 1303   Recent BMI (Calculated) 31.9 filed at 08/12/2024 1303   Is BMI greater than 35 kg/m2? 0=No filed at 08/12/2024 1303   Age older than 50 years old? 1=Yes filed at 08/12/2024 1303   Is your neck circumference greater than 17 inches (Male) or 16 inches (Female)? 0 filed at 08/12/2024 1303   Gender - Male 1=Yes filed at 08/12/2024 1303   STOP-BANG Total Score 4 filed at 08/12/2024 1303     Prodigy: High Risk  Total Score: 20              Prodigy Age Score      Prodigy Gender Score          ARISCAT Score for Postoperative Pulmonary Complications    No data to display  [place] Perioperative Risk for Myocardial Infarction or Cardiac Arrest ([place])    No data to display      Nurse Plan of Action:   RN screening call complete.  Reviewed allergies, medications and pharmacy, medical, surgical and social history with patient.  Chart updated.  Instructed patient to stop Eliquis prior to surgery, will give stop date on PAT appointment, pt stated verbally understood.              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     Erectile dysfunction     GERD (gastroesophageal reflux disease)     Hyperlipidemia     Hypertension     Hypothyroidism     Obesity     Peripheral neuropathy September 2025    Sleep apnea     CPAP   [2]   Past Surgical History:  Procedure Laterality Date    APPENDECTOMY  03/03/2015    Appendectomy    BACK SURGERY      CIRCUMCISION, PRIMARY      COLONOSCOPY  03/03/2015    Complete Colonoscopy    FLEXIBLE SIGMOIDOSCOPY      HERNIA REPAIR  03/03/2015    Hernia Repair    LUMBAR LAMINECTOMY  2002    OTHER SURGICAL HISTORY  02/17/2017    Laminectomy Decompressive Up To Two Lumbar Segments    OTHER SURGICAL HISTORY  04/08/2021    Back surgery    PROSTATE SURGERY  2024    HoLEP    SINUS SURGERY  03/03/2015    Sinus Surgery    THYROID SURGERY      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    TONSILLECTOMY  03/03/2015    Tonsillectomy    TOTAL THYROIDECTOMY  01/05/2022    Thyroid Surgery Total Thyroidectomy    UPPER GASTROINTESTINAL ENDOSCOPY     [3]   Family History  Problem Relation Name Age of Onset    Breast cancer Mother      Other (mesothelioma) Father Dilshad Sr     Cancer Father Dilshad Sr     Breast cancer Sister      No Known Problems Sister      No Known Problems Sister      Cancer Brother     [4]   Allergies  Allergen Reactions    Sulfamethoxazole-Trimethoprim Anaphylaxis     patient states throat swells    Other Other     Mildew    Griseofulvin Itching     antifungals    Pollen Extracts Other     sneezing

## 2025-07-24 ENCOUNTER — LAB (OUTPATIENT)
Dept: LAB | Facility: HOSPITAL | Age: 75
End: 2025-07-24
Payer: MEDICARE

## 2025-07-24 ENCOUNTER — PRE-ADMISSION TESTING (OUTPATIENT)
Dept: PREADMISSION TESTING | Facility: HOSPITAL | Age: 75
End: 2025-07-24
Payer: MEDICARE

## 2025-07-24 VITALS
DIASTOLIC BLOOD PRESSURE: 76 MMHG | OXYGEN SATURATION: 97 % | SYSTOLIC BLOOD PRESSURE: 130 MMHG | BODY MASS INDEX: 31.67 KG/M2 | WEIGHT: 213.85 LBS | RESPIRATION RATE: 16 BRPM | TEMPERATURE: 97.8 F | HEIGHT: 69 IN | HEART RATE: 71 BPM

## 2025-07-24 DIAGNOSIS — Z01.818 PREOP TESTING: Primary | ICD-10-CM

## 2025-07-24 DIAGNOSIS — Z01.818 ENCOUNTER FOR OTHER PREPROCEDURAL EXAMINATION: Primary | ICD-10-CM

## 2025-07-24 DIAGNOSIS — R73.9 ELEVATED BLOOD SUGAR: ICD-10-CM

## 2025-07-24 DIAGNOSIS — R73.9 HYPERGLYCEMIA, UNSPECIFIED: ICD-10-CM

## 2025-07-24 LAB
ANION GAP SERPL CALC-SCNC: 10 MMOL/L (ref 10–20)
BASOPHILS # BLD AUTO: 0.02 X10*3/UL (ref 0–0.1)
BASOPHILS NFR BLD AUTO: 0.4 %
BUN SERPL-MCNC: 24 MG/DL (ref 6–23)
CALCIUM SERPL-MCNC: 9.1 MG/DL (ref 8.6–10.3)
CHLORIDE SERPL-SCNC: 105 MMOL/L (ref 98–107)
CO2 SERPL-SCNC: 28 MMOL/L (ref 21–32)
CREAT SERPL-MCNC: 1.09 MG/DL (ref 0.5–1.3)
EGFRCR SERPLBLD CKD-EPI 2021: 71 ML/MIN/1.73M*2
EOSINOPHIL # BLD AUTO: 0.17 X10*3/UL (ref 0–0.4)
EOSINOPHIL NFR BLD AUTO: 3.2 %
ERYTHROCYTE [DISTWIDTH] IN BLOOD BY AUTOMATED COUNT: 12.9 % (ref 11.5–14.5)
GLUCOSE SERPL-MCNC: 89 MG/DL (ref 74–99)
HCT VFR BLD AUTO: 39.4 % (ref 41–52)
HGB BLD-MCNC: 13.2 G/DL (ref 13.5–17.5)
IMM GRANULOCYTES # BLD AUTO: 0.01 X10*3/UL (ref 0–0.5)
IMM GRANULOCYTES NFR BLD AUTO: 0.2 % (ref 0–0.9)
LYMPHOCYTES # BLD AUTO: 1.29 X10*3/UL (ref 0.8–3)
LYMPHOCYTES NFR BLD AUTO: 24 %
MCH RBC QN AUTO: 28.9 PG (ref 26–34)
MCHC RBC AUTO-ENTMCNC: 33.5 G/DL (ref 32–36)
MCV RBC AUTO: 86 FL (ref 80–100)
MONOCYTES # BLD AUTO: 0.42 X10*3/UL (ref 0.05–0.8)
MONOCYTES NFR BLD AUTO: 7.8 %
NEUTROPHILS # BLD AUTO: 3.47 X10*3/UL (ref 1.6–5.5)
NEUTROPHILS NFR BLD AUTO: 64.4 %
NRBC BLD-RTO: 0 /100 WBCS (ref 0–0)
PLATELET # BLD AUTO: 262 X10*3/UL (ref 150–450)
POTASSIUM SERPL-SCNC: 4.4 MMOL/L (ref 3.5–5.3)
RBC # BLD AUTO: 4.57 X10*6/UL (ref 4.5–5.9)
SODIUM SERPL-SCNC: 139 MMOL/L (ref 136–145)
WBC # BLD AUTO: 5.4 X10*3/UL (ref 4.4–11.3)

## 2025-07-24 PROCEDURE — 83036 HEMOGLOBIN GLYCOSYLATED A1C: CPT

## 2025-07-24 PROCEDURE — 99204 OFFICE O/P NEW MOD 45 MIN: CPT | Performed by: PHYSICIAN ASSISTANT

## 2025-07-24 PROCEDURE — 87081 CULTURE SCREEN ONLY: CPT | Mod: AHULAB | Performed by: PHYSICIAN ASSISTANT

## 2025-07-24 PROCEDURE — 85025 COMPLETE CBC W/AUTO DIFF WBC: CPT

## 2025-07-24 PROCEDURE — 80048 BASIC METABOLIC PNL TOTAL CA: CPT

## 2025-07-24 PROCEDURE — 36415 COLL VENOUS BLD VENIPUNCTURE: CPT

## 2025-07-24 RX ORDER — CHLORHEXIDINE GLUCONATE ORAL RINSE 1.2 MG/ML
SOLUTION DENTAL
Qty: 475 ML | Refills: 0 | Status: SHIPPED | OUTPATIENT
Start: 2025-07-24 | End: 2025-07-31 | Stop reason: HOSPADM

## 2025-07-24 RX ORDER — DEXTROMETHORPHAN HYDROBROMIDE, GUAIFENESIN 5; 100 MG/5ML; MG/5ML
650 LIQUID ORAL EVERY 8 HOURS PRN
COMMUNITY
End: 2025-07-31 | Stop reason: HOSPADM

## 2025-07-24 ASSESSMENT — ENCOUNTER SYMPTOMS
COUGH: 0
DYSURIA: 0
CHILLS: 0
DOUBLE VISION: 0
CONSTIPATION: 1
RHINORRHEA: 0
BLOOD IN STOOL: 0
SHORTNESS OF BREATH: 0
EYE PAIN: 0
WEAKNESS: 0
CONFUSION: 0
EXCESSIVE BLEEDING: 0
PALPITATIONS: 0
VOMITING: 0
DYSPNEA AT REST: 0
VISUAL CHANGE: 0
LIMITED RANGE OF MOTION: 1
NECK STIFFNESS: 0
HEMOPTYSIS: 0
BRUISES/BLEEDS EASILY: 1
DIARRHEA: 0
TROUBLE SWALLOWING: 0
EYE DISCHARGE: 0
SINUS CONGESTION: 0
DYSPNEA WITH EXERTION: 0
NUMBNESS: 0
UNEXPECTED WEIGHT CHANGE: 0
MYALGIAS: 0
ABDOMINAL PAIN: 0
LIGHT-HEADEDNESS: 0
NAUSEA: 0
WOUND: 0
ABDOMINAL DISTENTION: 0
ARTHRALGIAS: 1
NECK PAIN: 0
DIFFICULTY URINATING: 0
WHEEZING: 0
SKIN CHANGES: 0
FEVER: 0

## 2025-07-24 NOTE — PREPROCEDURE INSTRUCTIONS
Medication List            Accurate as of July 24, 2025  1:16 PM. Always use your most recent med list.                acetaminophen 650 mg ER tablet  Commonly known as: Tylenol 8 HOUR  Medication Adjustments for Surgery: Take/Use as prescribed     amLODIPine 5 mg tablet  Commonly known as: Norvasc  Take 1 tablet (5 mg) by mouth once daily.  Medication Adjustments for Surgery: Take on the morning of surgery     apixaban 5 mg tablet  Commonly known as: Eliquis  Take 1 tablet (5 mg) by mouth 2 times a day.  Medication Adjustments for Surgery: Take last dose 3 days before surgery  Notes to patient: Last dose 7/27/25     aspirin 81 mg tablet  Medication Adjustments for Surgery: Take on the morning of surgery     azelastine 137 mcg (0.1 %) nasal spray  Commonly known as: Astelin  Medication Adjustments for Surgery: Take/Use as prescribed     chlorhexidine 0.12 % solution  Commonly known as: Peridex  Swish for 30 seconds and spit 15mL of solution the night before and morning of surgery  Medication Adjustments for Surgery: Take/Use as prescribed     desoximetasone 0.25 % cream  Commonly known as: Topicort  Medication Adjustments for Surgery: Do Not take on the morning of surgery     famotidine 20 mg tablet  Commonly known as: Pepcid  Medication Adjustments for Surgery: Take on the morning of surgery     hydrocortisone 1 % ointment  Medication Adjustments for Surgery: Do Not take on the morning of surgery     levothyroxine 112 mcg tablet  Commonly known as: Synthroid, Levoxyl  TAKE 1 TABLET (112 MCG) BY MOUTH ONCE DAILY IN THE MORNING. TAKE ON EMPTY STOMACH  Medication Adjustments for Surgery: Take on the morning of surgery     lovastatin 40 mg tablet  Commonly known as: Mevacor  Take 1 tablet (40 mg) by mouth once daily at bedtime.  Medication Adjustments for Surgery: Take/Use as prescribed     MELATONIN ORAL  Medication Adjustments for Surgery: Take/Use as prescribed     meloxicam 15 mg tablet  Commonly known as:  Mobic  TAKE 1 TABLET (15 MG) BY MOUTH ONCE DAILY. TO TAKE WITH FOOD AND 1 GLASS OF WATER  Additional Medication Adjustments for Surgery: Take last dose 7 days before surgery  Notes to patient: Stop now     Nasacort 55 mcg nasal inhaler  Generic drug: triamcinolone  Medication Adjustments for Surgery: Take/Use as prescribed     neomycin-polymyxin-pramoxine 3.5-10,000-10 mg-unit-mg/gram cream  Commonly known as: Neosporin  Medication Adjustments for Surgery: Do Not take on the morning of surgery     Slow Fe 137 mg (45 mg iron) tablet extended release  Generic drug: ferrous sulfate  Medication Adjustments for Surgery: Do Not take on the morning of surgery     tadalafil 10 mg tablet  Commonly known as: Cialis  Take 1 tablet (10 mg) by mouth if needed for erectile dysfunction.  Medication Adjustments for Surgery: Take last dose 3 days before surgery  Notes to patient: Last dose 7/27/25     ZyrTEC 10 mg tablet  Generic drug: cetirizine  Medication Adjustments for Surgery: Take last dose 1 day (24 hours) before surgery                          **Concerning above medication instructions, if medication is normally taken at night, continue normal schedule.**  **DO NOT TAKE NIGHT PRIOR AND MORNING OF SURGERY**    CONTACT SURGEON'S OFFICE IF YOU DEVELOP:  * Fever = 100.4 F   * New respiratory symptoms (e.g. cough, shortness of breath, respiratory distress, sore throat)  * Recent loss of taste or smell  *Flu like symptoms such as headache, fatigue or gastrointestinal symptoms  * You develop any open sores, shingles, burning or painful urination   AND/OR:  * You no longer wish to have the surgery.  * Any other personal circumstances change that may lead to the need to cancel or defer this surgery.  *You were admitted to any hospital within one week of your planned procedure.    SMOKING:  *Quitting smoking can make a huge difference to your health and recovery from surgery.    *If you need help with quitting, call  1-800-QUIT-NOW.    THE DAY OF SURGERY:  *Do not eat any food after midnight the night before surgery.   *You must drink 13.5 ounces of clear liquids (i.e. water, black coffee (no milk or cream), tea, apple juice or electrolyte drinks (gatorade)) 2 hours before your arrival time.  *You may chew gum until 2 hours before your surgery    SURGICAL TIME  *You will be contacted between 2 p.m. and 6 p.m. the business day before your surgery with your arrival time.  *If you haven't received a call by 6pm, call [phone].  *Scheduled surgery times may change and you will be notified if this occurs-check your personal voicemail for any updates.    ON THE MORNING OF SURGERY:  *Wear comfortable, loose fitting clothing.   *Do not use moisturizers, creams, lotions or perfume.  *All jewelry and valuables should be left at home.  *Prosthetic devices such as contact lenses, hearing aids, dentures, eyelash extensions, hairpins and body piercing must be removed before surgery.    BRING WITH YOU:  *Photo ID and insurance card  *Current list of medicines and allergies  *Pacemaker/Defibrillator/Heart stent cards  *CPAP machine and mask  *Slings/splints/crutches  *Copy of your complete Advanced Directive/DHPOA-if applicable  *Neurostimulator implant remote    PARKING AND ARRIVAL:  *Check in at the Main Entrance desk and let them know you are here for surgery.  *You will be directed to the 2nd floor surgical waiting area.    AFTER OUTPATIENT SURGERY:  *A responsible adult MUST accompany you at the time of discharge and stay with you for 24 hours after your surgery.  *You may NOT drive yourself home after surgery.  *You may use a taxi or ride sharing service ([employer], [employer]) to return home ONLY if you are accompanied by a friend or family member.  *Instructions for resuming your medications will be provided by your surgeon.       Home Preoperative Antibacterial Shower     What is a home preoperative antibacterial shower?  This shower is a way  of cleaning the skin with a germ killing soap before surgery.  The soap contains chlorhexidine, commonly known as CHG.  CHG is a soap for your skin with germ killing ability.  Let your doctor know if you are allergic to chlorhexidine.    Why do I need to take a preoperative antibacterial shower?  Skin is not sterile.  It is best to try to make your skin as free of germs as possible before surgery.  Proper cleansing with a germ killing soap before surgery can lower the number of germs on your skin.  This helps to reduce the risk of infection at the surgical site.  Following the instructions listed below will help you prepare your skin for surgery.      How do I use the CHG skin cleanser?  Steps:  Begin using your CHG soap five days before your scheduled surgery on ________________________.    Days 1-4 Shower before bed:  Wash your face and genitals with your normal soap and rinse.           2.    Apply the CHG soap to a clean wet washcloth.  Turn the water off or move away                From the water spray to avoid premature rinsing of the CHG soap as you are applying.     3.   Lather your entire body from the neck down.  Do not use on your face or genitals.  4. Pay special attention to the area(s) where your incision(s) will be located unless they are on your face.  Avoid scrubbing your skin too hard.  The important point is to have the CHG soap sit on your skin for 3 minutes.    When the 3 minutes are up, turn on the water and rinse the CHG soap off your body completely.   Pat yourself dry with a clean, freshly-laundered towel.  Dress in clean, freshly laundered night clothes.    Be sure to change bed sheets and blankets at least on the first night of CHG body wash use. May change linens every night of the above protocol for maximum benefit.   Day 5:  Last shower is the morning of surgery: Follow above Instructions.    NOTE:        *Keep CHG soap out of eyes and ear canals   *DO NOT wash with regular soap on your  body after you have used the CHG soap solution  *DO NOT apply powders, lotions, or perfume.  *Deodorant may be used days 1-4, BUT NOT the day of surgery    Who should I contact if I have any questions regarding the use of CHG soap?  Call the [facility], Preadmission Testing at [phone] if you have any questions.              Patient Information: Pre-Operative Infection Prevention Measures     Why did I have my nose, under my arms and groin swabbed?  The purpose of the swab is to identify Staphylococcus aureus inside your nose or on your skin.  The swab was sent to the laboratory for culture.  A positive swab/culture for Staphylococcus aureus is called colonization or carriage.      What is Staphylococcus aureus?  Staphylococcus aureus, also known as “staph”, is a germ found on the skin or in the nose of healthy people.  Sometimes Staphylococcus aureus can get into the body and cause an infection.  This can be minor (such as pimples, boils or other skin problems).  It might also be serious (such as blood infection, pneumonia or a surgical site infection).    What is Staphylococcus aureus colonization or carriage?  Colonization or carriage means that a person has the germ but is not sick from it.  These bacteria can be spread on the hands or when breathing or sneezing.    How is Staphylococcus aureus spread?  It is most often spread by close contact with a person or item that carries it.    What happens if my culture is positive for Staphylococcus aureus?  Your doctor/medical team will use this information to guide any antibiotic treatment which may be necessary.  Regardless of the culture results, we will clean the inside of your nose with a betadine swab just before you have your surgery.      Will I get an infection if I have Staphylococcus aureus in my nose or on my skin?  Anyone can get an infection with Staphylococcus aureus.  However, the best way to reduce your risk of  infection is to follow the instructions provided to you for the use of your CHG soap and dental rinse.        Who should I contact if I have any questions?  Call the [facility], Preadmission Testing at [phone] if you have any questions.          Patient Information: Oral/Dental Rinse  **This is a prescription; pick it up at your preferred local pharmacy **  What is oral/dental rinse?   It is a mouthwash. It is a way of cleaning the mouth with a germ killing solution before your surgery.  The solution contains chlorhexidine, commonly known as CHG.   It is used inside the mouth to kill a bacteria known as Staphylococcus aureus.  Let your doctor know if you are allergic to Chlorhexidine.    Why do I need to use CHG oral/dental rinse?  The CHG oral/dental rinse helps to kill a bacteria in your mouth known a Staphylococcus aureus.     This reduces the risk of infection at the surgical site.      Using your CHG oral/dental rinse  STEPS:  Use your CHG oral/dental rinse after you brush your teeth the night before (at bedtime) and the morning of your surgery.  Follow all directions on your prescription label.    Use the cap on the container to measure 15ml (fill cap to fill line)  Swish (gargle if you can) the mouthwash in your mouth for at least 30 seconds, (do not to swallow) spit out  After you use your CHG rinse, do not rinse your mouth with water, drink or eat.  Please refer to prescription label for the appropriate time to resume oral intake  Dental rinse comes in one size bottle: 473ml ~16oz.  You will have leftover    rinse, discard after this use.    What side effects might I have using the CHG oral/dental rinse?  CHG rinse will stick to plaque on the teeth.  Brush and floss just before use.  Teeth brushing will help avoid staining of plaque during use.    Who should I contact if I have questions about the CHG oral/dental rinse?  Please call [facility], Preadmission Testing at [phone] if you have any questions          Incentive Spirometer   You were provided with an incentive spirometer in [facility]/PAT, please follow the below instructions.    What is an incentive spirometer?  An incentive spirometer is a device used before and after surgery to “exercise” your lungs.  It helps you to take deeper breaths to expand your lungs.  Below is an example of a basic incentive spirometer.  The device you receive may differ slightly but they all function the same.    Why do I need to use an incentive spirometer?  Using your incentive spirometer prepares your lungs for surgery and helps prevent lung problems after surgery.  How do I use my incentive spirometer?  When you're using your incentive spirometer, make sure to breathe through your mouth. If you breathe through your nose, the incentive spirometer won't work properly. You can hold your nose if you have trouble.  If you feel dizzy at any time, stop and rest. Try again at a later time.  Follow the steps below:  Set up your incentive spirometer, expand the flexible tubing and connect to the outlet.  Sit upright in a chair or bed. Hold the incentive spirometer at eye level.   Put the mouthpiece in your mouth and close your lips tightly around it. Slowly breathe out (exhale) completely.  Breathe in (inhale) slowly through your mouth as deeply as you can. As you take a breath, you will see the piston rise inside the large column. While the piston rises, the indicator should move upwards. It should stay in between the 2 arrows (see Figure).  Try to get the piston as high as you can, while keeping the indicator between the arrows.   If the indicator doesn't stay between the arrows, you're breathing either too fast or too slow.  When you get it as high as you can, hold your breath for 10 seconds, or as long as possible. While you're holding your breath, the piston will slowly fall to the base of the spirometer.  Once the  piston reaches the bottom of the spirometer, breathe out slowly through your mouth. Rest for a few seconds.  Repeat 10 times. Try to get the piston to the same level with each breath.  Repeat every hour while awake  You can carefully clean the outside of the mouthpiece with an alcohol wipe or soap and water.        Preoperative Deep Breathing Exercises  Why it is important to do deep breathing exercises before my surgery?  Deep breathing exercises strengthen your breathing muscles.  This helps you to recover after your surgery and decreases the chance of breathing complications.  How are the deep breathing exercises done?  Sit straight with your back supported.  Breathe in deeply and slowly through your nose. Your lower rib cage should expand and your abdomen may move forward.  Hold that breath for 3 to 5 seconds.  Breathe out through pursed lips, slowly and completely.  Rest and repeat 10 times every hour while awake.  Rest longer if you become dizzy or lightheaded.        Preoperative Brain Exercises    What are brain exercises?  A brain exercise is any activity that engages your thinking (cognitive) skills.    What types of activities are considered brain exercises?  Jigsaw puzzles, crossword puzzles, word jumble, memory games, word search, and many more.  Many can be found free online or on your phone via a mobile carlos.    Why should I do brain exercises before my surgery?  More recent research has shown brain exercise before surgery can lower the risk of postoperative delirium (confusion) which can be especially important for older adults.  Patients who did brain exercises for 5 to 10 hours (total) for the 7-10 days before surgery, cut their risk of postoperative delirium in half up to 1 week after surgery.

## 2025-07-24 NOTE — H&P (VIEW-ONLY)
"[facility]/PAT Evaluation       Name: Gaurav Fischer (Gaurav Fischer \"Rich\")  /Age: 1950/75 y.o.       Date of Consult: 25    Referring Provider: Dr. Barber    Surgery, Date, and Length:     Left Hip Total Arthroplasty ~ Anterior Approach - Left   25, 150MIN    Gaurav Fischer is a 75 y.o. year-old male who [facility] perioperative risk assessment prior to surgery.    Patient presents with a primary diagnosis of left hip osteoarthritis. He refers to pain in left hip for about for about 12 months.  He has tried steroid joint injections, most recently in 2025 which provided about 3 months of pain relief.  He has tried meloxicam, motrin and tylenol which provided minimal pain relief. He has a difficult time donning and doffing shoes.  He wishes to proceed with TJR as planned.     This note was created in part upon personal review of patient's medical records.      Patient is scheduled to have Left Hip Total Arthroplasty ~ Anterior Approach - Left      Pt denies any past history of anesthetic complications such as PONV, awareness, prolonged sedation, dental damage, aspiration, cardiac arrest, difficult intubation, difficult I.V. access or unexpected hospital admissions.  NO malignant hyperthermia and or pseudocholinesterase deficiency.  No history of blood transfusions     The patient is not a [employer] and will accept blood and blood products if medically indicated.   Type and screen NOT sent.     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    Erectile dysfunction     GERD (gastroesophageal reflux disease)     Hyperlipidemia     Hypertension     " Hypothyroidism     Obesity     Peripheral neuropathy 2025    Sleep apnea     CPAP       Past Surgical History:   Procedure Laterality Date    APPENDECTOMY  2015    Appendectomy    BACK SURGERY      CIRCUMCISION, PRIMARY      COLONOSCOPY  2015    Complete Colonoscopy    FLEXIBLE SIGMOIDOSCOPY      HERNIA REPAIR  2015    Hernia Repair    LUMBAR LAMINECTOMY  2002    OTHER SURGICAL HISTORY  2017    Laminectomy Decompressive Up To Two Lumbar Segments    OTHER SURGICAL HISTORY  2021    Back surgery    PROSTATE SURGERY      HoLEP    SINUS SURGERY  2015    Sinus Surgery    THYROID SURGERY      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    TONSILLECTOMY  2015    Tonsillectomy    TOTAL THYROIDECTOMY  2022    Thyroid Surgery Total Thyroidectomy    UPPER GASTROINTESTINAL ENDOSCOPY         Patient  reports being sexually active and has had partner(s) who are female. He reports using the following method of birth control/protection: None.    Family History   Problem Relation Name Age of Onset    Breast cancer Mother      Other (mesothelioma) Father Dilshad Sr     Cancer Father Dilshad Sr     Breast cancer Sister      No Known Problems Sister      No Known Problems Sister      Cancer Brother       Social History     Socioeconomic History    Marital status:      Spouse name: Not on file    Number of children: Not on file    Years of education: Not on file    Highest education level: Not on file   Occupational History    Not on file   Tobacco Use    Smoking status: Former     Current packs/day: 0.00     Average packs/day: 0.3 packs/day for 0.6 years (0.2 ttl pk-yrs)     Types: Cigars, Cigarettes     Start date: 1971     Quit date: 1971     Years since quittin.6    Smokeless tobacco: Never   Vaping Use    Vaping status: Never Used   Substance and Sexual Activity    Alcohol use: Yes     Alcohol/week: 2.0 standard drinks of alcohol     Types: 2 Cans of beer per week      Comment: 1 can per day usually on Tuesday and on Saturday    Drug use: Never    Sexual activity: Yes     Partners: Female     Birth control/protection: None   Other Topics Concern    Not on file   Social History Narrative    Not on file     Social Drivers of Health     Financial Resource Strain: Not on file   Food Insecurity: Not on file   Transportation Needs: Not on file   Physical Activity: Not on file   Stress: Not on file   Social Connections: Not on file   Intimate Partner Violence: Not on file   Housing Stability: Not on file        Allergies   Allergen Reactions    Sulfamethoxazole-Trimethoprim Anaphylaxis     patient states throat swells    Other Other     Mildew    Griseofulvin Itching     antifungals    Pollen Extracts Other     sneezing       Current Outpatient Medications   Medication Instructions    acetaminophen (TYLENOL 8 HOUR) 650 mg, Every 8 hours PRN    amLODIPine (NORVASC) 5 mg, oral, Daily    apixaban (ELIQUIS) 5 mg, oral, 2 times daily    aspirin 81 mg tablet 1 tablet, Daily    azelastine (Astelin) 137 mcg (0.1 %) nasal spray Administer into affected nostril(s).    cetirizine (ZYRTEC) 10 mg, As needed    chlorhexidine (Peridex) 0.12 % solution Swish for 30 seconds and spit 15mL of solution the night before and morning of surgery    desoximetasone (Topicort) 0.25 % cream Desoximetasone 0.25 % External Cream APPLY AND GENTLY MASSAGE INTO AFFECTED AREA(S) TWICE DAILY. as needed Refills: 0 Start : 26-Oct-2016 Active    famotidine (PEPCID) 20 mg, Daily    ferrous sulfate (Slow Fe) 137 mg (45 mg iron) tablet extended release 1 tablet, Daily    hydrocortisone 1 % ointment As needed    levothyroxine (SYNTHROID, LEVOXYL) 112 mcg, oral, Every morning, Take on empty stomach    lovastatin (MEVACOR) 40 mg, oral, Nightly    MELATONIN ORAL 1 mg    meloxicam (MOBIC) 15 mg, oral, Daily, To take with food and 1 glass of water    neomycin-polymyxin-pramoxine (Neosporin) 3.5-10,000-10 mg-unit-mg/gram cream  Apply topically.    tadalafil (CIALIS) 10 mg, oral, As needed    triamcinolone (Nasacort) 55 mcg nasal inhaler Administer into affected nostril(s).           PAT ROS:   Constitutional:    no fever   no chills   no unexpected weight change  Neuro/Psych:    no numbness   no weakness   no light-headedness   no confusion  Eyes:    no discharge   no pain   no vision loss   no diplopia   no visual disturbance   use of corrective lenses  Ears:    no ear pain   no hearing loss   no tinnitus  Nose:    no nasal discharge   no sinus congestion   no epistaxis  Mouth:    no dental issues   no mouth pain   no oral bleeding   no mouth lesions  Throat:    no throat pain   no dysphagia  Neck:    no neck pain   no neck stiffness  Cardio:    Functional 4 Mets. Patient denies SOB walking up 2 flights of stairs   Shooting pool; square dancing up until hip started bothering him  Household; grocery shopping   no chest pain   no palpitations   no peripheral edema   no dyspnea   no AVILA  Respiratory:    no cough   no wheezing   no hemoptysis   no shortness of breath  Endocrine:    no cold intolerance   no heat intolerance  GI:    no abdominal distention   no abdominal pain   constipation   no diarrhea   no nausea   no vomiting   no blood in stool  :    no difficulty urinating   no dysuria   no oliguria  Musculoskeletal:    arthralgias (left hip)   no myalgias   decreased ROM (left hip)  Hematologic:    bruises/bleeds easily (Eliquis and ASA 81mg)   no excessive bleeding   no history of blood transfusion   no blood clots  Skin:   no skin changes   no sores/wound   no rash      Physical Exam  Constitutional:       General: He is not in acute distress.     Appearance: Normal appearance. He is not ill-appearing, toxic-appearing or diaphoretic.   HENT:      Head: Normocephalic and atraumatic.      Nose: Nose normal. No congestion or rhinorrhea.      Mouth/Throat:      Mouth: Mucous membranes are moist.      Pharynx: No posterior oropharyngeal  "erythema.     Eyes:      Extraocular Movements: Extraocular movements intact.      Conjunctiva/sclera: Conjunctivae normal.       Cardiovascular:      Rate and Rhythm: Normal rate and regular rhythm.      Pulses: Normal pulses.      Heart sounds: Normal heart sounds. No murmur heard.     No friction rub. No gallop.   Pulmonary:      Effort: Pulmonary effort is normal. No respiratory distress.      Breath sounds: Normal breath sounds. No stridor. No wheezing, rhonchi or rales.   Abdominal:      General: Bowel sounds are normal. There is no distension.      Palpations: Abdomen is soft. There is no mass.      Tenderness: There is no abdominal tenderness. There is no guarding or rebound.      Hernia: No hernia is present.     Musculoskeletal:         General: Tenderness (left hip; pain and decreased ROM with external rotation of left hip) present. No swelling, deformity or signs of injury.      Cervical back: Normal range of motion and neck supple. No rigidity or tenderness.     Skin:     General: Skin is warm and dry.      Coloration: Skin is not jaundiced or pale.      Findings: No bruising, erythema, lesion or rash.     Neurological:      General: No focal deficit present.      Mental Status: He is alert and oriented to person, place, and time.      Cranial Nerves: No cranial nerve deficit.      Sensory: No sensory deficit.      Motor: Weakness (left hip weakness with flexion) present.      Coordination: Coordination normal.     Psychiatric:         Mood and Affect: Mood normal.         Behavior: Behavior normal.          PAT AIRWAY:   Airway:     Mallampati::  II    Neck ROM::  Full   No broken teeth, no dentures and no missing teeth          Visit Vitals  /76   Pulse 71   Temp 36.6 °C (97.8 °F)   Resp 16   Ht 1.76 m (5' 9.29\")   Wt 97 kg (213 lb 13.5 oz)   SpO2 97%   BMI 31.31 kg/m²   Smoking Status Former   BSA 2.18 m²      LABS:  Lab Results   Component Value Date    WBC 5.4 07/24/2025    HGB 13.2 (L) " 07/24/2025    HCT 39.4 (L) 07/24/2025    MCV 86 07/24/2025     07/24/2025      Lab Results   Component Value Date    GLUCOSE 89 07/24/2025    CALCIUM 9.1 07/24/2025     07/24/2025    K 4.4 07/24/2025    CO2 28 07/24/2025     07/24/2025    BUN 24 (H) 07/24/2025    CREATININE 1.09 07/24/2025      Lab Results   Component Value Date    HGBA1C 5.5 07/24/2025      EKG 5/22/25  Normal sinus rhythm  Nonspecific T wave abnormality  Abnormal ECG    NST 6/4/25  normal myocardial contractility with an LV ejection  fraction of 57 %     CT calcium score performed 9/20/2021:  .49,  .35,  LCx 0,  RCA 0,  Total   829.84    IMPRESSION:  1. Normal stress myocardial perfusion imaging in response to  pharmacologic stress.  2. Well-maintained left ventricular function.    Assessment and Plan:       75 y.o.  male  scheduled for Left Hip Total Arthroplasty ~ Anterior Approach - Left on 7/31/25 with Dr. Barber for  left hip OA.   Presents to [facility] today for perioperative risk stratification and optimization      Cardiovascular:  Patient has no active cardiac symptoms.   Patient denies any chest pain, tightness, heaviness, pressure, radiating pain, palpitations, irregular heartbeats, lightheadedness, cough, congestion, shortness of breath, AVILA, PND, near syncope, weight loss or gain.    METS: 5.7  RCRI: 0 points; 0.5% risk for postoperative MACE     HTN - cont amlodipine on dos   Encouraged lifestyle modifications, low-sodium diet, and increase activity as tolerated.  Monitor BP and follow up with managing physician for readings sustaining >140/90.    HLD - cont statin as prescribed     CAD - per CT cardiac score of 829 6/4/25; cont ASA 81mg on dos     A fib - CHADsVASc = 3; hold Eliquis 3 days prior to surgery (diagnosed in June 2025; just started eliquis)    --pt follows with Dr. Quincy Benavides ; last seen 5/22/25 with recommendations to follow up 1-2 weeks after NST completion.  NST reviewed (from 6/4/25)  "today and states \"normal stress myocardial perfusion imaging\".  Pt is considered optimized from cardiac standpoint based on these results. Will await final risk assessment from Dr. Benavides.        Pulmonary:  No pulmonary diagnosis, however patient is at increased risk of perioperative complications secondary to  age > 60, obesity, duration of surgery > 2 hours  Stop Bang + DEANGELO  ARISCAT: <26 points, 1.6% risk of in-hospital postoperative pulmonary complication  PRODIGY: High risk for opioid induced respiratory depression    **Pt provided with deep breathing exercises, incentive spirometer and instructions for use during PAT visit today**    DEANGELO - Known and treated  DEANGELO- Patient was instructed to bring CPAP machine the morning of surgery. Recommend prioritizing  nonopioid analgesic techniques (regional and local anesthesia, nonsteroidal medications, etc) before the administration of opioids and close monitoring for hypoventilation after surgery due to DEANGELO. Increased vigilance is recommended with the use of narcotics due to an increased risk for opioid induced respiratory depression     Alpha-1-antitrypsin def - avoid meds that exacerbate this issue; sp02 97% during visit today    Endocrine:  Hypothyroidism - cont levothyroxine on dos      Neuro:  No neurologic diagnosis, however, the patient is at increased risk for perioperative delirium secondary to  age, polypharmacy, type and duration of surgery, Patient instructed on and provided cognitive exercises      Hematology:  Anemia - hold Fe supplement on dos   7/25/25 H/H 13.2/39.4%    Patient instructed to ambulate as soon as possible postoperatively to decrease thromboembolic risk.   Initiate mechanical DVT prophylaxis as soon as possible and initiate chemical prophylaxis when deemed safe from a bleeding standpoint post surgery.     LABS: CBC, BMP, MRSA ,A1c ordered    Followup: MRSA and A1c pending    Addendum 7/25/25:  Lab results reviewed and show mild anemia " which is stable    Caprini: 7    Risk assessment complete.  Patient is scheduled for a intermediate surgical risk procedure.        Preoperative medication instructions were provided and reviewed with the patient.  Any additional testing or evaluation was explained to the patient.  Nothing by mouth instructions were discussed and patient's questions were answered prior to conclusion to this encounter.  Patient verbalized understanding of preoperative instructions given in preadmission testing; discharge instructions available in EMR.    This note was dictated by a speech recognition.  Minor errors may have been detected in a speech recognition.

## 2025-07-24 NOTE — CPM/PAT H&P
"[facility]/PAT Evaluation       Name: Gaurav Fischer (Gaurav Fischer \"Rich\")  /Age: 1950/75 y.o.       Date of Consult: 25    Referring Provider: Dr. Barber    Surgery, Date, and Length:     Left Hip Total Arthroplasty ~ Anterior Approach - Left   25, 150MIN    Gaurav Fischer is a 75 y.o. year-old male who presents to the [facility] for perioperative risk assessment prior to surgery.    Patient presents with a primary diagnosis of left hip osteoarthritis. He refers to pain in left hip for about for about 12 months.  He has tried steroid joint injections, most recently in 2025 which provided about 3 months of pain relief.  He has tried meloxicam, motrin and tylenol which provided minimal pain relief. He has a difficult time donning and doffing shoes.  He wishes to proceed with TJR as planned.     This note was created in part upon personal review of patient's medical records.      Patient is scheduled to have Left Hip Total Arthroplasty ~ Anterior Approach - Left      Pt denies any past history of anesthetic complications such as PONV, awareness, prolonged sedation, dental damage, aspiration, cardiac arrest, difficult intubation, difficult I.V. access or unexpected hospital admissions.  NO malignant hyperthermia and or pseudocholinesterase deficiency.  No history of blood transfusions     The patient is not a [employer] and will accept blood and blood products if medically indicated.   Type and screen NOT sent.     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    Erectile dysfunction     GERD (gastroesophageal reflux disease)     Hyperlipidemia     Hypertension     " Hypothyroidism     Obesity     Peripheral neuropathy 2025    Sleep apnea     CPAP       Past Surgical History:   Procedure Laterality Date    APPENDECTOMY  2015    Appendectomy    BACK SURGERY      CIRCUMCISION, PRIMARY      COLONOSCOPY  2015    Complete Colonoscopy    FLEXIBLE SIGMOIDOSCOPY      HERNIA REPAIR  2015    Hernia Repair    LUMBAR LAMINECTOMY  2002    OTHER SURGICAL HISTORY  2017    Laminectomy Decompressive Up To Two Lumbar Segments    OTHER SURGICAL HISTORY  2021    Back surgery    PROSTATE SURGERY      HoLEP    SINUS SURGERY  2015    Sinus Surgery    THYROID SURGERY      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    TONSILLECTOMY  2015    Tonsillectomy    TOTAL THYROIDECTOMY  2022    Thyroid Surgery Total Thyroidectomy    UPPER GASTROINTESTINAL ENDOSCOPY         Patient  reports being sexually active and has had partner(s) who are female. He reports using the following method of birth control/protection: None.    Family History   Problem Relation Name Age of Onset    Breast cancer Mother      Other (mesothelioma) Father Dilshad Sr     Cancer Father Dilshad Sr     Breast cancer Sister      No Known Problems Sister      No Known Problems Sister      Cancer Brother       Social History     Socioeconomic History    Marital status:      Spouse name: Not on file    Number of children: Not on file    Years of education: Not on file    Highest education level: Not on file   Occupational History    Not on file   Tobacco Use    Smoking status: Former     Current packs/day: 0.00     Average packs/day: 0.3 packs/day for 0.6 years (0.2 ttl pk-yrs)     Types: Cigars, Cigarettes     Start date: 1971     Quit date: 1971     Years since quittin.6    Smokeless tobacco: Never   Vaping Use    Vaping status: Never Used   Substance and Sexual Activity    Alcohol use: Yes     Alcohol/week: 2.0 standard drinks of alcohol     Types: 2 Cans of beer per week      Comment: 1 can per day usually on Tuesday and on Saturday    Drug use: Never    Sexual activity: Yes     Partners: Female     Birth control/protection: None   Other Topics Concern    Not on file   Social History Narrative    Not on file     Social Drivers of Health     Financial Resource Strain: Not on file   Food Insecurity: Not on file   Transportation Needs: Not on file   Physical Activity: Not on file   Stress: Not on file   Social Connections: Not on file   Intimate Partner Violence: Not on file   Housing Stability: Not on file        Allergies   Allergen Reactions    Sulfamethoxazole-Trimethoprim Anaphylaxis     patient states throat swells    Other Other     Mildew    Griseofulvin Itching     antifungals    Pollen Extracts Other     sneezing       Current Outpatient Medications   Medication Instructions    acetaminophen (TYLENOL 8 HOUR) 650 mg, Every 8 hours PRN    amLODIPine (NORVASC) 5 mg, oral, Daily    apixaban (ELIQUIS) 5 mg, oral, 2 times daily    aspirin 81 mg tablet 1 tablet, Daily    azelastine (Astelin) 137 mcg (0.1 %) nasal spray Administer into affected nostril(s).    cetirizine (ZYRTEC) 10 mg, As needed    chlorhexidine (Peridex) 0.12 % solution Swish for 30 seconds and spit 15mL of solution the night before and morning of surgery    desoximetasone (Topicort) 0.25 % cream Desoximetasone 0.25 % External Cream APPLY AND GENTLY MASSAGE INTO AFFECTED AREA(S) TWICE DAILY. as needed Refills: 0 Start : 26-Oct-2016 Active    famotidine (PEPCID) 20 mg, Daily    ferrous sulfate (Slow Fe) 137 mg (45 mg iron) tablet extended release 1 tablet, Daily    hydrocortisone 1 % ointment As needed    levothyroxine (SYNTHROID, LEVOXYL) 112 mcg, oral, Every morning, Take on empty stomach    lovastatin (MEVACOR) 40 mg, oral, Nightly    MELATONIN ORAL 1 mg    meloxicam (MOBIC) 15 mg, oral, Daily, To take with food and 1 glass of water    neomycin-polymyxin-pramoxine (Neosporin) 3.5-10,000-10 mg-unit-mg/gram cream  Apply topically.    tadalafil (CIALIS) 10 mg, oral, As needed    triamcinolone (Nasacort) 55 mcg nasal inhaler Administer into affected nostril(s).           PAT ROS:   Constitutional:    no fever   no chills   no unexpected weight change  Neuro/Psych:    no numbness   no weakness   no light-headedness   no confusion  Eyes:    no discharge   no pain   no vision loss   no diplopia   no visual disturbance   use of corrective lenses  Ears:    no ear pain   no hearing loss   no tinnitus  Nose:    no nasal discharge   no sinus congestion   no epistaxis  Mouth:    no dental issues   no mouth pain   no oral bleeding   no mouth lesions  Throat:    no throat pain   no dysphagia  Neck:    no neck pain   no neck stiffness  Cardio:    Functional 4 Mets. Patient denies SOB walking up 2 flights of stairs   Shooting pool; square dancing up until hip started bothering him  Household; grocery shopping   no chest pain   no palpitations   no peripheral edema   no dyspnea   no AVILA  Respiratory:    no cough   no wheezing   no hemoptysis   no shortness of breath  Endocrine:    no cold intolerance   no heat intolerance  GI:    no abdominal distention   no abdominal pain   constipation   no diarrhea   no nausea   no vomiting   no blood in stool  :    no difficulty urinating   no dysuria   no oliguria  Musculoskeletal:    arthralgias (left hip)   no myalgias   decreased ROM (left hip)  Hematologic:    bruises/bleeds easily (Eliquis and ASA 81mg)   no excessive bleeding   no history of blood transfusion   no blood clots  Skin:   no skin changes   no sores/wound   no rash      Physical Exam  Constitutional:       General: He is not in acute distress.     Appearance: Normal appearance. He is not ill-appearing, toxic-appearing or diaphoretic.   HENT:      Head: Normocephalic and atraumatic.      Nose: Nose normal. No congestion or rhinorrhea.      Mouth/Throat:      Mouth: Mucous membranes are moist.      Pharynx: No posterior oropharyngeal  "erythema.     Eyes:      Extraocular Movements: Extraocular movements intact.      Conjunctiva/sclera: Conjunctivae normal.       Cardiovascular:      Rate and Rhythm: Normal rate and regular rhythm.      Pulses: Normal pulses.      Heart sounds: Normal heart sounds. No murmur heard.     No friction rub. No gallop.   Pulmonary:      Effort: Pulmonary effort is normal. No respiratory distress.      Breath sounds: Normal breath sounds. No stridor. No wheezing, rhonchi or rales.   Abdominal:      General: Bowel sounds are normal. There is no distension.      Palpations: Abdomen is soft. There is no mass.      Tenderness: There is no abdominal tenderness. There is no guarding or rebound.      Hernia: No hernia is present.     Musculoskeletal:         General: Tenderness (left hip; pain and decreased ROM with external rotation of left hip) present. No swelling, deformity or signs of injury.      Cervical back: Normal range of motion and neck supple. No rigidity or tenderness.     Skin:     General: Skin is warm and dry.      Coloration: Skin is not jaundiced or pale.      Findings: No bruising, erythema, lesion or rash.     Neurological:      General: No focal deficit present.      Mental Status: He is alert and oriented to person, place, and time.      Cranial Nerves: No cranial nerve deficit.      Sensory: No sensory deficit.      Motor: Weakness (left hip weakness with flexion) present.      Coordination: Coordination normal.     Psychiatric:         Mood and Affect: Mood normal.         Behavior: Behavior normal.          PAT AIRWAY:   Airway:     Mallampati::  II    Neck ROM::  Full   No broken teeth, no dentures and no missing teeth          Visit Vitals  /76   Pulse 71   Temp 36.6 °C (97.8 °F)   Resp 16   Ht 1.76 m (5' 9.29\")   Wt 97 kg (213 lb 13.5 oz)   SpO2 97%   BMI 31.31 kg/m²   Smoking Status Former   BSA 2.18 m²      LABS:  Lab Results   Component Value Date    WBC 5.4 07/24/2025    HGB 13.2 (L) " 07/24/2025    HCT 39.4 (L) 07/24/2025    MCV 86 07/24/2025     07/24/2025      Lab Results   Component Value Date    GLUCOSE 89 07/24/2025    CALCIUM 9.1 07/24/2025     07/24/2025    K 4.4 07/24/2025    CO2 28 07/24/2025     07/24/2025    BUN 24 (H) 07/24/2025    CREATININE 1.09 07/24/2025      Lab Results   Component Value Date    HGBA1C 5.5 07/24/2025      EKG 5/22/25  Normal sinus rhythm  Nonspecific T wave abnormality  Abnormal ECG    NST 6/4/25  normal myocardial contractility with an LV ejection  fraction of 57 %     CT calcium score performed 9/20/2021:  .49,  .35,  LCx 0,  RCA 0,  Total   829.84    IMPRESSION:  1. Normal stress myocardial perfusion imaging in response to  pharmacologic stress.  2. Well-maintained left ventricular function.    Assessment and Plan:       75 y.o.  male  scheduled for Left Hip Total Arthroplasty ~ Anterior Approach - Left on 7/31/25 with Dr. Barber for  left hip OA.   Presents to [facility] today for perioperative risk stratification and optimization      Cardiovascular:  Patient has no active cardiac symptoms.   Patient denies any chest pain, tightness, heaviness, pressure, radiating pain, palpitations, irregular heartbeats, lightheadedness, cough, congestion, shortness of breath, AVILA, PND, near syncope, weight loss or gain.    METS: 5.7  RCRI: 0 points; 0.5% risk for postoperative MACE     HTN - cont amlodipine on dos   Encouraged lifestyle modifications, low-sodium diet, and increase activity as tolerated.  Monitor BP and follow up with managing physician for readings sustaining >140/90.    HLD - cont statin as prescribed     CAD - per CT cardiac score of 829 6/4/25; cont ASA 81mg on dos     A fib - CHADsVASc = 3; hold Eliquis 3 days prior to surgery (diagnosed in June 2025; just started eliquis)    --pt follows with Dr. Quincy Benavides ; last seen 5/22/25 with recommendations to follow up 1-2 weeks after NST completion.  NST reviewed (from 6/4/25)  "today and states \"normal stress myocardial perfusion imaging\".  Pt is considered optimized from cardiac standpoint based on these results. Will await final risk assessment from Dr. Benavides.    Pulmonary:  No pulmonary diagnosis, however patient is at increased risk of perioperative complications secondary to  age > 60, obesity, duration of surgery > 2 hours  Stop Bang + DEANGELO  ARISCAT: <26 points, 1.6% risk of in-hospital postoperative pulmonary complication  PRODIGY: High risk for opioid induced respiratory depression    **Pt provided with deep breathing exercises, incentive spirometer and instructions for use during PAT visit today**    DEANGELO - Known and treated  DEANGELO- Patient was instructed to bring CPAP machine the morning of surgery. Recommend prioritizing  nonopioid analgesic techniques (regional and local anesthesia, nonsteroidal medications, etc) before the administration of opioids and close monitoring for hypoventilation after surgery due to DEANGELO. Increased vigilance is recommended with the use of narcotics due to an increased risk for opioid induced respiratory depression     Alpha-1-antitrypsin def - avoid meds that exacerbate this issue; sp02 97% during visit today    Endocrine:  Hypothyroidism - cont levothyroxine on dos      Neuro:  No neurologic diagnosis, however, the patient is at increased risk for perioperative delirium secondary to  age, polypharmacy, type and duration of surgery, Patient instructed on and provided cognitive exercises      Hematology:  Anemia - hold Fe supplement on dos   7/25/25 H/H 13.2/39.4%    Patient instructed to ambulate as soon as possible postoperatively to decrease thromboembolic risk.   Initiate mechanical DVT prophylaxis as soon as possible and initiate chemical prophylaxis when deemed safe from a bleeding standpoint post surgery.     LABS: CBC, BMP, MRSA ,A1c ordered    Followup: MRSA and A1c pending    Addendum 7/25/25:  Lab results reviewed and show mild anemia which is " stable    Caprini: 7    Risk assessment complete.  Patient is scheduled for a intermediate surgical risk procedure.        Preoperative medication instructions were provided and reviewed with the patient.  Any additional testing or evaluation was explained to the patient.  Nothing by mouth instructions were discussed and patient's questions were answered prior to conclusion to this encounter.  Patient verbalized understanding of preoperative instructions given in preadmission testing; discharge instructions available in EMR.    This note was dictated by a speech recognition.  Minor errors may have been detected in a speech recognition.

## 2025-07-24 NOTE — CPM/PAT NURSE NOTE
"[facility]/PAT Nurse Note      Name: Gaurav Fischer (Gaurav Fischer \"Sidney\")  /Age: 1950/75 y.o.       Medical History[1]    Surgical History[2]    Patient  reports being sexually active and has had partner(s) who are female. He reports using the following method of birth control/protection: None.    Family History[3]    Allergies[4]    Prior to Admission medications   Medication Sig Start Date End Date Taking? Authorizing Provider   acetaminophen (Tylenol 8 HOUR) 650 mg ER tablet Take 1 tablet (650 mg) by mouth every 8 hours if needed for mild pain (1 - 3). Do not crush, chew, or split.   Yes Historical Provider, MD   amLODIPine (Norvasc) 5 mg tablet Take 1 tablet (5 mg) by mouth once daily. 3/31/25  Yes Renetta Barnett MD   apixaban (Eliquis) 5 mg tablet Take 1 tablet (5 mg) by mouth 2 times a day. 25 Yes Quincy Benavides DO   aspirin 81 mg tablet Take 1 tablet by mouth once daily.   Yes Historical Provider, MD   azelastine (Astelin) 137 mcg (0.1 %) nasal spray Administer into affected nostril(s).   Yes Historical Provider, MD   cetirizine (ZyrTEC) 10 mg tablet Take 1 tablet (10 mg) by mouth if needed. 3/3/15  Yes Historical Provider, MD   famotidine (Pepcid) 20 mg tablet Take 1 tablet (20 mg) by mouth once daily.   Yes Historical Provider, MD   ferrous sulfate (Slow Fe) 137 mg (45 mg iron) tablet extended release Take 1 tablet (45 mg of elemental iron) by mouth once daily.   Yes Historical Provider, MD   levothyroxine (Synthroid, Levoxyl) 112 mcg tablet TAKE 1 TABLET (112 MCG) BY MOUTH ONCE DAILY IN THE MORNING. TAKE ON EMPTY STOMACH 24  Yes Renetta Barnett MD   lovastatin (Mevacor) 40 mg tablet Take 1 tablet (40 mg) by mouth once daily at bedtime. 25  Yes Renetta Barnett MD   MELATONIN ORAL Take 1 mg by mouth. Take as directed   Yes Historical Provider, MD   meloxicam (Mobic) 15 mg tablet TAKE 1 TABLET (15 MG) BY MOUTH ONCE DAILY. TO TAKE WITH FOOD AND 1 GLASS OF WATER 25 " 1/29/26 Yes Renetta Barnett MD   tadalafil (Cialis) 10 mg tablet Take 1 tablet (10 mg) by mouth if needed for erectile dysfunction. 5/21/24 7/24/25 Yes Faby Tejada MD   triamcinolone (Nasacort) 55 mcg nasal inhaler Administer into affected nostril(s).   Yes Historical Provider, MD   chlorhexidine (Peridex) 0.12 % solution Swish for 30 seconds and spit 15mL of solution the night before and morning of surgery 7/24/25   Yanet Jaramillo PA-C   desoximetasone (Topicort) 0.25 % cream Desoximetasone 0.25 % External Cream APPLY AND GENTLY MASSAGE INTO AFFECTED AREA(S) TWICE DAILY. as needed Refills: 0 Start : 26-Oct-2016 Active 10/26/16   Historical Provider, MD   hydrocortisone 1 % ointment Apply topically if needed.    Historical Provider, MD   neomycin-polymyxin-pramoxine (Neosporin) 3.5-10,000-10 mg-unit-mg/gram cream Apply topically.    Historical Provider, MD BLADIMIR RUDOLPH     DASI Risk Score      Flowsheet Row Questionnaire Series Submission from 6/24/2025 in [facility] OR with Generic Provider Mycjose Pre-Admission Testing from 8/12/2024 in [facility]   Can you take care of yourself (eat, dress, bathe, or use toilet)?  2.75  filed at 06/24/2025 2150 2.75 filed at 08/12/2024 1302   Can you walk indoors, such as around your house? 1.75  filed at 06/24/2025 2150 1.75 filed at 08/12/2024 1302   Can you walk a block or two on level ground?  2.75  filed at 06/24/2025 2150 2.75 filed at 08/12/2024 1302   Can you climb a flight of stairs or walk up a hill? 5.5  filed at 06/24/2025 2150 5.5 filed at 08/12/2024 1302   Can you run a short distance? 0  filed at 06/24/2025 2150 8 filed at 08/12/2024 1302   Can you do light work around the house like dusting or washing dishes? 2.7  filed at 06/24/2025 2150 2.7 filed at 08/12/2024 1302   Can you do moderate work around the house like vacuuming, sweeping floors or carrying groceries? 3.5  filed at 06/24/2025 2150 3.5 filed at 08/12/2024 1302    Can you do heavy work around the house like scrubbing floors or lifting and moving heavy furniture?  0  filed at 06/24/2025 2150 8 filed at 08/12/2024 1302   Can you do yard work like raking leaves, weeding or pushing a mower? 0  filed at 06/24/2025 2150 4.5 filed at 08/12/2024 1302   Can you have sexual relations? 5.25  filed at 06/24/2025 2150 5.25 filed at 08/12/2024 1302   Can you participate in moderate recreational activities like golf, bowling, dancing, doubles tennis or throwing a baseball or football? 0  filed at 06/24/2025 2150 0 filed at 08/12/2024 1302   Can you participate in strenous sports like swimming, singles tennis, football, basketball, or skiing? 0  filed at 06/24/2025 2150 0 filed at 08/12/2024 1302   DASI SCORE 24.2  filed at 06/24/2025 2150 44.7 filed at 08/12/2024 1302   METS Score (Will be calculated only when all the questions are answered) 5.7  filed at 06/24/2025 2150 8.2 filed at 08/12/2024 1302     Caprini DVT Assessment    No data to display  Modified Frailty Index    No data to display  [id number] Stroke Risk Points  Current as of just now        3 0 to 9 Points:      No Change          The [id number] risk score (Lip SEAMUS, et al. 2009. © 2010 American [employer]) quantifies the risk of stroke for a patient with atrial fibrillation. For patients without atrial fibrillation or under the age of 18 this score appears as N/A. Higher score values generally indicate higher risk of stroke.          Points Metrics   0 Has Congestive Heart Failure:  No     Patients with congestive heart failure get 1 point.    Current as of just now   1 Has Hypertension:  Yes     Patients with hypertension get 1 point.    Current as of just now   1 Age:  75     Patients 65 to 74 years old get 1 point, or patients 75 years and older get 2 points.    Current as of just now   0 Has Diabetes:  No     Patients with diabetes get 1 point.    Current as of just now   0 Had Stroke:  No  Had TIA:   No  Had Thromboembolism:  No     Patients who have had a stroke, TIA, or thromboembolism get 2 points.    Current as of just now   1 Has Vascular Disease:  Yes     Patients with vascular disease get 1 point.    Current as of just now   0 Clinically Relevant Sex:  Male     Patients with a clinically relevant sex of Female get 1 point.    Current as of just now             Revised Cardiac Risk Index    No data to display  Apfel Simplified Score    No data to display  Risk Analysis Index Results This Encounter    No data found in the last 10 encounters.       Stop Bang Score      Flowsheet Row Pre-Admission Testing from 8/12/2024 in [facility]   Do you snore loudly? 1 filed at 08/12/2024 1303   Do you often feel tired or fatigued after your sleep? 0 filed at 08/12/2024 1303   Has anyone ever observed you stop breathing in your sleep? 0 filed at 08/12/2024 1303   Do you have or are you being treated for high blood pressure? 1 filed at 08/12/2024 1303   Recent BMI (Calculated) 31.9 filed at 08/12/2024 1303   Is BMI greater than 35 kg/m2? 0=No filed at 08/12/2024 1303   Age older than 50 years old? 1=Yes filed at 08/12/2024 1303   Is your neck circumference greater than 17 inches (Male) or 16 inches (Female)? 0 filed at 08/12/2024 1303   Gender - Male 1=Yes filed at 08/12/2024 1303   STOP-BANG Total Score 4 filed at 08/12/2024 1303     Prodigy: High Risk  Total Score: 20              Prodigy Age Score      Prodigy Gender Score          ARISCAT Score for Postoperative Pulmonary Complications      Flowsheet Row Pre-Admission Testing from 7/24/2025 in [facility] with Yanet Jaramillo PA-C   Age Calculated Score 3 filed at 07/24/2025 1317   Preoperative SpO2 0 filed at 07/24/2025 1317   Respiratory infection in the last month Either upper or lower (i.e., URI, bronchitis, pneumonia), with fever and antibiotic treatment 0 filed at 07/24/2025 1317   Preoperative anemia (Hgb less than 10 g/dl) 0  filed at 07/24/2025 1317   Surgical incision  0 filed at 07/24/2025 1317   Duration of surgery  16 filed at 07/24/2025 1317   Emergency Procedure  0 filed at 07/24/2025 1317   ARISCAT Total Score  19 filed at 07/24/2025 1317     Paolo Perioperative Risk for Myocardial Infarction or Cardiac Arrest ([place])    No data to display      Nurse Plan of Action:     After Visit Summary (AVS) reviewed and patient verbalized good understanding of medications and NPO instructions.  Pre-op infection prevention measures:  CHG showers and mouthwash reviewed, understanding voiced.  CHG soap given and patient verbalized need to pick CHG mouthwash at their preferred local pharmacy.              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    Erectile dysfunction     GERD (gastroesophageal reflux disease)     Hyperlipidemia     Hypertension     Hypothyroidism     Obesity     Peripheral neuropathy September 2025    Sleep apnea     CPAP   [2]   Past Surgical History:  Procedure Laterality Date    APPENDECTOMY  03/03/2015    Appendectomy    BACK SURGERY      CIRCUMCISION, PRIMARY      COLONOSCOPY  03/03/2015    Complete Colonoscopy    FLEXIBLE SIGMOIDOSCOPY      HERNIA REPAIR  03/03/2015    Hernia Repair    LUMBAR LAMINECTOMY  2002    OTHER SURGICAL HISTORY  02/17/2017    Laminectomy Decompressive Up To Two Lumbar Segments    OTHER SURGICAL HISTORY  04/08/2021    Back surgery    PROSTATE SURGERY  2024    HoLEP    SINUS SURGERY  03/03/2015    Sinus Surgery    THYROID SURGERY      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    TONSILLECTOMY  03/03/2015    Tonsillectomy    TOTAL THYROIDECTOMY  01/05/2022    Thyroid Surgery Total  Thyroidectomy    UPPER GASTROINTESTINAL ENDOSCOPY     [3]   Family History  Problem Relation Name Age of Onset    Breast cancer Mother      Other (mesothelioma) Father Dilshad Sr     Cancer Father Dilshad Sr     Breast cancer Sister      No Known Problems Sister      No Known Problems Sister      Cancer Brother     [4]   Allergies  Allergen Reactions    Sulfamethoxazole-Trimethoprim Anaphylaxis     patient states throat swells    Other Other     Mildew    Griseofulvin Itching     antifungals    Pollen Extracts Other     sneezing

## 2025-07-25 LAB
EST. AVERAGE GLUCOSE BLD GHB EST-MCNC: 111 MG/DL
HBA1C MFR BLD: 5.5 % (ref ?–5.7)

## 2025-07-26 LAB — STAPHYLOCOCCUS SPEC CULT: NORMAL

## 2025-07-30 ENCOUNTER — HOME HEALTH ADMISSION (OUTPATIENT)
Dept: HOME HEALTH SERVICES | Facility: HOME HEALTH | Age: 75
End: 2025-07-30
Payer: MEDICARE

## 2025-07-31 ENCOUNTER — PHARMACY VISIT (OUTPATIENT)
Dept: PHARMACY | Facility: CLINIC | Age: 75
End: 2025-07-31
Payer: MEDICARE

## 2025-07-31 ENCOUNTER — ANESTHESIA EVENT (OUTPATIENT)
Dept: OPERATING ROOM | Facility: HOSPITAL | Age: 75
End: 2025-07-31
Payer: MEDICARE

## 2025-07-31 ENCOUNTER — APPOINTMENT (OUTPATIENT)
Dept: RADIOLOGY | Facility: HOSPITAL | Age: 75
End: 2025-07-31
Payer: MEDICARE

## 2025-07-31 ENCOUNTER — HOSPITAL ENCOUNTER (OUTPATIENT)
Facility: HOSPITAL | Age: 75
Setting detail: OUTPATIENT SURGERY
Discharge: HOME HEALTH | End: 2025-07-31
Attending: STUDENT IN AN ORGANIZED HEALTH CARE EDUCATION/TRAINING PROGRAM | Admitting: STUDENT IN AN ORGANIZED HEALTH CARE EDUCATION/TRAINING PROGRAM
Payer: MEDICARE

## 2025-07-31 ENCOUNTER — ANESTHESIA (OUTPATIENT)
Dept: OPERATING ROOM | Facility: HOSPITAL | Age: 75
End: 2025-07-31
Payer: MEDICARE

## 2025-07-31 ENCOUNTER — DOCUMENTATION (OUTPATIENT)
Dept: HOME HEALTH SERVICES | Facility: HOME HEALTH | Age: 75
End: 2025-07-31
Payer: MEDICARE

## 2025-07-31 VITALS
SYSTOLIC BLOOD PRESSURE: 149 MMHG | RESPIRATION RATE: 17 BRPM | DIASTOLIC BLOOD PRESSURE: 91 MMHG | BODY MASS INDEX: 30.67 KG/M2 | WEIGHT: 209.44 LBS | HEART RATE: 79 BPM | TEMPERATURE: 96.1 F | OXYGEN SATURATION: 99 %

## 2025-07-31 DIAGNOSIS — M16.12 UNILATERAL PRIMARY OSTEOARTHRITIS, LEFT HIP: Primary | ICD-10-CM

## 2025-07-31 PROBLEM — M19.90 OA (OSTEOARTHRITIS): Status: ACTIVE | Noted: 2025-07-31

## 2025-07-31 PROCEDURE — 97530 THERAPEUTIC ACTIVITIES: CPT | Mod: GP

## 2025-07-31 PROCEDURE — 97116 GAIT TRAINING THERAPY: CPT | Mod: GP

## 2025-07-31 PROCEDURE — 2500000004 HC RX 250 GENERAL PHARMACY W/ HCPCS (ALT 636 FOR OP/ED): Performed by: ANESTHESIOLOGY

## 2025-07-31 PROCEDURE — 27130 TOTAL HIP ARTHROPLASTY: CPT | Performed by: STUDENT IN AN ORGANIZED HEALTH CARE EDUCATION/TRAINING PROGRAM

## 2025-07-31 PROCEDURE — A4649 SURGICAL SUPPLIES: HCPCS | Performed by: STUDENT IN AN ORGANIZED HEALTH CARE EDUCATION/TRAINING PROGRAM

## 2025-07-31 PROCEDURE — 7100000010 HC PHASE TWO TIME - EACH INCREMENTAL 1 MINUTE: Performed by: STUDENT IN AN ORGANIZED HEALTH CARE EDUCATION/TRAINING PROGRAM

## 2025-07-31 PROCEDURE — 2500000005 HC RX 250 GENERAL PHARMACY W/O HCPCS: Performed by: NURSE ANESTHETIST, CERTIFIED REGISTERED

## 2025-07-31 PROCEDURE — 7100000002 HC RECOVERY ROOM TIME - EACH INCREMENTAL 1 MINUTE: Performed by: STUDENT IN AN ORGANIZED HEALTH CARE EDUCATION/TRAINING PROGRAM

## 2025-07-31 PROCEDURE — 97161 PT EVAL LOW COMPLEX 20 MIN: CPT | Mod: GP

## 2025-07-31 PROCEDURE — 72170 X-RAY EXAM OF PELVIS: CPT

## 2025-07-31 PROCEDURE — 3700000002 HC GENERAL ANESTHESIA TIME - EACH INCREMENTAL 1 MINUTE: Performed by: STUDENT IN AN ORGANIZED HEALTH CARE EDUCATION/TRAINING PROGRAM

## 2025-07-31 PROCEDURE — 2500000001 HC RX 250 WO HCPCS SELF ADMINISTERED DRUGS (ALT 637 FOR MEDICARE OP): Performed by: ANESTHESIOLOGY

## 2025-07-31 PROCEDURE — 76000 FLUOROSCOPY <1 HR PHYS/QHP: CPT | Mod: LT

## 2025-07-31 PROCEDURE — 3700000001 HC GENERAL ANESTHESIA TIME - INITIAL BASE CHARGE: Performed by: STUDENT IN AN ORGANIZED HEALTH CARE EDUCATION/TRAINING PROGRAM

## 2025-07-31 PROCEDURE — C1776 JOINT DEVICE (IMPLANTABLE): HCPCS | Performed by: STUDENT IN AN ORGANIZED HEALTH CARE EDUCATION/TRAINING PROGRAM

## 2025-07-31 PROCEDURE — 72170 X-RAY EXAM OF PELVIS: CPT | Performed by: RADIOLOGY

## 2025-07-31 PROCEDURE — 2500000002 HC RX 250 W HCPCS SELF ADMINISTERED DRUGS (ALT 637 FOR MEDICARE OP, ALT 636 FOR OP/ED): Performed by: ANESTHESIOLOGY

## 2025-07-31 PROCEDURE — C1713 ANCHOR/SCREW BN/BN,TIS/BN: HCPCS | Performed by: STUDENT IN AN ORGANIZED HEALTH CARE EDUCATION/TRAINING PROGRAM

## 2025-07-31 PROCEDURE — 2780000003 HC OR 278 NO HCPCS: Performed by: STUDENT IN AN ORGANIZED HEALTH CARE EDUCATION/TRAINING PROGRAM

## 2025-07-31 PROCEDURE — 3600000017 HC OR TIME - EACH INCREMENTAL 1 MINUTE - PROCEDURE LEVEL SIX: Performed by: STUDENT IN AN ORGANIZED HEALTH CARE EDUCATION/TRAINING PROGRAM

## 2025-07-31 PROCEDURE — RXMED WILLOW AMBULATORY MEDICATION CHARGE

## 2025-07-31 PROCEDURE — 7100000009 HC PHASE TWO TIME - INITIAL BASE CHARGE: Performed by: STUDENT IN AN ORGANIZED HEALTH CARE EDUCATION/TRAINING PROGRAM

## 2025-07-31 PROCEDURE — 97110 THERAPEUTIC EXERCISES: CPT | Mod: GP

## 2025-07-31 PROCEDURE — 2700000047 HC OR 270 NO HCPCS: Performed by: STUDENT IN AN ORGANIZED HEALTH CARE EDUCATION/TRAINING PROGRAM

## 2025-07-31 PROCEDURE — 2500000004 HC RX 250 GENERAL PHARMACY W/ HCPCS (ALT 636 FOR OP/ED): Performed by: NURSE ANESTHETIST, CERTIFIED REGISTERED

## 2025-07-31 PROCEDURE — 2500000005 HC RX 250 GENERAL PHARMACY W/O HCPCS: Performed by: ANESTHESIOLOGY

## 2025-07-31 PROCEDURE — 3600000018 HC OR TIME - INITIAL BASE CHARGE - PROCEDURE LEVEL SIX: Performed by: STUDENT IN AN ORGANIZED HEALTH CARE EDUCATION/TRAINING PROGRAM

## 2025-07-31 PROCEDURE — 7100000001 HC RECOVERY ROOM TIME - INITIAL BASE CHARGE: Performed by: STUDENT IN AN ORGANIZED HEALTH CARE EDUCATION/TRAINING PROGRAM

## 2025-07-31 PROCEDURE — 2720000007 HC OR 272 NO HCPCS: Performed by: STUDENT IN AN ORGANIZED HEALTH CARE EDUCATION/TRAINING PROGRAM

## 2025-07-31 PROCEDURE — 7100000011 HC EXTENDED STAY RECOVERY HOURLY - NURSING UNIT

## 2025-07-31 PROCEDURE — 2500000005 HC RX 250 GENERAL PHARMACY W/O HCPCS: Performed by: STUDENT IN AN ORGANIZED HEALTH CARE EDUCATION/TRAINING PROGRAM

## 2025-07-31 DEVICE — IMPLANTABLE DEVICE: Type: IMPLANTABLE DEVICE | Site: HIP | Status: FUNCTIONAL

## 2025-07-31 DEVICE — SCREW CANCELLOUS 6.5 X 25: Type: IMPLANTABLE DEVICE | Site: HIP | Status: FUNCTIONAL

## 2025-07-31 RX ORDER — SODIUM CHLORIDE 0.9 G/100ML
INJECTION, SOLUTION IRRIGATION AS NEEDED
Status: DISCONTINUED | OUTPATIENT
Start: 2025-07-31 | End: 2025-07-31 | Stop reason: HOSPADM

## 2025-07-31 RX ORDER — PANTOPRAZOLE SODIUM 40 MG/1
40 TABLET, DELAYED RELEASE ORAL
Qty: 30 TABLET | Refills: 0 | Status: SHIPPED | OUTPATIENT
Start: 2025-07-31 | End: 2025-08-30

## 2025-07-31 RX ORDER — SCOPOLAMINE 1 MG/3D
1 PATCH, EXTENDED RELEASE TRANSDERMAL ONCE
Status: CANCELLED | OUTPATIENT
Start: 2025-07-31 | End: 2025-07-31

## 2025-07-31 RX ORDER — POLYETHYLENE GLYCOL 3350 17 G/17G
17 POWDER, FOR SOLUTION ORAL DAILY
Qty: 238 G | Refills: 0 | Status: SHIPPED | OUTPATIENT
Start: 2025-07-31

## 2025-07-31 RX ORDER — PHENYLEPHRINE HCL IN 0.9% NACL 1 MG/10 ML
SYRINGE (ML) INTRAVENOUS AS NEEDED
Status: DISCONTINUED | OUTPATIENT
Start: 2025-07-31 | End: 2025-07-31

## 2025-07-31 RX ORDER — CEFAZOLIN SODIUM 2 G/50ML
2 SOLUTION INTRAVENOUS EVERY 8 HOURS
Status: CANCELLED | OUTPATIENT
Start: 2025-07-31 | End: 2025-07-31

## 2025-07-31 RX ORDER — NALOXONE HYDROCHLORIDE 1 MG/ML
0.2 INJECTION INTRAMUSCULAR; INTRAVENOUS; SUBCUTANEOUS EVERY 5 MIN PRN
Status: CANCELLED | OUTPATIENT
Start: 2025-07-31

## 2025-07-31 RX ORDER — BUPIVACAINE HCL/EPINEPHRINE 0.5-1:200K
VIAL (ML) INJECTION AS NEEDED
Status: DISCONTINUED | OUTPATIENT
Start: 2025-07-31 | End: 2025-07-31 | Stop reason: HOSPADM

## 2025-07-31 RX ORDER — IPRATROPIUM BROMIDE 0.5 MG/2.5ML
500 SOLUTION RESPIRATORY (INHALATION) ONCE
Status: DISCONTINUED | OUTPATIENT
Start: 2025-07-31 | End: 2025-07-31 | Stop reason: HOSPADM

## 2025-07-31 RX ORDER — TRAMADOL HYDROCHLORIDE 50 MG/1
50-100 TABLET, FILM COATED ORAL EVERY 6 HOURS PRN
Qty: 40 TABLET | Refills: 0 | Status: SHIPPED | OUTPATIENT
Start: 2025-07-31 | End: 2025-08-07

## 2025-07-31 RX ORDER — ONDANSETRON HYDROCHLORIDE 2 MG/ML
4 INJECTION, SOLUTION INTRAVENOUS EVERY 8 HOURS PRN
Status: CANCELLED | OUTPATIENT
Start: 2025-07-31

## 2025-07-31 RX ORDER — NAPROXEN SODIUM 220 MG/1
81 TABLET, FILM COATED ORAL 2 TIMES DAILY
Qty: 3 TABLET | Refills: 0 | Status: SHIPPED | OUTPATIENT
Start: 2025-07-31 | End: 2025-08-02

## 2025-07-31 RX ORDER — PANTOPRAZOLE SODIUM 40 MG/1
40 TABLET, DELAYED RELEASE ORAL
Status: CANCELLED | OUTPATIENT
Start: 2025-07-31 | End: 2025-08-21

## 2025-07-31 RX ORDER — PHENYLEPHRINE 10 MG/250 ML(40 MCG/ML)IN 0.9 % SOD.CHLORIDE INTRAVENOUS
CONTINUOUS PRN
Status: DISCONTINUED | OUTPATIENT
Start: 2025-07-31 | End: 2025-07-31

## 2025-07-31 RX ORDER — HYDROMORPHONE HYDROCHLORIDE 0.2 MG/ML
0.2 INJECTION INTRAMUSCULAR; INTRAVENOUS; SUBCUTANEOUS EVERY 4 HOURS PRN
Status: CANCELLED | OUTPATIENT
Start: 2025-07-31

## 2025-07-31 RX ORDER — OXYCODONE HYDROCHLORIDE 5 MG/1
5-10 TABLET ORAL EVERY 6 HOURS PRN
Qty: 40 TABLET | Refills: 0 | Status: SHIPPED | OUTPATIENT
Start: 2025-07-31 | End: 2025-08-07

## 2025-07-31 RX ORDER — ACETAMINOPHEN 500 MG
1000 TABLET ORAL EVERY 8 HOURS
Qty: 360 TABLET | Refills: 0 | Status: SHIPPED | OUTPATIENT
Start: 2025-07-31 | End: 2025-08-04 | Stop reason: SDUPTHER

## 2025-07-31 RX ORDER — OMEPRAZOLE 20 MG/1
20 TABLET, DELAYED RELEASE ORAL
COMMUNITY
End: 2025-07-31 | Stop reason: HOSPADM

## 2025-07-31 RX ORDER — PROPOFOL 10 MG/ML
INJECTION, EMULSION INTRAVENOUS AS NEEDED
Status: DISCONTINUED | OUTPATIENT
Start: 2025-07-31 | End: 2025-07-31

## 2025-07-31 RX ORDER — DOCUSATE SODIUM 100 MG/1
100 CAPSULE, LIQUID FILLED ORAL 2 TIMES DAILY
Qty: 30 CAPSULE | Refills: 0 | Status: SHIPPED | OUTPATIENT
Start: 2025-07-31 | End: 2025-08-15

## 2025-07-31 RX ORDER — PSEUDOEPHEDRINE HCL 30 MG
30 TABLET ORAL EVERY 4 HOURS PRN
COMMUNITY

## 2025-07-31 RX ORDER — SYRING-NEEDL,DISP,INSUL,0.3 ML 29 G X1/2"
148 SYRINGE, EMPTY DISPOSABLE MISCELLANEOUS ONCE AS NEEDED
Status: CANCELLED | OUTPATIENT
Start: 2025-07-31

## 2025-07-31 RX ORDER — BISACODYL 5 MG
10 TABLET, DELAYED RELEASE (ENTERIC COATED) ORAL DAILY PRN
Status: CANCELLED | OUTPATIENT
Start: 2025-07-31

## 2025-07-31 RX ORDER — LIDOCAINE HYDROCHLORIDE 10 MG/ML
0.1 INJECTION, SOLUTION EPIDURAL; INFILTRATION; INTRACAUDAL; PERINEURAL ONCE
Status: DISCONTINUED | OUTPATIENT
Start: 2025-07-31 | End: 2025-07-31 | Stop reason: HOSPADM

## 2025-07-31 RX ORDER — SODIUM CHLORIDE, SODIUM LACTATE, POTASSIUM CHLORIDE, CALCIUM CHLORIDE 600; 310; 30; 20 MG/100ML; MG/100ML; MG/100ML; MG/100ML
100 INJECTION, SOLUTION INTRAVENOUS CONTINUOUS
Status: ACTIVE | OUTPATIENT
Start: 2025-07-31 | End: 2025-07-31

## 2025-07-31 RX ORDER — OXYCODONE HYDROCHLORIDE 5 MG/1
5 TABLET ORAL EVERY 4 HOURS PRN
Status: DISCONTINUED | OUTPATIENT
Start: 2025-07-31 | End: 2025-07-31 | Stop reason: HOSPADM

## 2025-07-31 RX ORDER — DROPERIDOL 2.5 MG/ML
0.62 INJECTION, SOLUTION INTRAMUSCULAR; INTRAVENOUS ONCE AS NEEDED
Status: DISCONTINUED | OUTPATIENT
Start: 2025-07-31 | End: 2025-07-31 | Stop reason: HOSPADM

## 2025-07-31 RX ORDER — OXYCODONE HYDROCHLORIDE 5 MG/1
10 TABLET ORAL EVERY 4 HOURS PRN
Refills: 0 | Status: CANCELLED | OUTPATIENT
Start: 2025-07-31

## 2025-07-31 RX ORDER — ALBUTEROL SULFATE 0.83 MG/ML
2.5 SOLUTION RESPIRATORY (INHALATION) ONCE AS NEEDED
Status: COMPLETED | OUTPATIENT
Start: 2025-07-31 | End: 2025-07-31

## 2025-07-31 RX ORDER — CEFADROXIL 500 MG/1
500 CAPSULE ORAL 2 TIMES DAILY
Qty: 14 CAPSULE | Refills: 0 | Status: SHIPPED | OUTPATIENT
Start: 2025-07-31 | End: 2025-08-07

## 2025-07-31 RX ORDER — KETOROLAC TROMETHAMINE 30 MG/ML
15 INJECTION, SOLUTION INTRAMUSCULAR; INTRAVENOUS EVERY 6 HOURS
Status: CANCELLED | OUTPATIENT
Start: 2025-07-31 | End: 2025-08-01

## 2025-07-31 RX ORDER — ONDANSETRON 4 MG/1
4 TABLET, FILM COATED ORAL EVERY 8 HOURS PRN
Qty: 20 TABLET | Refills: 0 | Status: SHIPPED | OUTPATIENT
Start: 2025-07-31

## 2025-07-31 RX ORDER — LIDOCAINE HYDROCHLORIDE 40 MG/ML
SOLUTION TOPICAL AS NEEDED
Status: DISCONTINUED | OUTPATIENT
Start: 2025-07-31 | End: 2025-07-31

## 2025-07-31 RX ORDER — SODIUM CHLORIDE, SODIUM LACTATE, POTASSIUM CHLORIDE, CALCIUM CHLORIDE 600; 310; 30; 20 MG/100ML; MG/100ML; MG/100ML; MG/100ML
50 INJECTION, SOLUTION INTRAVENOUS CONTINUOUS
Status: CANCELLED | OUTPATIENT
Start: 2025-07-31 | End: 2025-08-01

## 2025-07-31 RX ORDER — ASPIRIN 81 MG/1
81 TABLET ORAL 2 TIMES DAILY
Status: CANCELLED | OUTPATIENT
Start: 2025-07-31

## 2025-07-31 RX ORDER — ONDANSETRON 4 MG/1
4 TABLET, FILM COATED ORAL EVERY 8 HOURS PRN
Status: CANCELLED | OUTPATIENT
Start: 2025-07-31

## 2025-07-31 RX ORDER — MIDAZOLAM HYDROCHLORIDE 2 MG/2ML
INJECTION, SOLUTION INTRAMUSCULAR; INTRAVENOUS AS NEEDED
Status: DISCONTINUED | OUTPATIENT
Start: 2025-07-31 | End: 2025-07-31

## 2025-07-31 RX ORDER — POLYETHYLENE GLYCOL 3350 17 G/17G
17 POWDER, FOR SOLUTION ORAL DAILY
Status: CANCELLED | OUTPATIENT
Start: 2025-07-31

## 2025-07-31 RX ORDER — CEFAZOLIN 1 G/1
INJECTION, POWDER, FOR SOLUTION INTRAVENOUS AS NEEDED
Status: DISCONTINUED | OUTPATIENT
Start: 2025-07-31 | End: 2025-07-31

## 2025-07-31 RX ORDER — TRANEXAMIC ACID 1 G/10ML
INJECTION, SOLUTION INTRAVENOUS AS NEEDED
Status: DISCONTINUED | OUTPATIENT
Start: 2025-07-31 | End: 2025-07-31

## 2025-07-31 RX ORDER — ONDANSETRON HYDROCHLORIDE 2 MG/ML
4 INJECTION, SOLUTION INTRAVENOUS ONCE AS NEEDED
Status: COMPLETED | OUTPATIENT
Start: 2025-07-31 | End: 2025-07-31

## 2025-07-31 RX ORDER — ACETAMINOPHEN 325 MG/1
650 TABLET ORAL EVERY 4 HOURS PRN
Status: CANCELLED | OUTPATIENT
Start: 2025-07-31

## 2025-07-31 RX ORDER — ACETAMINOPHEN 325 MG/1
650 TABLET ORAL EVERY 4 HOURS PRN
Status: DISCONTINUED | OUTPATIENT
Start: 2025-07-31 | End: 2025-07-31 | Stop reason: HOSPADM

## 2025-07-31 RX ADMIN — MIDAZOLAM HYDROCHLORIDE 1 MG: 1 INJECTION, SOLUTION INTRAMUSCULAR; INTRAVENOUS at 09:09

## 2025-07-31 RX ADMIN — SODIUM CHLORIDE, POTASSIUM CHLORIDE, SODIUM LACTATE AND CALCIUM CHLORIDE: 600; 310; 30; 20 INJECTION, SOLUTION INTRAVENOUS at 08:19

## 2025-07-31 RX ADMIN — PROPOFOL 30 MG: 10 INJECTION, EMULSION INTRAVENOUS at 09:20

## 2025-07-31 RX ADMIN — Medication 200 MCG: at 10:21

## 2025-07-31 RX ADMIN — MEPIVACAINE HYDROCHLORIDE 4 ML: 15 INJECTION, SOLUTION EPIDURAL; INFILTRATION at 09:17

## 2025-07-31 RX ADMIN — ALBUTEROL SULFATE 2.5 MG: 2.5 SOLUTION RESPIRATORY (INHALATION) at 11:34

## 2025-07-31 RX ADMIN — HYDROMORPHONE HYDROCHLORIDE 0.5 MG: 1 INJECTION, SOLUTION INTRAMUSCULAR; INTRAVENOUS; SUBCUTANEOUS at 13:26

## 2025-07-31 RX ADMIN — PHENYLEPHRINE-NACL IV SOLUTION 10 MG/250ML-0.9% 0.4 MCG/KG/MIN: 10-0.9/25 SOLUTION at 10:21

## 2025-07-31 RX ADMIN — LIDOCAINE HYDROCHLORIDE 4 ML: 40 SOLUTION TOPICAL at 09:54

## 2025-07-31 RX ADMIN — Medication: at 11:24

## 2025-07-31 RX ADMIN — Medication 100 MCG: at 09:34

## 2025-07-31 RX ADMIN — Medication 200 MCG: at 10:04

## 2025-07-31 RX ADMIN — ACETAMINOPHEN 650 MG: 325 TABLET ORAL at 16:35

## 2025-07-31 RX ADMIN — Medication 100 MCG: at 10:31

## 2025-07-31 RX ADMIN — Medication 200 MCG: at 09:43

## 2025-07-31 RX ADMIN — ONDANSETRON 4 MG: 2 INJECTION, SOLUTION INTRAMUSCULAR; INTRAVENOUS at 15:07

## 2025-07-31 RX ADMIN — OXYCODONE HYDROCHLORIDE 5 MG: 5 TABLET ORAL at 13:16

## 2025-07-31 RX ADMIN — HYDROMORPHONE HYDROCHLORIDE 0.5 MG: 1 INJECTION, SOLUTION INTRAMUSCULAR; INTRAVENOUS; SUBCUTANEOUS at 12:22

## 2025-07-31 RX ADMIN — MIDAZOLAM HYDROCHLORIDE 1 MG: 1 INJECTION, SOLUTION INTRAMUSCULAR; INTRAVENOUS at 09:12

## 2025-07-31 RX ADMIN — PROPOFOL 100 MCG/KG/MIN: 10 INJECTION, EMULSION INTRAVENOUS at 09:20

## 2025-07-31 RX ADMIN — CEFAZOLIN 2 G: 330 INJECTION, POWDER, FOR SOLUTION INTRAMUSCULAR; INTRAVENOUS at 09:16

## 2025-07-31 RX ADMIN — HYDROMORPHONE HYDROCHLORIDE 0.5 MG: 1 INJECTION, SOLUTION INTRAMUSCULAR; INTRAVENOUS; SUBCUTANEOUS at 12:45

## 2025-07-31 RX ADMIN — Medication 100 MCG: at 09:53

## 2025-07-31 RX ADMIN — TRANEXAMIC ACID 1000 MG: 100 INJECTION, SOLUTION INTRAVENOUS at 09:16

## 2025-07-31 ASSESSMENT — ACTIVITIES OF DAILY LIVING (ADL): ADL_ASSISTANCE: INDEPENDENT

## 2025-07-31 ASSESSMENT — PAIN SCALES - GENERAL
PAINLEVEL_OUTOF10: 3
PAINLEVEL_OUTOF10: 4
PAINLEVEL_OUTOF10: 7
PAINLEVEL_OUTOF10: 6
PAINLEVEL_OUTOF10: 7
PAINLEVEL_OUTOF10: 0 - NO PAIN
PAINLEVEL_OUTOF10: 6
PAINLEVEL_OUTOF10: 3
PAINLEVEL_OUTOF10: 5 - MODERATE PAIN
PAINLEVEL_OUTOF10: 0 - NO PAIN
PAINLEVEL_OUTOF10: 4
PAINLEVEL_OUTOF10: 4
PAINLEVEL_OUTOF10: 5 - MODERATE PAIN
PAINLEVEL_OUTOF10: 5 - MODERATE PAIN
PAINLEVEL_OUTOF10: 2
PAINLEVEL_OUTOF10: 0 - NO PAIN
PAINLEVEL_OUTOF10: 2
PAINLEVEL_OUTOF10: 7
PAINLEVEL_OUTOF10: 3
PAINLEVEL_OUTOF10: 4
PAINLEVEL_OUTOF10: 6
PAINLEVEL_OUTOF10: 4

## 2025-07-31 ASSESSMENT — PAIN - FUNCTIONAL ASSESSMENT
PAIN_FUNCTIONAL_ASSESSMENT: 0-10
PAIN_FUNCTIONAL_ASSESSMENT: UNABLE TO SELF-REPORT
PAIN_FUNCTIONAL_ASSESSMENT: 0-10

## 2025-07-31 ASSESSMENT — COGNITIVE AND FUNCTIONAL STATUS - GENERAL
STANDING UP FROM CHAIR USING ARMS: A LITTLE
TURNING FROM BACK TO SIDE WHILE IN FLAT BAD: A LITTLE
MOVING TO AND FROM BED TO CHAIR: A LITTLE
MOBILITY SCORE: 18
MOVING FROM LYING ON BACK TO SITTING ON SIDE OF FLAT BED WITH BEDRAILS: A LITTLE
WALKING IN HOSPITAL ROOM: A LITTLE
CLIMB 3 TO 5 STEPS WITH RAILING: A LITTLE

## 2025-07-31 ASSESSMENT — COLUMBIA-SUICIDE SEVERITY RATING SCALE - C-SSRS
6. HAVE YOU EVER DONE ANYTHING, STARTED TO DO ANYTHING, OR PREPARED TO DO ANYTHING TO END YOUR LIFE?: NO
1. IN THE PAST MONTH, HAVE YOU WISHED YOU WERE DEAD OR WISHED YOU COULD GO TO SLEEP AND NOT WAKE UP?: NO
2. HAVE YOU ACTUALLY HAD ANY THOUGHTS OF KILLING YOURSELF?: NO

## 2025-07-31 ASSESSMENT — PAIN DESCRIPTION - LOCATION: LOCATION: HIP

## 2025-07-31 ASSESSMENT — PAIN DESCRIPTION - ORIENTATION: ORIENTATION: LEFT

## 2025-07-31 ASSESSMENT — PAIN DESCRIPTION - DESCRIPTORS: DESCRIPTORS: ACHING

## 2025-07-31 NOTE — OP NOTE
Op Note    Preoperative Diagnosis: Left hip arthritis, end-stage    Postoperative Diagnosis: Same    PROCEDURE:   Left total hip arthroplasty, anterior approach    Surgeon:  Jazzy Barber MD     First Assist:  MD Mario, PGY1  SA Abdoul    Anesthesia Staff:  Anesthesiologist: Antonio Johnson MD  CRNA: Krystin Mcelroy APRN-AMI    Anesthesia Type: Spinal     Specimens:  No specimens collected    Estimated Blood Loss:  250 mL    Implants:  Implant Name Type Inv. Item Serial No.  Lot No. LRB No. Used Action   EMPHASYS POLYETHYLENE LINER NEUTRAL AOX 56-58mm 40mm   N/A DEPUY [id number] Left 1 Implanted   EMPHASYS ACETABULAR SHELL THREE-HOLE 56mm CEMENTLESS   N/A DEPUY [id number] Left 1 Implanted   SCREW CANCELLOUS 6.5 X 25 - SN/A - [id number] Screw SCREW CANCELLOUS 6.5 X 25 N/A DEPUY [id number] Left 1 Implanted   ACTIS DUOFIX HIP PROSTHESIS FEMORAL STEM 12/14 TAPER CEMENTLESS SIZE 8 HIGH COLLAR Stem  N/A DEPUY [id number] Left 1 Implanted   HEAD, FEMORAL, DELTA TS CERAMIC 12/14, 40MM +5.0 - SN/A - [id number] Joint HEAD, FEMORAL, DELTA TS CERAMIC 12/14, 40MM +5.0 N/A DEPUY [id number] Left 1 Implanted        Findings: End-stage arthritis of left hip.  Large pincer deformity.  Moderate cam deformity.  [place] femur.    Clinical History:  75-year-old male with hip degenerative joint disease who failed conservative measures and treatment, and wished to undergo a total hip arthroplasty electively.  Patient has been pending partial nephrectomy and was cleared to proceed with total hip arthroplasty first by managing urologist, Dr. De Anda.  The patient at this time was explained the risks and benefits of the surgery including infection, bleeding, damage to neurovascular structures and potential for continued pain, continued weakness, worsening pain and worsening weakness, stiffness, stability, limb length discrepancy, fracture, infection requiring the resection arthroplasty and the need for revision surgery in the future, DVT  prophylaxis and the risks for anesthetic complications and pulmonary complications and even death. The patient understood at this point under the risks and wished to proceed.    Description of procedure:  The patient was seen in the preoperative holding area and the appropriate side lower extremity was marked. The patient was brought to the Operating Room and placed supine on the Operating Room table. A preliminary anesthesia time out was then taken.    The neuraxial block anesthesia was initiated. The patient was placed supine. The antibiotics were administered within thirty minutes of incision time. The patient was then placed on the HANA table with the bilateral lower extremities in traction boots and the body stabilized against the perineal post.  At this point, the patient's surgical area was prepped and draped in the standard, sterile fashion.    A surgical time out was performed to confirm the correct side, procedure, and name. The patient received IV antibiotics and IV TXA. An approximate 8 cm longitudinal incision was made starting superolateral and distal to anterior, superior iliac spine. With a slight proximal medial to distal lateral angulation, the incision was carried down through the subcutaneous tissues down to the level of the tensor fascia haley. With the Brenden retractors in place, the tensor fascia haley was divided. Blunt dissection out of the fascia was done to identify the interval between the tensor fascia haley and the sartorius. An aufranc was placed proximally on the ilium. Once this was done with deep Brenden retractors, blunt dissection was used to identify the ascending branch of the lateral femoral circumflex artery and this was cauterized.    Next, the fat pad between the gluteus medius and the rectus was identified and dissected to the deep interval. At this point, a Christopher was used to elevate the iliocapsularis off the anterior capsule and an Aufranc retractor was placed along the medial  neck. The reflected head of the rectus femoris was identified and preserved. At this point, a capsulotomy was performed along the axis of iliocapsularis and then along the superior border of lateralis and then proximally along the intertrochanteric ridge to the saddle of the femoral neck. The capsule was tagged with two # 2 ethibond sutures. The Aufranc retractors were now placed intracapsular and the head-neck junction was exposed.  Under direct visualization, a subcapital osteotomy was performed with a sagittal saw and the head was removed with a corkscrew.  The capsule was then released posterosuperiorly being careful to identify the interval between minimus and capsule and releasing around the superior capsular insertion from the femur.     The acetabulum was now visualized with the placement of retractors: Aufranc retractor over the posterior acetabulum, a C-retractor over anterior acetabulum. An episiotomy was made in the inferior capsule and straight Homann placed inferior, posterior acetabulum. At this point, the bovie was used to excise the labrum and the cotyloid fossa.     At this point, with a reamer, the acetabulum was medialized and then deepened sequentially up to the final size reamer. Fluoroscopy was used to assess the position of the reamer. Next, the final 56 mm acetabular component was impacted into place. This was done with the assistance of fluoroscopic guidance and position was confirmed anatomically. A 25 mm screw was placed for supplemental fixation.  Once this was accomplished, the final neutral 40 mm polyethylene liner was impacted into place. The osteophytes were removed using an osteotome.     Next, the attention was turned to the femur. The acetabular retractors were removed. A HANA hook was placed around the proximal femur at the level of the G max insertion and lesser trochanter. The curved pointed Hohmann was placed over the greater trochanter laterally and a femoral retractor placed  at the inferomedial neck. The conjoint tendon was visualized and released with the bovie cautery to expose the inner aspect of the greater trochanter. The piriformis and obturator externus tendons were preserved. The leg was then hyperextended and adducted slightly.  With this done, the midas silvia followed by canal finder was used to access in the intramedullary canal. This was followed by sequential broaching up to the final size consistent with the preoperative template. With the broach left in place and after calcar planning, the neck and trials were placed. The hip was reduced and found to be stable. Leg length, offset and implant size and position were assessed clinically and radiographically, and these were found to be consistent with the preoperative template.     Afterwards, the hip was dislocated. The trial components were removed, and the final size stem was placed in the canal and impacted into place and was found to be stable. The actual 40+5 mm head was now impacted into place after cleaning and drying the trunnion. The hip was reduced with gentle traction and internal rotation. Final leg length was measured on X-ray.    At this point, the hip was copiously irrigated. A periarticular injection was performed. Then the tagging ethibond sutures were tied together to reapproximate the capsulotomy and a few #2 ethibonds were used to complete closure of the capsulotomy. Copious irrigation was used throughout the closure.  The fascia overlying the TFL was now closed using 0 vicryl sutures. The remainder of the wound was closed in layers using 0-vicryl, 3-0 vicryl and skin closed using 3-0 stratafix and dermabond for skin. Sterile mepilex dressings were applied and the patient was transferred from the operating room table to the bed and then transferred to the Recovery Room in stable condition. All sponge, needle, and instrument counts were correct at the end of the procedure. There were no complications. A  surgical debrief was performed at the end of the case.    A 22 modifier is being added to this case for increased complexity secondary to patient BMI >30.  This led to increased surgical time, need for additional help in the operating room, and additional retractor use as compared to a standard arthroplasty procedure.    Post-operative Plan:  Patient will be placed on anterior hip precautions and will mobilize with physical therapy. They will be started on aspirin 81 mg twice daily for 3 postoperative doses, with transition to 50% home dose Eliquis on postoperative day 2 morning as long as no concerns for bleeding for DVT prophylaxis along with mechanical compression devices. They will be discharged from the hospital when they have reached rehabilitation goals and their pain is controlled.    Attestation Statement:  I was present for and performed all critical portions of the procedure.    Jazzy Barber MD    This note was transcribed with dictation software.  Please excuse any typographical errors, program misunderstandings leading to inadvertent insertions or deletions of inappropriate wording, pronoun errors and other unintentional transcription errors not noticed on proof-reading.

## 2025-07-31 NOTE — ANESTHESIA PROCEDURE NOTES
Spinal Block    Patient location during procedure: OR  Start time: 7/31/2025 9:02 AM  End time: 7/31/2025 9:17 AM  Reason for block: primary anesthetic  Staffing  Performed: CRNA   Authorized by: Antonio Johnson MD    Performed by: BILLY Faulkner    Preanesthetic Checklist  Completed: patient identified, IV checked, site marked, risks and benefits discussed, surgical consent, monitors and equipment checked, pre-op evaluation, timeout performed and sterile techniques followed  Block Timeout  RN/Licensed healthcare professional reads aloud to the Anesthesia provider and entire team: Patient identity, procedure with side and site, patient position, and as applicable the availability of implants/special equipment/special requirements.  Patient on coagulant treatment: no  Timeout performed at: 7/31/2025 9:12 AM  Spinal Block  Patient position: sitting  Prep: Betadine  Sterility prep: cap, drape, gloves, hand hygiene and mask  Sedation level: light sedation  Patient monitoring: blood pressure, continuous pulse oximetry and heart rate  Approach: midline  Vertebral space: L3-4  Injection technique: single-shot  Needle  Needle type: pencil-point   Needle gauge: 24 G  Needle length: 4 in  Free flowing CSF: yes    Assessment  Sensory level: T10  Block outcome: patient comfortable  Procedure assessment: patient sedated but conversant throughout procedure

## 2025-07-31 NOTE — PERIOPERATIVE NURSING NOTE
1124 Arrival to PACU. Drowsy but easily arousable. Spinal at level of groin. Unable to move legs yet. Feet pink, warm with brisk cap refill. Left hip prineo/mepilex dressing cdi. Polar care applied.     1134 Albuterol treatment given for patient complaint of not being able to clear mucus.    1135 Xrays done at bedside.     1212 Friend updated via phone call. Able to wiggle toes, move feet. Sensation at level of mid-thigh.     1300 Dr Vieira spoke to patient and would like to try to get him home today. Updated patient's friend via phone call.     1315 Tolerating PO. Sensation  level at feet. Able to bend knee slightly.

## 2025-07-31 NOTE — HH CARE COORDINATION
Home Care received a Referral for Physical Therapy and Occupational Therapy. We have processed the referral for a Start of Care within 48 hours of 08/01.     If you have any questions or concerns, please feel free to contact us at [phone]. Follow the prompts, enter your five digit zip code, and you will be directed to your care team on [facility].

## 2025-07-31 NOTE — PROGRESS NOTES
Physical Therapy    Physical Therapy Evaluation & Treatment    Patient Name: Sidney Fischer  MRN: [id number]  Department:   Room: [facility]  Today's Date: 7/31/2025   Time Calculation  Start Time: 1603  Stop Time: 1703  Time Calculation (min): 60 min    Assessment/Plan   PT Assessment  PT Assessment Results: Decreased strength, Decreased range of motion, Decreased endurance, Impaired balance, Decreased mobility, Pain, Orthopedic restrictions  Rehab Prognosis: Good  Barriers to Discharge Home: No anticipated barriers  End of Session Communication: Bedside nurse  Assessment Comment: Pt is POD #0, s/p L JULISA & is WBAT.  The pt presents with diminished safety & independence regarding bed mobility, transfers, and gait.  The patient is able to ascend/descend stairs and requires cueing in regard to JULISA precautions.  Contributing to these impairments are post-op pain, decreased L hip ROM & strength.  The patient would benefit from continued PT to address the above functional limitations & improve independence/safety.  Anticipate low frequency PT needs, at discharge  End of Session Patient Position: Up in chair, Alarm on   IP OR SWING BED PT PLAN  Inpatient or Swing Bed: Inpatient  PT Plan  Treatment/Interventions: Bed mobility, Transfer training, Gait training, Stair training, Balance training, Endurance training, Strengthening, Range of motion, Therapeutic exercise, Therapeutic activity, Home exercise program  PT Plan: Ongoing PT  PT Frequency: BID  PT Discharge Recommendations: Low intensity level of continued care  PT Recommended Transfer Status: Assist x1  PT - OK to Discharge: Yes (PT POC established.)      Subjective     PT Visit Info:  PT Received On: 07/31/25  General Visit Information:  General  Reason for Referral: L JULISA- Anterior  Referred By: Dr. Barber  Past Medical History Relevant to Rehab: Medical History[1]  Prior to Session Communication: Bedside nurse  Patient Position Received: Bed, 3 rail up,  Alarm on  General Comment: Pt pleasant and agreeable to PT eval. Pt's significant other present for session.  Home Living:  Home Living  Type of Home: [place]  Lives With: Alone  Home Adaptive Equipment: Walker rolling or standard, Cane  Home Layout: Two level, Bed/bath upstairs, Stairs to alternate level with rails  Alternate Level Stairs-Number of Steps: 12  Home Access: Stairs to enter without rails  Entrance Stairs-Rails: None  Entrance Stairs-Number of Steps: 2  Bathroom Shower/Tub: Tub/shower unit  Bathroom Toilet: Adaptive toilet seating  Bathroom Equipment: Raised toilet seat with rails  Prior Level of Function:  Prior Function Per Pt/Caregiver Report  Level of [place]: Independent with ADLs and functional transfers, Independent with homemaking with ambulation  Receives Help From:  (Pt's significant other will be staying with him to assist)  ADL Assistance: Independent  Homemaking Assistance: Independent  Ambulatory Assistance: Independent  Prior Function Comments: Drives, Denies any falls in the past 6 months.  Precautions:  Precautions  LE Weight Bearing Status: Weight Bearing as Tolerated  Medical Precautions: Fall precautions  Post-Surgical Precautions: Left hip precautions (posterior)     Date/Time Vitals Session Patient Position Pulse Resp SpO2 BP MAP (mmHg)    07/31/25 1647 --  --  79  17  99 %  149/91  102      Vital Signs Comment: Supine 137/84   Sitting 146/90  Standing   135/86    Objective   Pain:  Pain Assessment  Pain Assessment: 0-10  0-10 (Numeric) Pain Score: 5 - Moderate pain  Pain Type: Surgical pain  Pain Location:  (L hip)  Pain Interventions: Ambulation/increased activity, Repositioned  Cognition:  Cognition  Overall Cognitive Status: Within Functional Limits  Orientation Level: Oriented X4    General Assessments:  Activity Tolerance  Endurance: Endurance does not limit participation in activity    Sensation  Light Touch: No apparent deficits         Postural Control  Postural  Control: Within Functional Limits    Static Sitting Balance  Static Sitting-Balance Support: Feet supported, Bilateral upper extremity supported  Static Sitting-Level of Assistance: Close supervision  Dynamic Sitting Balance  Dynamic Sitting-Balance Support: Feet supported, Bilateral upper extremity supported  Dynamic Sitting-Level of Assistance: Close supervision    Static Standing Balance  Static Standing-Balance Support: Bilateral upper extremity supported  Static Standing-Level of Assistance: Contact guard  Dynamic Standing Balance  Dynamic Standing-Balance Support: Bilateral upper extremity supported  Dynamic Standing-Level of Assistance: Contact guard  Functional Assessments:  Bed Mobility  Bed Mobility: Yes  Bed Mobility 1  Bed Mobility 1: Supine to sitting  Level of Assistance 1: Close supervision  Bed Mobility Comments 1: HOB elevated. Increased effort to complete.  Uses UE to maneuver LEs.    Transfers  Transfer: Yes  Transfer 1  Technique 1: Sit to stand, Stand to sit  Transfer Device 1: Walker  Transfer Level of Assistance 1: Contact guard  Trials/Comments 1: Cues for hand/foot placement, scooting to the EOB, and backing up fully prior to sitting.  Transfers 2  Trials/Comments 2: Pt educated on proper car transfer technique with step by step cues. Pt and family demonstrate good understanding of technique.    Ambulation/Gait Training  Ambulation/Gait Training Performed: Yes  Ambulation/Gait Training 1  Surface 1: Level tile  Device 1: Rolling walker  Assistance 1: Contact guard  Comments/Distance (ft) 1: 60 ft x2. Pt ambulates with decreased irma and step length. Cues for upright posture and forward gaze. Progressing from step to > step through gait pattern. Overall demonstrating good stability.  Extremity/Trunk Assessments:  RUE   RUE : Within Functional Limits  LUE   LUE: Within Functional Limits  RLE   RLE : Within Functional Limits  LLE   LLE :  (Hip flexion 2/5, >3/5 distally. Hip ROM limited by  "surgical precautions.)  Treatments:  Therapeutic Exercise  Therapeutic Exercise Performed: Yes  Therapeutic Exercise Activity 1: Pt completes each of the following x15 reps in order to improve strength and endurance: ankle pumps, glute sets, quad sets, heel slides, supine hip abduction, SAQs, LAQs, . Cues for proper form and technique.    Therapeutic Activity  Therapeutic Activity Performed: Yes  Therapeutic Activity 1: Pt educated on anterior hip precautions and weight bearing status. Cues for proper transter technique including hand/foot placement, anterior weight shifting, scooting to the EOB, and backing up fully prior to sitting. Facilitated safe gait training with cues for AD placement, sequencing, and body mechanics.    Stairs  Stairs: Yes  Stairs  Rails 1: None (Comment)  Device 1: Single point cane  Assistance 1: Contact guard  Comment/Number of Steps 1: Pt educated on \"up with the good, down with the bad\" stair negotiation technique. Completes 4 stairs x2 up/down with min cues for sequencing. Pt's partner educated on proper guarding technique.  Outcome Measures:  [facility] Basic Mobility  Turning from your back to your side while in a flat bed without using bedrails: A little  Moving from lying on your back to sitting on the side of a flat bed without using bedrails: A little  Moving to and from bed to chair (including a wheelchair): A little  Standing up from a chair using your arms (e.g. wheelchair or bedside chair): A little  To walk in hospital room: A little  Climbing 3-5 steps with railing: A little  Basic Mobility - Total Score: 18    Encounter Problems       Encounter Problems (Active)       Mobility       STG - Patient will ambulate 150 ft mod I with a RW       Start:  07/31/25    Expected End:  08/14/25            STG - Patient will ascend and descend a flight of stairs mod I with unilateral HR and LRAD.        Start:  07/31/25    Expected End:  08/14/25            Pt will tolerate 15 reps of each LE " exercise in order to improve strength and endurance.         Start:  07/31/25    Expected End:  08/14/25               PT Transfers       STG - Patient will perform bed mobility independently.        Start:  07/31/25    Expected End:  08/14/25            STG - Patient will transfer sit to and from stand mod I with a RW       Start:  07/31/25    Expected End:  08/14/25            STG - Patient will perform car transfer mod I       Start:  07/31/25    Expected End:  08/14/25               Safety       LTG - Patient will adhere to anterior hip precautions during ADL's and transfers independently.        Start:  07/31/25    Expected End:  08/14/25                   Education Documentation  Handouts, taught by Nani Grove PT at 7/31/2025  6:13 PM.  Learner: Patient  Readiness: Acceptance  Method: Explanation  Response: Verbalizes Understanding    Precautions, taught by Nani Grove PT at 7/31/2025  6:13 PM.  Learner: Patient  Readiness: Acceptance  Method: Explanation  Response: Verbalizes Understanding    Body Mechanics, taught by Nani Grove PT at 7/31/2025  6:13 PM.  Learner: Patient  Readiness: Acceptance  Method: Explanation  Response: Verbalizes Understanding    Home Exercise Program, taught by Nani Grove PT at 7/31/2025  6:13 PM.  Learner: Patient  Readiness: Acceptance  Method: Explanation  Response: Verbalizes Understanding    Mobility Training, taught by Nani Grove PT at 7/31/2025  6:13 PM.  Learner: Patient  Readiness: Acceptance  Method: Explanation  Response: Verbalizes Understanding    Education Comments  No comments found.           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

## 2025-07-31 NOTE — NURSING NOTE
Met with Patient and Care Partner at bedside- Patient is s/p Left Anterior Hip Replacement with Dr. Barber. Discussion with patient included education on the following topics: [facility] Education: Wound Care (Bandage Care & Removal, Personal Hygiene & Infection Prevention), Post-Op Activity (Home PT Regimen, Movement Precautions, Assistive Equipment & 1-2hr Movement), Post-Op Precautions (Falls, Blood Clots & Constipation), Cold-Therapy (Ice vs. Cold Therapy Machines) , Methods for Symptom Management (Pain, Nausea, Swelling & Constipation), Importance of Post-op Prescriptions, How to Obtain Medication Refills, When to Resume Driving & Who to Call, Use of [employer] [facility] & Staff Contact Information, When to call 9-1-1, and When to call the Surgeon's Office. Patient attended joint class prior to surgery. Patient is able to verbalize understanding of class content/discussion. Contact information was provided to patient for support and assistance during the post-operative period. He was given My Medication Education tool as a reference for his discharge medications.

## 2025-07-31 NOTE — DISCHARGE INSTRUCTIONS
Total Hip Arthroplasty   Postoperative Instructions    CONTACT INFORMATION  Jazzy Barber MD  Joint Replacement Surgeon   [facility]     [phone]     Nida Chapman MBA, BSN, RN-BC  Ortho Coordinator [place]  [phone]    Amaury Ye RN, BSN  Ortho Coordinator [facility]  [phone]    Susana Starks BSN-BC  Ortho Nurse Navigator  [phone]    DRIVING AFTER SURGERY   Please discuss post-surgical, long-distance travel with your surgeon. You may not drive until you are off narcotics and cleared by your surgical team.     WOUND CARE   A waterproof dressing was placed over your surgical site. You may shower over this dressing after surgery and pat it dry. Please do not scrub, soak, or submerge your surgical site for at least three weeks after surgery to allow your incision to heal. Avoid using creams and ointments around your surgical site while it is healing.    Your Mepilex dressing will be removed on post-operative day 7 by your physical therapist. If you have a Prevena VAC dressing (purple sponge) dressing, this will stay on for 1-2 weeks before removal by your physical therapist or surgeon. The incision may be left open to air after the bandage has been removed. Please do not submerge your incision in a hot tub/pool/lake/etc. until cleared by your surgeon. Please contact the office if there are any concerns with your wound. You may have a mesh dressing (prineo) underneath your Mepilex dressing.  Leave this in place until your follow-up visit with your surgeon.  You may shower over this dressing.    Pain, swelling, and bruising are normal after total hip replacement surgery. You may alleviate these symptoms by following the post-operative pain regimen that was prescribed, icing your surgical site multiple times a day, and elevating your extremity throughout the day.     ACTIVITY AND HIP PRECAUTIONS  Please follow the post-operative activity and hip precautions that  were described to you by your surgical team and physical therapists.    Weightbearing restriction: weightbearing as tolerated     If you had anterior total hip replacement surgery, please avoid excessive hip extension and external rotation.    If you had posterior total hip replacement surgery, please avoid hip flexion greater than 90 degrees, adduction past midline, and internal rotation beyond neutral.    DISCHARGE MEDICATIONS  Pain  Please take the pain medications that were prescribed to you according to the prescribed schedule. You may not operate a motor vehicle while taking narcotic medications (e.g. Oxycodone, Tramadol).     Please take Tylenol and NSAIDs (if you are able) on a schedule as discussed in clinic. Please take the prescribed Pantoprazole to protect your stomach from ulceration after surgery while taking NSAIDs.     Please wean off the narcotic pain medications as soon as you are able.     Blood-Thinners  Please take the blood thinning medications that were prescribed according to the instructions from your surgeon. These are typically continued for 6 weeks postoperatively. This schedule may differ if you were already on blood-thinning medication prior to your surgery.   Nausea  You may feel nauseous after surgery due to the anesthetics or pain medications you are taking. You may take anti-nausea medication (e.g. Ondansetron) to help with these symptoms.     Stool Softeners   Please take the stool softeners that were prescribed at discharge (e.g. Colace, Miralax) while you are on narcotic pain medications to minimize your risk of constipation.    Home Medications   You may restart your home medications at time of discharge according to your discharge instructions.    PHYSICAL THERAPY AND OCCUPATIONAL THERAPY   In-home physical therapy and occupational therapy will start within a few days of your discharge to home. You will transition to outpatient physical therapy around 2-3 weeks after your  surgery.     FOLLOW-UP  You will see your surgical team around 2 weeks after surgery for a wound check (unless otherwise arranged) and between 4-6 weeks after surgery for X-rays and clinical evaluation.    CALL YOUR SURGEON IF YOU HAVE:   ° Drainage that is not contained by your surgical dressing.  ° Redness, pain or swelling in your calf.   ° Severe pain that is not controlled by the pain regimen prescribed.   ° Fever >101 Fahrenheit presents for at least 48 hours or other signs of infection (wound drainage, redness around your surgical incision, increased joint pain)  ° Go to the emergency department or call 911 if you experience chest pain, shortness of breath or other emergent symptoms. Do not call your surgeon first.     ANTIBIOTIC PROPHYLAXIS AFTER JOINT REPLACEMENT SURGERY   Although it is a rare occurrence, an artificial joint can become infected by the bloodstream carrying infection from another part of the body to the replaced joint.  Therefore, it is important that any bacterial infection be treated promptly. If you are unsure of whether you need to take antibiotics, please call the office before taking any medication.  We advise that you take antibiotics prior to the following invasive procedures for a lifetime. Please wait at least 3-6 months after joint replacement surgery before undergoing dental work or cleaning.    Please take antibiotics one hour before any of the following procedures:  ° Routine dental cleaning or dental procedure  ° Root canal  ° Podiatry procedures that involve cutting into the skin  ° Dermatologic procedures that involve cutting into the skin.  (Not required for laser or freezing of skin)  ° Invasive procedures regarding the urinary tract     Antibiotics are not required for the following procedures:   ° Manicures/Pedicures  ° Colonoscopy/Endoscopy/Sigmoidoscopy  ° Routine gynecologic exams/procedures/PAP smears, D&C´s  ° Cataract/laser eye surgery  ° Injection or blood  work  ° Routine colds and flu and minor cuts and bruises    You may have a flu shot at any time following your surgery.

## 2025-07-31 NOTE — ANESTHESIA PREPROCEDURE EVALUATION
"                                                    Pre-Anesthesia Evaluation                                         Gaurav Fischer \"Sidney\" is a 75 y.o. male who presents for the above mentioned procedure due to Unilateral primary osteoarthritis, left hip [M16.12];OA (osteoarthritis) [M19.90]    Medical History[1]  Surgical History[2]  Social History[3]  RX Allergies[4]  Current Medications[5]  Prior to Admission medications   Medication Sig Start Date End Date Taking? Authorizing Provider   acetaminophen (Tylenol 8 HOUR) 650 mg ER tablet Take 1 tablet (650 mg) by mouth every 8 hours if needed for mild pain (1 - 3). Do not crush, chew, or split.    Historical Provider, MD   amLODIPine (Norvasc) 5 mg tablet Take 1 tablet (5 mg) by mouth once daily. 3/31/25   Renetta Barnett MD   apixaban (Eliquis) 5 mg tablet Take 1 tablet (5 mg) by mouth 2 times a day. 5/22/25 5/22/26  Quincy Benavides DO   aspirin 81 mg tablet Take 1 tablet by mouth once daily.    Historical Provider, MD   azelastine (Astelin) 137 mcg (0.1 %) nasal spray Administer into affected nostril(s).    Historical Provider, MD   cetirizine (ZyrTEC) 10 mg tablet Take 1 tablet (10 mg) by mouth if needed. 3/3/15   Historical Provider, MD   chlorhexidine (Peridex) 0.12 % solution Swish for 30 seconds and spit 15mL of solution the night before and morning of surgery 7/24/25   Yanet Jaramillo PA-C   desoximetasone (Topicort) 0.25 % cream Desoximetasone 0.25 % External Cream APPLY AND GENTLY MASSAGE INTO AFFECTED AREA(S) TWICE DAILY. as needed Refills: 0 Start : 26-Oct-2016 Active 10/26/16   Historical Provider, MD   famotidine (Pepcid) 20 mg tablet Take 1 tablet (20 mg) by mouth once daily.    Historical Provider, MD   ferrous sulfate (Slow Fe) 137 mg (45 mg iron) tablet extended release Take 1 tablet (45 mg of elemental iron) by mouth once daily.    Historical Provider, MD   hydrocortisone 1 % ointment Apply topically if needed.    Historical Provider, " "MD   levothyroxine (Synthroid, Levoxyl) 112 mcg tablet TAKE 1 TABLET (112 MCG) BY MOUTH ONCE DAILY IN THE MORNING. TAKE ON EMPTY STOMACH 12/5/24   Renetta Barnett MD   lovastatin (Mevacor) 40 mg tablet Take 1 tablet (40 mg) by mouth once daily at bedtime. 5/19/25   Renetta Barnett MD   MELATONIN ORAL Take 1 mg by mouth. Take as directed    Historical Provider, MD   meloxicam (Mobic) 15 mg tablet TAKE 1 TABLET (15 MG) BY MOUTH ONCE DAILY. TO TAKE WITH FOOD AND 1 GLASS OF WATER 1/29/25 1/29/26  Renetta Barnett MD   neomycin-polymyxin-pramoxine (Neosporin) 3.5-10,000-10 mg-unit-mg/gram cream Apply topically.    Historical Provider, MD   tadalafil (Cialis) 10 mg tablet Take 1 tablet (10 mg) by mouth if needed for erectile dysfunction. 5/21/24 7/24/25  Faby Tejada MD   triamcinolone (Nasacort) 55 mcg nasal inhaler Administer into affected nostril(s).    Historical Provider, MD     No medication comments found.    Visit Vitals  Smoking Status Former                         BP Readings from Last 4 Encounters:   07/24/25 130/76   06/25/25 130/80   05/22/25 145/85   05/07/25 136/90      If applicable:  No results found for: \"PREGTESTUR\", \"PREGSERUM\", \"HCG\", \"HCGQUANT\"        Recent Labs     07/24/25  1347 02/24/25  1247   WBC 5.4 6.6   HGB 13.2* 14.7   HCT 39.4* 43.4    342   MCV 86 87.3     Recent Labs     07/24/25  1347 02/24/25  1247 07/14/23  1106 02/06/23  1104 06/14/22  1522 10/07/21  1336   EGFR 71 79   < >  --   --   --    ANIONGAP 10 10   < > 10   < > 10   BUN 24* 17   < > 20   < > 20   CREATININE 1.09 1.00   < > 1.08   < > 1.11    137   < > 140   < > 140   K 4.4 4.4   < > 4.2   < > 4.1    100   < > 103   < > 105   CO2 28 27   < > 31   < > 29   GLUCOSE 89 83   < > 88   < > 86   CALCIUM 9.1 9.2   < > 9.2   < > 9.4   PHOS  --   --   --  3.4  --  3.3    < > = values in this interval not displayed.     Recent Labs     07/24/25  1347 06/25/25  1059 02/24/25  1247 10/17/24  1116 " "01/19/24  1009   HGBA1C 5.5  --   --    < >  --    TSH  --  1.62 1.15   < > 3.99*   FREET4  --   --   --   --  1.19    < > = values in this interval not displayed.     Recent Labs     02/24/25  1247 12/20/24  1418 11/18/24  0948   BILITOT 0.8  --  0.4   PROT 7.2  --  6.8   ALBUMIN 4.9   < > 4.5   ALKPHOS 80  --  84   ALT 22  --  14   AST 26  --  18    < > = values in this interval not displayed.     Recent Labs     08/14/24  1106   PROTIME 10.9   INR 1.0     No results found for: \"ABO\", \"LABRH\", \"ABSCRN\"  Lab Results   Component Value Date    FERRITIN 40 10/17/2024    TIBC 337 02/24/2025    IRONSAT 34 02/24/2025     Lab Results   Component Value Date    STAPHMRSASCR No Staphylococcus aureus isolated 07/24/2025     No results for input(s): \"AMPHETAMINE\", \"MAMPHBLDS\", \"BARBITURATE\", \"BENZODIAZ\", \"BUPRENBLDS\", \"CANNABBLDS\", \"COCBLDS\", \"METHABLDS\", \"OXYBLDS\", \"PCPBLDS\", \"OPIATBLDS\", \"DRBLDCOMM\" in the last 28804 hours.  No results for input(s): \"PHART\", \"WRX5FPO\", \"PO2ART\", \"LQZ8TQA\", \"S3OIUGBZ\", \"BEART\", \"A9ZNKDPS\" in the last 95486 hours.    EKG   Encounter Date: 05/22/25   ECG 12 lead (Clinic Performed)   Result Value    Ventricular Rate 69    Atrial Rate 69    CO Interval 194    QRS Duration 92    QT Interval 380    QTC Calculation(Bazett) 407    P Axis 62    R Axis 3    T Axis 84    QRS Count 11    Q Onset 226    P Onset 129    P Offset 177    T Offset 416    QTC Fredericia 398    Narrative    Normal sinus rhythm  Nonspecific T wave abnormality  Abnormal ECG  When compared with ECG of 24-FEB-2025 10:12,  No significant change was found      Ejection Fractions:No results found for: \"EF\"  Echocardiogram   Results for orders placed in visit on 05/18/21    Echocardiogram    Narrative  [facility], [address] [place] [address]  Tel [phone] and Fax [phone]    TRANSTHORACIC ECHOCARDIOGRAM REPORT      Patient Name:     SUGAR Juarez Physician:   [id number] Benavides " DO  Study Date:       5/18/2021      Referring Physician: TAREK GHARIBEH  MRN/PID:          [id number]       PCP:  Accession/Order#: [id number]   [place] [facility]  YOB: 1950       Fellow:  Gender:           M              Nurse:  Admit Date:                      Sonographer:         Davonte Hendrix RDCS  Admission Status: Outpatient     Additional Staff:  Height:           177.80 cm      CC Report to:  Weight:           88.45 kg       Study Type:          Echocardiogram  BSA:              2.06 m2  Blood Pressure: 139 /85 mmHg    Diagnosis/ICD: R06.00-Dyspnea, unspecified  Indication:    Dyspnea on exertion  Procedure/CPT: Echo Complete w Full Doppler-63350    Patient History:  Pertinent History: HTN; AVILA.    Study Detail: The following Echo studies were performed: 2D, M-Mode, Doppler and  color flow. Image quality for this study is less than ideal.  Technically challenging study due to body habitus.      PHYSICIAN INTERPRETATION:  Left Ventricle: The left ventricular systolic function is normal, with an estimated ejection fraction of 60-65%. Estimated left ventricular mass is borderline increased. There are no regional wall motion abnormalities. The left ventricular cavity size is normal. The left ventricular septal wall thickness is moderately increased. There is normal left ventricular posterior wall thickness. Spectral Doppler shows a normal pattern of left ventricular diastolic filling. There are normal left ventricular filling pressures.  Left Atrium: The left atrium is normal in size.  Right Ventricle: The right ventricle is normal in size. There is normal right ventricular global systolic function.  Right Atrium: The right atrium is normal in size.  Aortic Valve: The aortic valve is trileaflet. The aortic valve appears tricuspid. There is mild aortic valve cusp calcification. There is no evidence of aortic valve regurgitation. The peak instantaneous gradient of  the aortic valve is 4.1 mmHg.  Mitral Valve: The mitral valve is mildly thickened. There is mild mitral valve regurgitation.  Tricuspid Valve: The tricuspid valve is structurally normal. There is trace to mild tricuspid regurgitation. The Doppler estimated RVSP is within normal limits at 19.4 mmHg.  Pulmonic Valve: The pulmonic valve is not well visualized. There is trace pulmonic valve regurgitation.  Pericardium: There is no pericardial effusion noted.  Aorta: The aortic root is abnormal. There is mild dilatation of the ascending aorta. There is mild dilatation of the aortic root.  Systemic Veins: The inferior vena cava appears to be of normal size. There is IVC inspiratory collapse greater than 50%.  In comparison to the previous echocardiogram(s): There are no prior studies on this patient for comparison purposes.      CONCLUSIONS:  1. The left ventricular systolic function is normal with a 60-65% estimated ejection fraction.  2. Borderline increased LV mass.  3. Moderately increased left ventricular septal thickness.  4. RVSP within normal limits.    QUANTITATIVE DATA SUMMARY:  2D MEASUREMENTS:  Normal Ranges:  Ao Root d:     4.00 cm    (2.0-3.7cm)  LAs:           3.44 cm    (2.7-4.0cm)  IVSd:          1.52 cm    (0.6-1.1cm)  LVPWd:         0.91 cm    (0.6-1.1cm)  LVIDd:         5.25 cm    (3.9-5.9cm)  LVIDs:         3.50 cm  LV Mass Index: 125.0 g/m2  LV % FS        33.4 %    LA VOLUME:  Normal Ranges:  LA Vol A4C:       48.0 ml    (22+/-6mL/m2)  LA Vol A2C:       46.6 ml  LA Vol BP:        49.9 ml  LA Vol Index A4C: 23.2 ml/m2  LA Vol Index A2C: 22.6 ml/m2  LA Vol Index BP:  24.1 ml/m2  LA Volume Index:  24.1 ml/m2  LA Vol A4C:       41.5 ml  LA Vol A2C:       42.1 ml    RA VOLUME BY A/L METHOD:  Normal Ranges:  RA Area A4C: 10.2 cm2    AORTA MEASUREMENTS:  Normal Ranges:  Asc Ao, d: 3.90 cm (2.1-3.4cm)    LV SYSTOLIC FUNCTION BY 2D PLANIMETRY (MOD):  Normal Ranges:  EF-A4C View: 62.8 % (>55%)  EF-A2C View:  59.1 %  EF-Biplane:  60.5 %    LV DIASTOLIC FUNCTION:  Normal Ranges:  MV Peak E:        0.54 m/s    (0.7-1.2 m/s)  MV Peak A:        0.61 m/s    (0.42-0.7 m/s)  E/A Ratio:        0.89        (1.0-2.2)  MV e'             0.08 m/s    (>8.0)  MV A Dur:         152.25 msec  E/e' Ratio:       6.75        (<8.0)  MV DT:            326 msec    (150-240 msec)  PulmV Sys Carlitos:    43.14 cm/s  PulmV Fontaine Carlitos:   41.02 cm/s  PulmV S/D Carlitos:    1.05  PulmV A Revs Carlitos: 21.82 cm/s  PulmV A Revs Dur: 162.63 msec    AORTIC VALVE:  Normal Ranges:  AoV Vmax:      1.01 m/s (<1.7m/s)  AoV Peak P.1 mmHg (<20mmHg)  LVOT Max Carlitos:  0.93 m/s (<1.1m/s)  LVOT VTI:      18.91 cm  LVOT Diameter: 2.33 cm  (1.8-2.4cm)  AoV Area,Vmax: 3.93 cm2 (2.5-4.5cm2)    RIGHT VENTRICLE:  RV 1   2.9 cm  RV 2   2.4 cm  RV 3   5.0 cm  TAPSE: 19.0 mm  RV s'  0.12 m/s    TRICUSPID VALVE/RVSP:  Normal Ranges:  Peak TR Velocity: 2.03 m/s  RV Syst Pressure: 19.4 mmHg (< 30mmHg)  IVC Diam:         1.70 cm    PULMONIC VALVE:  Normal Ranges:  PV Max Carlitos: 0.8 m/s  (0.6-0.9m/s)  PV Max P.5 mmHg    Pulmonary Veins:  PulmV A Revs Dur: 162.63 msec  PulmV A Revs Carlitos: 21.82 cm/s  PulmV Fontaine Carlitos:   41.02 cm/s  PulmV S/D Carlitos:    1.05  PulmV Sys Carlitos:    43.14 cm/s      [id number] Quincy Benavides DO  Electronically signed on 2021 at 6:47:58 PM         Final     Stress TestingNo results found for this or any previous visit from the past 60840 days.   No results found for this or any previous visit from the past 365 days.     Cardiology Interpretation Of Nuclear Stress - See Other Report For Nuclear Portion 2025    [facility], [address] [place] [address]  Tel [phone] and Fax [phone]    Nuclear Pharmacologic Stress Test    Patient Name:      SUGAR IRVIN    Ordering Provider:     [id number] BENAVIDES  Study Date:        2025            Reading Physician:     [id number] Benavides DO  MRN/PID:           [id number]             Supervising Physician: [id number] Quincy Benavides   Accession#:        [id number]        Fellow:  Date of Birth/Age: 1950 / 74 years Fellow:  Gender:            M                   Nurse:                 Chas Smith RN  Admit Date:        6/4/2025            Technician:            Ifeoma Laguna  Admission Status:  Outpatient          Sonographer:           N/A  Height:            176.53 cm           Technologist:  Weight:            94.80 kg            Additional Staff:  BSA:               2.12 m2             Encounter#:            [id number]  BMI:               30.42 kg/m2         Patient Location:      [facility] Non  Invasive    Study Type:    CARDIOLOGY INTERPRETATION OF NUCLEAR STRESS  Diagnosis/ICD: Encounter for preprocedural cardiovascular examination-Z01.810;  Abnormal findings on diagnostic imaging of heart and coronary  circulation-R93.1  Indication:    Pre-Op Evaluation and Elevated CACS  CPT Code:      Stress Test Interpretation-36751; Stress Test Supervision-79884    Falls Risk: Low: Patient has a low risk for sustaining a fall; enviromental safety interventions in place.    Study Details: Correct procedure and correct patient verified verbally and with  ID Band checked.      Patient History: Hypertension, dyspnea and atrial fibrillation. 74 y.o. male presents for nuclear pharmcological stress test for pre-op cardiovascular exam and elevated coronary artery calcium score.    PMH of GERD, DEANGELO, and Osteoarthritis.  Allergies: Sulfamethoxazole-Trimethoprim, Mildew, Griseofulvin, and Pollen  extracts.  Smoker:    Former.  Diabetes:  No.      Medications: The patient's prescribed medication is Nasacort, Amlodipine, Eliquis, ASA, Astelin, Zyrtec, Topicort, Cymbalta, Pepcid, Ferrous Sulfate, Hydrocortisone, Synthroid, Lovastatin, Melatonin, Meloxicam, Cialis. The patient took medications as prescribed.    Patient Performance: Patient received a total of 0.4 mg of Regadenoson at 8:33:45 AM.  Patient received a total of 33.3 mCi of Myoview at 8:34:01 AM. The patient did not exercise during infusion. The peak heart rate achieved was 93 bpm, which was 64 % of the age predicted target heart rate of 146 bpm. The resting blood pressure was 130/78 mmHg with a heart rate of 66 bpm. The standing blood pressure was 132/82 mmHg with a heart rate of 70 bpm. The patient developed shortness of breath and ABD cramping during the stress exam. The symptoms resolved with rest. The blood pressure response was normal. The test was terminated due to: completed lab protocol. Patient has met the discharge criteria and is discharged to nuclear medicine.    Baseline ECG: Resting ECG showed normal sinus rhythm.    Stress ECG: Stress ECG showed normal sinus rhythm, with no abnormal findings. No ST changes.        Summary:  1. Adequate level of stress achieved.  2. No clinical or electrocardiographic evidence for ischemia at a maximal infusion.  3. Nuclear image results are reported separately.    [id number] Quincy Benavides DO  Electronically signed on 6/4/2025 at 2:56:33 PM              ** Final **     Cardiac Catheterization  No results found for this or any previous visit from the past 365 days.   No results found for this or any previous visit from the past 365 days.   No results found for this or any previous visit from the past 365 days.     Right Heart Cath No results found for this or any previous visit from the past 365 days.    Cardiac Scoring  No results found for this or any previous visit from the past 365 days.     AAA screen No results found for this or any previous visit from the past 365 days.     Carotid DopplerNo results found for this or any previous visit from the past 76921 days.      X Ray === 07/09/25 ===    XR PELVIS 1-2 VIEWS    - Impression -  Degenerative disc space narrowing at L4-5 and L5-S1 levels with  spondylotic spurring of the endplates.    Signed by: Edita Crawford 7/10/2025 7:34 PM  Dictation  "workstation:   [id number]  Pulmonary Function Tests   No results found for this or any previous visit from the past 365 days.    No results found for: \"FEV1\", \"FVC\", \"MUM1TWB\", \"TLC\", \"DLCO\"   OTHER: No results found for this or any previous visit from the past 1825 days.    The ASCVD Risk score (Gayla ARNETT, et al., 2019) failed to calculate for the following reasons:    The valid total cholesterol range is 130 to 320 mg/dL    Code Status: Full Code                Patient: Gaurav Fischer \"Rich\"    Procedure Information       Date/Time: 07/31/25 0915    Procedure: Left Hip Total Arthroplasty ~ Anterior Approach (Left: Hip)    Location: [facility] A OR 09 / [facility] OR    Surgeons: Jazzy Barber MD            Relevant Problems   Cardiac   (+) Other hyperlipidemia   (+) Paroxysmal atrial fibrillation (Multi)   (+) Primary hypertension      Pulmonary   (+) Dyspnea on exertion      GI   (+) Esophageal reflux      /Renal   (+) BPH with obstruction/lower urinary tract symptoms   (+) Benign prostatic hyperplasia with urinary frequency      Liver   (+) Alpha-1-antitrypsin deficiency (Multi)   (+) Gallstones      Endocrine   (+) Acquired hypothyroidism   (+) Medullary thyroid carcinoma      Hematology   (+) Anemia, deficiency   (+) Iron deficiency anemia      Musculoskeletal   (+) Chronic left-sided low back pain with left-sided sciatica   (+) Chronic low back pain   (+) OA (osteoarthritis)   (+) Unilateral primary osteoarthritis, left hip      HEENT   (+) Chronic ethmoidal sinusitis   (+) Chronic maxillary sinusitis      Respiratory   (+) Obstructive sleep apnea of adult       Clinical information reviewed:                   NPO Detail:  No data recorded     PHYSICAL EXAM    Anesthesia Plan    History of general anesthesia?: yes  History of complications of general anesthesia?: no    ASA 3     spinal     intravenous induction   Anesthetic plan and risks discussed with patient.    Plan discussed with CRNA and " FRANCOIS.             [1]   Past Medical History:  Diagnosis Date    Alpha-1-antitrypsin deficiency (Multi)     not progressive, no Pulmonary MD    Anemia     14.7/43.4 HH&H /    Arthritis 2024    Left hip    Atrial fibrillation (Multi) 2025    Benign prostatic hyperplasia     with LUTS    Cancer (Multi)     thyroid (medullary) new Kidney Mass 5cm, partial nephretomy 10/17/25    Erectile dysfunction     GERD (gastroesophageal reflux disease)     Hyperlipidemia     Hypertension     Hypothyroidism     Obesity     Peripheral neuropathy 2025    Sleep apnea     CPAP   [2]   Past Surgical History:  Procedure Laterality Date    APPENDECTOMY  2015    Appendectomy    BACK SURGERY      CIRCUMCISION, PRIMARY      COLONOSCOPY  2015    Complete Colonoscopy    FLEXIBLE SIGMOIDOSCOPY      HERNIA REPAIR  2015    Hernia Repair    LUMBAR LAMINECTOMY  2002    OTHER SURGICAL HISTORY  2017    Laminectomy Decompressive Up To Two Lumbar Segments    OTHER SURGICAL HISTORY  2021    Back surgery    PROSTATE SURGERY      HoLEP    SINUS SURGERY  2015    Sinus Surgery    THYROID SURGERY      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    TONSILLECTOMY  2015    Tonsillectomy    TOTAL THYROIDECTOMY  2022    Thyroid Surgery Total Thyroidectomy    UPPER GASTROINTESTINAL ENDOSCOPY     [3]   Social History  Tobacco Use    Smoking status: Former     Current packs/day: 0.00     Average packs/day: 0.3 packs/day for 0.6 years (0.2 ttl pk-yrs)     Types: Cigars, Cigarettes     Start date: 1971     Quit date: 1971     Years since quittin.7    Smokeless tobacco: Never   Vaping Use    Vaping status: Never Used   Substance Use Topics    Alcohol use: Yes     Alcohol/week: 2.0 standard drinks of alcohol     Types: 2 Cans of beer per week     Comment: 1 can per day usually on Tuesday and on Saturday    Drug use: Never   [4]   Allergies  Allergen Reactions    Sulfamethoxazole-Trimethoprim  Anaphylaxis     patient states throat swells    Other Other     Mildew    Griseofulvin Itching     antifungals    Pollen Extracts Other     sneezing   [5] No current facility-administered medications for this encounter.    Current Outpatient Medications:     acetaminophen (Tylenol 8 HOUR) 650 mg ER tablet, Take 1 tablet (650 mg) by mouth every 8 hours if needed for mild pain (1 - 3). Do not crush, chew, or split., Disp: , Rfl:     amLODIPine (Norvasc) 5 mg tablet, Take 1 tablet (5 mg) by mouth once daily., Disp: 90 tablet, Rfl: 1    apixaban (Eliquis) 5 mg tablet, Take 1 tablet (5 mg) by mouth 2 times a day., Disp: 60 tablet, Rfl: 11    aspirin 81 mg tablet, Take 1 tablet by mouth once daily., Disp: , Rfl:     azelastine (Astelin) 137 mcg (0.1 %) nasal spray, Administer into affected nostril(s)., Disp: , Rfl:     cetirizine (ZyrTEC) 10 mg tablet, Take 1 tablet (10 mg) by mouth if needed., Disp: , Rfl:     chlorhexidine (Peridex) 0.12 % solution, Swish for 30 seconds and spit 15mL of solution the night before and morning of surgery, Disp: 475 mL, Rfl: 0    desoximetasone (Topicort) 0.25 % cream, Desoximetasone 0.25 % External Cream APPLY AND GENTLY MASSAGE INTO AFFECTED AREA(S) TWICE DAILY. as needed Refills: 0 Start : 26-Oct-2016 Active, Disp: , Rfl:     famotidine (Pepcid) 20 mg tablet, Take 1 tablet (20 mg) by mouth once daily., Disp: , Rfl:     ferrous sulfate (Slow Fe) 137 mg (45 mg iron) tablet extended release, Take 1 tablet (45 mg of elemental iron) by mouth once daily., Disp: , Rfl:     hydrocortisone 1 % ointment, Apply topically if needed., Disp: , Rfl:     levothyroxine (Synthroid, Levoxyl) 112 mcg tablet, TAKE 1 TABLET (112 MCG) BY MOUTH ONCE DAILY IN THE MORNING. TAKE ON EMPTY STOMACH, Disp: 90 tablet, Rfl: 2    lovastatin (Mevacor) 40 mg tablet, Take 1 tablet (40 mg) by mouth once daily at bedtime., Disp: 90 tablet, Rfl: 0    MELATONIN ORAL, Take 1 mg by mouth. Take as directed, Disp: , Rfl:      meloxicam (Mobic) 15 mg tablet, TAKE 1 TABLET (15 MG) BY MOUTH ONCE DAILY. TO TAKE WITH FOOD AND 1 GLASS OF WATER, Disp: 30 tablet, Rfl: 1    neomycin-polymyxin-pramoxine (Neosporin) 3.5-10,000-10 mg-unit-mg/gram cream, Apply topically., Disp: , Rfl:     tadalafil (Cialis) 10 mg tablet, Take 1 tablet (10 mg) by mouth if needed for erectile dysfunction., Disp: 30 tablet, Rfl: 11    triamcinolone (Nasacort) 55 mcg nasal inhaler, Administer into affected nostril(s)., Disp: , Rfl:

## 2025-07-31 NOTE — BRIEF OP NOTE
"Date: 2025  OR Location: [facility] OR    Name: Gaurav Fischer \"Sidney\", : 1950, Age: 75 y.o., MRN: [id number], Sex: male    Diagnosis  Pre-op Diagnosis      * Unilateral primary osteoarthritis, left hip [M16.12] Post-op Diagnosis     * Unilateral primary osteoarthritis, left hip [M16.12]     Procedures  Left Hip Total Arthroplasty ~ Anterior Approach  83865 - AZ ARTHRP ACETBLR/PROX FEM PROSTC AGRFT/ALGRFT      Surgeons      * Jazzy Barber - Primary    Resident/Fellow/Other Assistant:  Surgeons and Role:     * Gideon Amaya MD - Resident - Assisting    Staff:   Circulator: Hussain Mooreub Person: Nikkie Mooreub Person: Abdoul Alvarez Scrub: Dilip    Anesthesia Staff: Anesthesiologist: Antonio Johnson MD  CRNA: SAGRARIO Faulkner-AMI    Procedure Summary  Anesthesia: Spinal  ASA: III  Estimated Blood Loss: 250mL  Intra-op Medications:   Administrations occurring from 0915 to 1215 on 25:   Medication Name Total Dose   BUPivacaine-EPINEPHrine (Marcaine w/EPI) 0.5 %-1:200,000 injection 30 mL   sodium chloride 0.9 % irrigation solution 4,000 mL   oxygen (O2) therapy Cannot be calculated   albuterol 2.5 mg /3 mL (0.083 %) nebulizer solution 2.5 mg 2.5 mg   ceFAZolin (Ancef) vial 1 g 2 g   LR bolus Cannot be calculated   lidocaine (LTA Kit) for intubation 4 mL   mepivacaine (Carbocaine) injection 1.5 % 4 mL   phenylephrine (Jose Maria-Synephrine) 10 mg/250 mL NS (40 mcg/mL) infusion 2.89 mg   phenylephrine 100 mcg/mL syringe 10 mL (prefilled) 900 mcg   propofol (Diprivan) injection 10 mg/mL 1,103.18 mg   tranexamic acid (Cyklokapron) injection 1,000 mg              Anesthesia Record               Intraprocedure I/O Totals          Intake    LR bolus 1250.00 mL    Tranexamic Acid 0.00 mL    The total shown is the total volume documented since Anesthesia Start was filed.    Phenylephrine Drip 0.00 mL    The total shown is the total volume documented since Anesthesia Start was filed.    Total Intake " 1250 mL       Output    Est. Blood Loss 250 mL    Total Output 250 mL       Net    Net Volume 1000 mL          Specimen: No specimens collected               Findings: End-stage arthritis of left hip. Large pincer deformity. Moderate cam deformity. Coxa valga femur.     Complications:  None; patient tolerated the procedure well.     Disposition: PACU - hemodynamically stable.  Condition: stable  Specimens Collected: No specimens collected  Attending Attestation: I was present and scrubbed for the entire procedure.    Gideon Deutsch MD  Orthopaedic Surgery, PGY-1

## 2025-07-31 NOTE — ANESTHESIA POSTPROCEDURE EVALUATION
"Patient: Gaurav Fischer \"Rich\"    Procedure Summary       Date: 07/31/25 Room / Location: [facility] OR 09 / [facility] OR    Anesthesia Start: 0901 Anesthesia Stop: 1128    Procedure: Left Hip Total Arthroplasty ~ Anterior Approach (Left: Hip) Diagnosis:       Unilateral primary osteoarthritis, left hip      (Unilateral primary osteoarthritis, left hip [M16.12])    Surgeons: Jazzy Barber MD Responsible Provider: Antonio Johnson MD    Anesthesia Type: Not recorded ASA Status: Not recorded            Anesthesia Type: No value filed.    Vitals Value Taken Time   /72 07/31/25 12:01   Temp 36.8 °C (98.2 °F) 07/31/25 11:24   Pulse 66 07/31/25 12:05   Resp 12 07/31/25 12:00   SpO2 97 % 07/31/25 12:05   Vitals shown include unfiled device data.    Anesthesia Post Evaluation    Patient location during evaluation: bedside  Patient participation: complete - patient participated  Level of consciousness: awake  Pain management: adequate  Airway patency: patent  Cardiovascular status: acceptable  Respiratory status: acceptable  Hydration status: acceptable  Postoperative Nausea and Vomiting: none        No notable events documented.    "

## 2025-07-31 NOTE — DISCHARGE SUMMARY
Discharge Diagnosis  Unilateral primary osteoarthritis, left hip    Issues Requiring Follow-Up  Postoperative follow-up -status post left total hip arthroplasty with Dr. Barber on 7/31/2025    Test Results Pending At Discharge  Pending Labs       No current pending labs.            Hospital Course   75 year-old Male who presented with osteoarthritis of the right hip. Patient is now s/p right hip arthroplasty anterior approach with Dr. Barber on 7/31/2025. On the day of surgery, patient was identified in the pre-operative holding area and agreeable to proceed with surgery. Written consent was obtained.  Please see operative note for further details of this procedure. Patient received 24 hours of radha-operative antibiotics. Patient recovered in the PACU. Patient was started on oxycodone, tylenol, and dilaudid for pain control and aspirin for DVT prophylaxis. Physical therapy recommended continued recovery in a home setting for continued physical therapy and wound care. On the day of discharge, patient was afebrile with stable vital signs. Patient was neurovascularly intact at time of discharge. Patient was discharged with prescription of 3 doses of ASA followed by half home dose of Eliquis for DVT prophylaxis for 1 week, to resume home dose on postop day 8. Patient will follow-up with Dr. Barber in 4-6 weeks for post-operative visit.      Visit Vitals  /74 (BP Location: Left arm, Patient Position: Lying)   Pulse 77   Temp 36 °C (96.8 °F) (Temporal)   Resp 18     Vitals:    07/31/25 0812   Weight: 95 kg (209 lb 7 oz)       Immunization History   Administered Date(s) Administered    COVID-19, mRNA, LNP-S, PF, 30 mcg/0.3 mL dose 03/04/2021, 03/26/2021, 09/30/2021    Flu vaccine (IIV4), preservative free *Check age/dose* 10/13/2021    Flu vaccine, quadrivalent, high-dose, preservative free, age 65y+ (FLUZONE) 11/20/2018, 10/07/2019, 11/01/2020    Flu vaccine, trivalent, preservative free, HIGH-DOSE, age  65y+ (Fluzone) 11/20/2018, 10/07/2019    Influenza, Seasonal, Quadrivalent, Adjuvanted 11/21/2021    Influenza, seasonal, injectable 10/13/2021, 10/09/2024    Pfizer COVID-19 vaccine, 12 years and older, (30mcg/0.3mL) (Comirnaty) 10/16/2023    Pfizer Purple Cap SARS-CoV-2 10/09/2024    Pneumococcal conjugate vaccine, 13-valent (PREVNAR 13) 02/17/2017    Pneumococcal polysaccharide vaccine, 23-valent, age 2 years and older (PNEUMOVAX 23) 07/13/2020    RSV-MAb 10/30/2024    Tdap vaccine, age 7 year and older (BOOSTRIX, ADACEL) 05/22/2022    Zoster, live 01/09/2015       Results      Pertinent Physical Exam At Time of Discharge  Physical Exam  Gen: arousable, NAD, appropriately conversational  Cardiac: RRR to peripheral palpation  Resp: nonlabored on RA  GI: soft, nondistended    Left Lower Extremity:   - Skin intact  - Incisions are CDI  - Fires DF/PF/EHL  - SILT in saph/sural/SPN/DPN distributions  - Foot warm, well perfused  - Palpable DP pulse, brisk cap refill  - Compartments soft and compressible      Home Medications     Medication List      START taking these medications     acetaminophen 500 mg tablet; Commonly known as: Tylenol; Take 2 tablets   (1,000 mg) by mouth every 8 hours.; Replaces: acetaminophen 650 mg ER   tablet   aspirin 81 mg chewable tablet; Chew and swallow 1 tablet (81 mg) 2 times   a day for 3 doses. Start postoperative day 0 and continue for 3 doses;   Replaces: aspirin 81 mg tablet   cefadroxil 500 mg capsule; Commonly known as: Duricef; Take 1 capsule   (500 mg) by mouth 2 times a day for 7 days. **Please fill this medication   on the day of surgery with Dr. Barber.**   docusate sodium 100 mg capsule; Commonly known as: Colace; Take 1   capsule (100 mg) by mouth 2 times a day for 15 days.   ondansetron 4 mg tablet; Commonly known as: Zofran; Take 1 tablet (4 mg)   by mouth every 8 hours if needed for nausea or vomiting.   oxyCODONE 5 mg immediate release tablet; Commonly known as:  Roxicodone;   Take 1-2 tablets (5-10 mg) by mouth every 6 hours if needed for severe   pain (7 - 10) for up to 7 days.   pantoprazole 40 mg EC tablet; Commonly known as: ProtoNix; Take 1 tablet   (40 mg) by mouth once daily in the morning. Take before meals. Do not   crush, chew, or split.   polyethylene glycol 17 gram/dose powder; Commonly known as: Glycolax,   Miralax; Mix 17 g of powder and drink once daily.   traMADol 50 mg tablet; Commonly known as: Ultram; Take 1-2 tablets   ( mg) by mouth every 6 hours if needed for severe pain (7 - 10) for   up to 7 days.     CHANGE how you take these medications     apixaban 2.5 mg tablet; Commonly known as: Eliquis; Take 1 tablet (2.5   mg) by mouth 2 times a day. Start morning of postoperative day 2 after   finishing aspirin and continue for 7 days then resume home dose. This is   HALF your home dose.; What changed: medication strength, how much to take     CONTINUE taking these medications     amLODIPine 5 mg tablet; Commonly known as: Norvasc; Take 1 tablet (5 mg)   by mouth once daily.   desoximetasone 0.25 % cream; Commonly known as: Topicort   famotidine 20 mg tablet; Commonly known as: Pepcid   hydrocortisone 1 % ointment   levothyroxine 112 mcg tablet; Commonly known as: Synthroid, Levoxyl;   TAKE 1 TABLET (112 MCG) BY MOUTH ONCE DAILY IN THE MORNING. TAKE ON EMPTY   STOMACH   lovastatin 40 mg tablet; Commonly known as: Mevacor; Take 1 tablet (40   mg) by mouth once daily at bedtime.   MELATONIN ORAL   Nasacort 55 mcg nasal inhaler; Generic drug: triamcinolone   neomycin-polymyxin-pramoxine 3.5-10,000-10 mg-unit-mg/gram cream;   Commonly known as: Neosporin   pseudoephedrine 30 mg tablet; Commonly known as: Sudafed   Slow Fe 137 mg (45 mg iron) tablet extended release; Generic drug:   ferrous sulfate   tadalafil 10 mg tablet; Commonly known as: Cialis; Take 1 tablet (10 mg)   by mouth if needed for erectile dysfunction.   ZyrTEC 10 mg tablet; Generic drug:  cetirizine     STOP taking these medications     acetaminophen 650 mg ER tablet; Commonly known as: Tylenol 8 HOUR;   Replaced by: acetaminophen 500 mg tablet   aspirin 81 mg tablet; Replaced by: aspirin 81 mg chewable tablet   azelastine 137 mcg (0.1 %) nasal spray; Commonly known as: Astelin   chlorhexidine 0.12 % solution; Commonly known as: Peridex   meloxicam 15 mg tablet; Commonly known as: Mobic   omeprazole OTC 20 mg EC tablet; Commonly known as: PriLOSEC OTC       Outpatient Follow-Up  Future Appointments   Date Time Provider Department [place]   8/2/2025 To Be Determined Blaire Pink, PT [facility]   8/4/2025 To Be Determined Racheal Brandt, OT [facility]   8/6/2025 11:00 AM Nabor Grimaldo DO [id number] [place]   8/11/2025  2:30 PM Rosenda Ge, PT [id number] [place]   8/13/2025 11:30 AM Rosenda EVELYN Ge, PT [id number] [place]   8/18/2025 11:30 AM Rosenda EVELYN Ge, PT [id number] [place]   8/20/2025 11:30 AM Rosenda EVELYN Ge, PT [id number] [place]   8/25/2025 11:30 AM Rosenda EVELYN Ge, PT [id number] [place]   8/27/2025 11:30 AM Rosenda EVELYN Ge, PT [id number] [place]   9/2/2025 11:30 AM Rosenda EVELYN Ge, PT [id number] [place]   9/4/2025 10:45 AM Rosenda EVELYN Ge, PT [id number] [place]   9/10/2025  1:20 PM Jazzy Barber MD [id number] [place]   9/15/2025 10:00 AM Abilio Davila MD [id number] [place]   1/30/2026 10:50 AM Jazzy Barber MD [id number] [place]   7/31/2026 10:50 AM Jazzy Barber MD [id number] [facility]       Gideon Amaya MD

## 2025-08-02 ENCOUNTER — HOME CARE VISIT (OUTPATIENT)
Dept: HOME HEALTH SERVICES | Facility: HOME HEALTH | Age: 75
End: 2025-08-02
Payer: MEDICARE

## 2025-08-02 VITALS
HEART RATE: 80 BPM | TEMPERATURE: 98.5 F | SYSTOLIC BLOOD PRESSURE: 112 MMHG | DIASTOLIC BLOOD PRESSURE: 66 MMHG | RESPIRATION RATE: 16 BRPM | OXYGEN SATURATION: 94 %

## 2025-08-02 PROCEDURE — 169592 NO-PAY CLAIM PROCEDURE

## 2025-08-02 PROCEDURE — G0151 HHCP-SERV OF PT,EA 15 MIN: HCPCS | Mod: HHH

## 2025-08-02 ASSESSMENT — ACTIVITIES OF DAILY LIVING (ADL): OASIS_M1830: 05

## 2025-08-02 ASSESSMENT — ENCOUNTER SYMPTOMS
PAIN LOCATION - PAIN FREQUENCY: CONSTANT
PAIN LOCATION - PAIN SEVERITY: 2/10
LOWEST PAIN SEVERITY IN PAST 24 HOURS: 1/10
PAIN LOCATION - EXACERBATING FACTORS: MOVEMENT, MEDICATION WEARING OFF
OCCASIONAL FEELINGS OF UNSTEADINESS: 1
PAIN LOCATION: LEFT HIP
HIGHEST PAIN SEVERITY IN PAST 24 HOURS: 7/10
PERSON REPORTING PAIN: PATIENT
PAIN LOCATION - PAIN QUALITY: ACHING
PAIN LOCATION - RELIEVING FACTORS: MEDICATION, ICE, REST
SUBJECTIVE PAIN PROGRESSION: WAXING AND WANING
PAIN: 1

## 2025-08-03 SDOH — HEALTH STABILITY: PHYSICAL HEALTH
EXERCISE COMMENTS: PT INSTRUCTED PATIENT IN EXERCISES PER ANTERIOR THA PROTOCOL 1X15:  - ANKLE PUMPS  - GLUT SETS  - QUAD SETS  - HEELSLIDES  - HIP ABDUCTION  - SAQ  - LAQ    PT EDUCATED PATIENT TO PERFORM EXERCISES TWICE DAILY TO MAXIMIZE STRENGTH GAINS.

## 2025-08-03 ASSESSMENT — ACTIVITIES OF DAILY LIVING (ADL)
ENTERING_EXITING_HOME: CONTACT GUARD ASSIST
AMBULATION_DISTANCE/DURATION_TOLERATED: 50 FT.
AMBULATION ASSISTANCE ON FLAT SURFACES: 1

## 2025-08-03 ASSESSMENT — ENCOUNTER SYMPTOMS
ANGER WITHIN DEFINED LIMITS: 1
AGGRESSION WITHIN DEFINED LIMITS: 1
SLEEP QUALITY: FAIR

## 2025-08-04 ENCOUNTER — TELEPHONE (OUTPATIENT)
Dept: ORTHOPEDIC SURGERY | Facility: HOSPITAL | Age: 75
End: 2025-08-04
Payer: MEDICARE

## 2025-08-04 DIAGNOSIS — M16.12 PRIMARY OSTEOARTHRITIS OF LEFT HIP: Primary | ICD-10-CM

## 2025-08-04 RX ORDER — ACETAMINOPHEN 500 MG
1000 TABLET ORAL EVERY 6 HOURS PRN
Qty: 360 TABLET | Refills: 0 | Status: SHIPPED | OUTPATIENT
Start: 2025-08-04 | End: 2025-10-03

## 2025-08-04 NOTE — TELEPHONE ENCOUNTER
Spoke with patient during follow-up phone call.  Patient indicates that they are doing well and pain is under control.  Patient denies questions related to discharge instructions or homegoing medications.  Patient is aware of follow up visit with surgeon's office.  Home Care has established a first visit with patient.  He has not had a bowel movement since surgery.  He has been taking the prescribed Colace and Miralax (twice a day). He is taking Iron supplement as well. He stated he has Magnesium Citrate and will take that today. He is aware to call back if no results.   Patient denies addtional questions or concerns at this time and verbalizes understanding to call RN Navigator or Surgeon's office with any new or worsening symptoms.

## 2025-08-05 ENCOUNTER — HOME CARE VISIT (OUTPATIENT)
Dept: HOME HEALTH SERVICES | Facility: HOME HEALTH | Age: 75
End: 2025-08-05
Payer: MEDICARE

## 2025-08-05 VITALS
HEART RATE: 85 BPM | RESPIRATION RATE: 18 BRPM | OXYGEN SATURATION: 97 % | DIASTOLIC BLOOD PRESSURE: 80 MMHG | TEMPERATURE: 98.2 F | SYSTOLIC BLOOD PRESSURE: 122 MMHG

## 2025-08-05 PROCEDURE — G0152 HHCP-SERV OF OT,EA 15 MIN: HCPCS | Mod: HHH

## 2025-08-05 ASSESSMENT — PAIN SCALES - PAIN ASSESSMENT IN ADVANCED DEMENTIA (PAINAD)
CONSOLABILITY: 0 - NO NEED TO CONSOLE.
BODYLANGUAGE: 0 - RELAXED.
BODYLANGUAGE: 0
FACIALEXPRESSION: 0
FACIALEXPRESSION: 0 - SMILING OR INEXPRESSIVE.
NEGVOCALIZATION: 0 - NONE.
CONSOLABILITY: 0
NEGVOCALIZATION: 0
BREATHING: 0
TOTALSCORE: 0

## 2025-08-05 ASSESSMENT — ENCOUNTER SYMPTOMS
PAIN SEVERITY GOAL: 0/10
PERSON REPORTING PAIN: PATIENT
PAIN: 1
SUBJECTIVE PAIN PROGRESSION: GRADUALLY IMPROVING
LOWEST PAIN SEVERITY IN PAST 24 HOURS: 2/10
HIGHEST PAIN SEVERITY IN PAST 24 HOURS: 6/10

## 2025-08-05 ASSESSMENT — ACTIVITIES OF DAILY LIVING (ADL)
FEEDING_WITHIN_DEFINED_LIMITS: 1
BATHING_WITHIN_DEFINED_LIMITS: 1
GROOMING_WITHIN_DEFINED_LIMITS: 1
BATHING ASSESSED: 1
TOILETING: 1
DRESSING_LB_CURRENT_FUNCTION: INDEPENDENT
BATHING_CURRENT_FUNCTION: INDEPENDENT
TOILETING: INDEPENDENT

## 2025-08-06 ENCOUNTER — HOME CARE VISIT (OUTPATIENT)
Dept: HOME HEALTH SERVICES | Facility: HOME HEALTH | Age: 75
End: 2025-08-06
Payer: MEDICARE

## 2025-08-06 ENCOUNTER — APPOINTMENT (OUTPATIENT)
Dept: ORTHOPEDIC SURGERY | Facility: CLINIC | Age: 75
End: 2025-08-06
Payer: MEDICARE

## 2025-08-06 PROCEDURE — G0151 HHCP-SERV OF PT,EA 15 MIN: HCPCS | Mod: HHH | Performed by: PHYSICAL THERAPIST

## 2025-08-06 SDOH — HEALTH STABILITY: PHYSICAL HEALTH
EXERCISE COMMENTS: PATIENT SUPINE EXERCISES PERFORMED INCLUDING ANKLE PUMPS, QUAD SETS, GLUTEAL SETS, HEEL SLIDES, SAQ, HIP ABDUCTION. SEATED LAQ X 10-20 EACH

## 2025-08-06 ASSESSMENT — ENCOUNTER SYMPTOMS
PAIN LOCATION - PAIN SEVERITY: 5/10
PAIN LOCATION: LEFT HIP
PERSON REPORTING PAIN: PATIENT
MUSCLE WEAKNESS: 1
PAIN: 1

## 2025-08-08 ENCOUNTER — HOME CARE VISIT (OUTPATIENT)
Dept: HOME HEALTH SERVICES | Facility: HOME HEALTH | Age: 75
End: 2025-08-08
Payer: MEDICARE

## 2025-08-08 PROCEDURE — G0151 HHCP-SERV OF PT,EA 15 MIN: HCPCS | Mod: HHH | Performed by: PHYSICAL THERAPIST

## 2025-08-08 SDOH — HEALTH STABILITY: PHYSICAL HEALTH
EXERCISE COMMENTS: PATIENT COMPLETED THE FOLLOWING EXERCISES: SUPINE ANKLE PUMPS, QUAD SETS, GLUTEAL SETS, HEEL SLIDES, SAQ, AND HIP ABDUCTION X 15. STANDING: HEEL RAISES, SQUATS, MARCHES, HIP ABDUCTION, LEG CURLS X 10 EACH.

## 2025-08-08 ASSESSMENT — ENCOUNTER SYMPTOMS: MUSCLE WEAKNESS: 1

## 2025-08-11 ENCOUNTER — APPOINTMENT (OUTPATIENT)
Dept: ORTHOPEDIC SURGERY | Facility: HOSPITAL | Age: 75
End: 2025-08-11
Payer: MEDICARE

## 2025-08-11 ENCOUNTER — APPOINTMENT (OUTPATIENT)
Dept: PHYSICAL THERAPY | Facility: CLINIC | Age: 75
End: 2025-08-11
Payer: MEDICARE

## 2025-08-11 ENCOUNTER — HOME CARE VISIT (OUTPATIENT)
Dept: HOME HEALTH SERVICES | Facility: HOME HEALTH | Age: 75
End: 2025-08-11
Payer: MEDICARE

## 2025-08-11 DIAGNOSIS — R29.898 WEAKNESS OF LEFT HIP: ICD-10-CM

## 2025-08-11 DIAGNOSIS — M25.552 LEFT HIP PAIN: Primary | ICD-10-CM

## 2025-08-11 PROCEDURE — G0151 HHCP-SERV OF PT,EA 15 MIN: HCPCS | Mod: HHH | Performed by: PHYSICAL THERAPIST

## 2025-08-11 ASSESSMENT — ACTIVITIES OF DAILY LIVING (ADL)
AMBULATION ASSISTANCE: STAND BY ASSIST
CURRENT_FUNCTION: STAND BY ASSIST

## 2025-08-11 ASSESSMENT — ENCOUNTER SYMPTOMS
MUSCLE WEAKNESS: 1
PAIN: 1
HIGHEST PAIN SEVERITY IN PAST 24 HOURS: 6/10
PAIN LOCATION - PAIN SEVERITY: 4/10
PERSON REPORTING PAIN: PATIENT
PAIN LOCATION: LEFT HIP

## 2025-08-13 ENCOUNTER — APPOINTMENT (OUTPATIENT)
Dept: PHYSICAL THERAPY | Facility: CLINIC | Age: 75
End: 2025-08-13
Payer: MEDICARE

## 2025-08-13 DIAGNOSIS — M25.552 LEFT HIP PAIN: Primary | ICD-10-CM

## 2025-08-13 DIAGNOSIS — R29.898 WEAKNESS OF LEFT HIP: ICD-10-CM

## 2025-08-15 ENCOUNTER — HOME CARE VISIT (OUTPATIENT)
Dept: HOME HEALTH SERVICES | Facility: HOME HEALTH | Age: 75
End: 2025-08-15
Payer: MEDICARE

## 2025-08-15 PROCEDURE — G0151 HHCP-SERV OF PT,EA 15 MIN: HCPCS | Mod: HHH | Performed by: PHYSICAL THERAPIST

## 2025-08-15 ASSESSMENT — ENCOUNTER SYMPTOMS
PAIN LOCATION: RIGHT KNEE
MUSCLE WEAKNESS: 1
PAIN: 1
PAIN LOCATION: RIGHT HIP
PAIN LOCATION - PAIN SEVERITY: 6/10
PAIN LOCATION - PAIN SEVERITY: 2/10
PERSON REPORTING PAIN: PATIENT

## 2025-08-15 ASSESSMENT — ACTIVITIES OF DAILY LIVING (ADL)
AMBULATION ASSISTANCE ON FLAT SURFACES: 1
OASIS_M1830: 01
HOME_HEALTH_OASIS: 00

## 2025-08-16 DIAGNOSIS — E78.5 HYPERLIPIDEMIA, UNSPECIFIED HYPERLIPIDEMIA TYPE: ICD-10-CM

## 2025-08-18 ENCOUNTER — EVALUATION (OUTPATIENT)
Dept: PHYSICAL THERAPY | Facility: CLINIC | Age: 75
End: 2025-08-18
Payer: MEDICARE

## 2025-08-18 DIAGNOSIS — R26.2 DIFFICULTY WALKING: ICD-10-CM

## 2025-08-18 DIAGNOSIS — M25.552 LEFT HIP PAIN: Primary | ICD-10-CM

## 2025-08-18 DIAGNOSIS — M25.562 LEFT KNEE PAIN: ICD-10-CM

## 2025-08-18 PROCEDURE — 97161 PT EVAL LOW COMPLEX 20 MIN: CPT | Mod: GP

## 2025-08-18 PROCEDURE — 97110 THERAPEUTIC EXERCISES: CPT | Mod: GP

## 2025-08-18 RX ORDER — LOVASTATIN 40 MG/1
40 TABLET ORAL NIGHTLY
Qty: 90 TABLET | Refills: 1 | Status: SHIPPED | OUTPATIENT
Start: 2025-08-18

## 2025-08-20 ENCOUNTER — TREATMENT (OUTPATIENT)
Dept: PHYSICAL THERAPY | Facility: CLINIC | Age: 75
End: 2025-08-20
Payer: MEDICARE

## 2025-08-20 DIAGNOSIS — R29.898 WEAKNESS OF LEFT HIP: ICD-10-CM

## 2025-08-20 DIAGNOSIS — M25.552 LEFT HIP PAIN: Primary | ICD-10-CM

## 2025-08-20 DIAGNOSIS — M25.562 LEFT KNEE PAIN: ICD-10-CM

## 2025-08-20 DIAGNOSIS — R26.2 DIFFICULTY WALKING: ICD-10-CM

## 2025-08-20 PROCEDURE — 97140 MANUAL THERAPY 1/> REGIONS: CPT | Mod: GP

## 2025-08-20 PROCEDURE — 97110 THERAPEUTIC EXERCISES: CPT | Mod: GP

## 2025-08-25 ENCOUNTER — TREATMENT (OUTPATIENT)
Dept: PHYSICAL THERAPY | Facility: CLINIC | Age: 75
End: 2025-08-25
Payer: MEDICARE

## 2025-08-25 DIAGNOSIS — M25.552 LEFT HIP PAIN: Primary | ICD-10-CM

## 2025-08-25 DIAGNOSIS — M25.562 LEFT KNEE PAIN: ICD-10-CM

## 2025-08-25 DIAGNOSIS — R26.2 DIFFICULTY WALKING: ICD-10-CM

## 2025-08-25 DIAGNOSIS — R29.898 WEAKNESS OF LEFT HIP: ICD-10-CM

## 2025-08-25 PROCEDURE — 97140 MANUAL THERAPY 1/> REGIONS: CPT | Mod: GP

## 2025-08-25 PROCEDURE — 97110 THERAPEUTIC EXERCISES: CPT | Mod: GP

## 2025-08-27 ENCOUNTER — TREATMENT (OUTPATIENT)
Dept: PHYSICAL THERAPY | Facility: CLINIC | Age: 75
End: 2025-08-27
Payer: MEDICARE

## 2025-08-27 DIAGNOSIS — M25.562 LEFT KNEE PAIN: ICD-10-CM

## 2025-08-27 DIAGNOSIS — R29.898 WEAKNESS OF LEFT HIP: ICD-10-CM

## 2025-08-27 DIAGNOSIS — M25.552 LEFT HIP PAIN: Primary | ICD-10-CM

## 2025-08-27 DIAGNOSIS — R26.2 DIFFICULTY WALKING: ICD-10-CM

## 2025-08-27 PROCEDURE — 97140 MANUAL THERAPY 1/> REGIONS: CPT | Mod: GP

## 2025-08-27 PROCEDURE — 97110 THERAPEUTIC EXERCISES: CPT | Mod: GP

## 2025-09-04 ENCOUNTER — TREATMENT (OUTPATIENT)
Dept: PHYSICAL THERAPY | Facility: CLINIC | Age: 75
End: 2025-09-04
Payer: MEDICARE

## 2025-09-04 DIAGNOSIS — M25.562 LEFT KNEE PAIN: ICD-10-CM

## 2025-09-04 DIAGNOSIS — R29.898 WEAKNESS OF LEFT HIP: ICD-10-CM

## 2025-09-04 DIAGNOSIS — R26.2 DIFFICULTY WALKING: ICD-10-CM

## 2025-09-04 DIAGNOSIS — M25.552 LEFT HIP PAIN: Primary | ICD-10-CM

## 2025-09-04 PROCEDURE — 97110 THERAPEUTIC EXERCISES: CPT | Mod: GP

## 2025-09-04 PROCEDURE — 97140 MANUAL THERAPY 1/> REGIONS: CPT | Mod: GP

## 2025-09-17 ENCOUNTER — APPOINTMENT (OUTPATIENT)
Dept: RADIOLOGY | Facility: HOSPITAL | Age: 75
End: 2025-09-17
Payer: MEDICARE

## 2026-01-30 ENCOUNTER — APPOINTMENT (OUTPATIENT)
Dept: ORTHOPEDIC SURGERY | Facility: CLINIC | Age: 76
End: 2026-01-30
Payer: MEDICARE

## 2026-07-31 ENCOUNTER — APPOINTMENT (OUTPATIENT)
Dept: ORTHOPEDIC SURGERY | Facility: CLINIC | Age: 76
End: 2026-07-31
Payer: MEDICARE

## (undated) DEVICE — SYRINGE, TOOMEY, IRRIGATION, 60ML, INDIVIDUAL WRAP, STERILE

## (undated) DEVICE — Device

## (undated) DEVICE — APPLICATOR, CHLORAPREP, W/ORANGE TINT, 26ML

## (undated) DEVICE — SYRINGE, 50 CC, LUER LOCK

## (undated) DEVICE — WOUND SYSTEM, DEBRIDEMENT & CLEANING, O.R DUOPAK

## (undated) DEVICE — CATHETER, LASER URETERAL, 7.1FR, 40CM

## (undated) DEVICE — IRRIGATION SET, CYSTOSCOPY, TURP, Y, CONTINUOUS, 81 IN

## (undated) DEVICE — DRAPE, SHEET, THREE QUARTER, FAN FOLD, 57 X 77 IN

## (undated) DEVICE — TOWEL, SURGICAL, NEURO, O/R, 16 X 26, BLUE, STERILE

## (undated) DEVICE — GLOVE, PROTEXIS PI CLASSIC, SZ-7.0, PF, LF

## (undated) DEVICE — BLADE, ROTATION MORCELLATOR, 4.8MM X 335MM, PIRHANA, DISPOSABLE

## (undated) DEVICE — UROVAC BLADDER EVACUATOR

## (undated) DEVICE — COVER, C-ARM W/CLIPS, OEC GE

## (undated) DEVICE — DRAPE, INCISE, ANTIMICROBIAL, IOBAN 2, STERI DRAPE, 23 X 33 IN, DISPOSABLE, STERILE

## (undated) DEVICE — SYRINGE, 20 CC, LUER LOCK

## (undated) DEVICE — GLOVE, PROTEXIS PI CLASSIC, SZ-6.5, PF, LF

## (undated) DEVICE — GOWN, SURGICAL, SIRUS, NON REINFORCED, LARGE

## (undated) DEVICE — TUBING, MORCELLATOR PUMP, DISPOSABLE

## (undated) DEVICE — 24FR, 3-WAY, 30CC, BARDEX LUBRICATH HEMATURIA LATEX FOLEY CATHETER, COUDE TIP

## (undated) DEVICE — TUBING, ELLIK, FOR ELLIK/TOOMEY ADAPTERS

## (undated) DEVICE — GUIDEWIRE, DUAL SENSOR, .035 X 150 STRAIGHT,  3CM

## (undated) DEVICE — GLOVE, SURGICAL, PROTEXIS NEOPRENE, 7.0, PF, LF

## (undated) DEVICE — SUTURE, STRATAFIX, SYMMETRIC PDS PLUS, SIZE 1, 45CM, CT 40MM VIOLET

## (undated) DEVICE — INTERPULSE HANDPIECE SET W/ 10FT SUCTION TUBING

## (undated) DEVICE — SUTURE, ETHIBOND XTRA, 5 V-37, GRN/BR, LF

## (undated) DEVICE — COVER, MAYO STAND, W/PAD, 23 IN, DISPOSABLE, PLASTIC, LF, STERILE

## (undated) DEVICE — SUTURE, MONOCRYL, 3-0, 27 IN, PS-2, UNDYED

## (undated) DEVICE — CLOSURE SYSTEM, DERMABOND, PRINEO, 22CM, STERILE

## (undated) DEVICE — STAPLER, SKIN PROXIMATE, 35 WIDE

## (undated) DEVICE — SYRINGE, 60 CC, IRRIGATION, BULB, CONTRO-BULB, PAPER POUCH

## (undated) DEVICE — POSITION KIT, HANA PROFX SPINE

## (undated) DEVICE — DRAPE, ISOLATION, ANTIMICROBIAL, W/POUCH, IOBAN 2, STERI DRAPE, 125 X 83 IN, DISPOSABLE, STERILE

## (undated) DEVICE — SUTURE, VICRYL PLUS, 0, 8X18IN, CT-1, UND, BRAIDED

## (undated) DEVICE — BLADE, SAW PFC DUAL CUT 25 X 90

## (undated) DEVICE — HIGH FLOW TIP FOR INTERPULSE HANDPIECE SET

## (undated) DEVICE — BAG, DRAINAGE, CONTINUOUS IRRIGATION, 4L

## (undated) DEVICE — SUTURE, VICRYL PLUS 3-0, SH, 27IN

## (undated) DEVICE — DRESSING, MEPILEX BORDER, POST-OP AG, 4 X 6 IN

## (undated) DEVICE — ELECTRODE, ELECTROSURGICAL, BLADE, STANDARD, 2.75 IN

## (undated) DEVICE — MARKER, SKIN, DUAL TIP INK W/9 LABEL AND REMOVABLE TIME OUT SLEEVE